# Patient Record
Sex: MALE | Race: WHITE | NOT HISPANIC OR LATINO | Employment: OTHER | URBAN - METROPOLITAN AREA
[De-identification: names, ages, dates, MRNs, and addresses within clinical notes are randomized per-mention and may not be internally consistent; named-entity substitution may affect disease eponyms.]

---

## 2023-03-30 DIAGNOSIS — Z13.220 LIPID SCREENING: ICD-10-CM

## 2023-03-30 DIAGNOSIS — Z12.5 SCREENING FOR PROSTATE CANCER: ICD-10-CM

## 2023-03-30 DIAGNOSIS — G25.0 ESSENTIAL TREMOR: ICD-10-CM

## 2023-03-30 DIAGNOSIS — N40.1 BENIGN PROSTATIC HYPERPLASIA WITH LOWER URINARY TRACT SYMPTOMS, SYMPTOM DETAILS UNSPECIFIED: ICD-10-CM

## 2023-03-30 DIAGNOSIS — E78.5 HYPERLIPIDEMIA, UNSPECIFIED HYPERLIPIDEMIA TYPE: ICD-10-CM

## 2023-03-30 DIAGNOSIS — I10 HYPERTENSION, UNSPECIFIED TYPE: ICD-10-CM

## 2023-03-30 NOTE — TELEPHONE ENCOUNTER
Pt said he was given rx for Primidonne 25mg for his tremors and was told to call back if that does not help. Pt says the med is not helping or making any difference with tremors. Pt would like med increased to 50mg. Local pharm: [facility] [place].

## 2023-03-31 RX ORDER — PRIMIDONE 50 MG/1
50 TABLET ORAL NIGHTLY
Qty: 90 TABLET | Refills: 1 | Status: SHIPPED | OUTPATIENT
Start: 2023-03-31 | End: 2023-08-25

## 2023-03-31 RX ORDER — PRIMIDONE 50 MG/1
50 TABLET ORAL NIGHTLY
Qty: 90 TABLET | Refills: 1 | Status: SHIPPED | OUTPATIENT
Start: 2023-03-31 | End: 2023-03-31 | Stop reason: SDUPTHER

## 2023-03-31 RX ORDER — PRIMIDONE 50 MG/1
50 TABLET ORAL NIGHTLY
COMMUNITY
Start: 2023-02-03 | End: 2023-03-31 | Stop reason: SDUPTHER

## 2023-04-27 ENCOUNTER — LAB (OUTPATIENT)
Dept: LAB | Facility: LAB | Age: 83
End: 2023-04-27
Payer: MEDICARE

## 2023-04-27 DIAGNOSIS — Z12.5 SCREENING FOR PROSTATE CANCER: ICD-10-CM

## 2023-04-27 DIAGNOSIS — I10 HYPERTENSION, UNSPECIFIED TYPE: ICD-10-CM

## 2023-04-27 DIAGNOSIS — E78.5 HYPERLIPIDEMIA, UNSPECIFIED HYPERLIPIDEMIA TYPE: ICD-10-CM

## 2023-04-27 LAB
ALANINE AMINOTRANSFERASE (SGPT) (U/L) IN SER/PLAS: 14 U/L (ref 10–52)
ALBUMIN (G/DL) IN SER/PLAS: 3.9 G/DL (ref 3.4–5)
ALKALINE PHOSPHATASE (U/L) IN SER/PLAS: 42 U/L (ref 33–136)
ANION GAP IN SER/PLAS: 11 MMOL/L (ref 10–20)
ASPARTATE AMINOTRANSFERASE (SGOT) (U/L) IN SER/PLAS: 18 U/L (ref 9–39)
BILIRUBIN TOTAL (MG/DL) IN SER/PLAS: 1 MG/DL (ref 0–1.2)
CALCIUM (MG/DL) IN SER/PLAS: 9.1 MG/DL (ref 8.6–10.3)
CARBON DIOXIDE, TOTAL (MMOL/L) IN SER/PLAS: 31 MMOL/L (ref 21–32)
CHLORIDE (MMOL/L) IN SER/PLAS: 103 MMOL/L (ref 98–107)
CHOLESTEROL (MG/DL) IN SER/PLAS: 117 MG/DL (ref 0–199)
CHOLESTEROL IN HDL (MG/DL) IN SER/PLAS: 54.8 MG/DL
CHOLESTEROL/HDL RATIO: 2.1
CREATININE (MG/DL) IN SER/PLAS: 1.04 MG/DL (ref 0.5–1.3)
ERYTHROCYTE DISTRIBUTION WIDTH (RATIO) BY AUTOMATED COUNT: 12.9 % (ref 11.5–14.5)
ERYTHROCYTE MEAN CORPUSCULAR HEMOGLOBIN CONCENTRATION (G/DL) BY AUTOMATED: 31.4 G/DL (ref 32–36)
ERYTHROCYTE MEAN CORPUSCULAR VOLUME (FL) BY AUTOMATED COUNT: 97 FL (ref 80–100)
ERYTHROCYTES (10*6/UL) IN BLOOD BY AUTOMATED COUNT: 4.23 X10E12/L (ref 4.5–5.9)
GFR MALE: 72 ML/MIN/1.73M2
GLUCOSE (MG/DL) IN SER/PLAS: 96 MG/DL (ref 74–99)
HEMATOCRIT (%) IN BLOOD BY AUTOMATED COUNT: 41.1 % (ref 41–52)
HEMOGLOBIN (G/DL) IN BLOOD: 12.9 G/DL (ref 13.5–17.5)
LDL: 54 MG/DL (ref 0–99)
LEUKOCYTES (10*3/UL) IN BLOOD BY AUTOMATED COUNT: 5.5 X10E9/L (ref 4.4–11.3)
PLATELETS (10*3/UL) IN BLOOD AUTOMATED COUNT: 140 X10E9/L (ref 150–450)
POTASSIUM (MMOL/L) IN SER/PLAS: 4.4 MMOL/L (ref 3.5–5.3)
PROSTATE SPECIFIC ANTIGEN,SCREEN: 0.29 NG/ML (ref 0–4)
PROTEIN TOTAL: 6.3 G/DL (ref 6.4–8.2)
SODIUM (MMOL/L) IN SER/PLAS: 141 MMOL/L (ref 136–145)
TRIGLYCERIDE (MG/DL) IN SER/PLAS: 43 MG/DL (ref 0–149)
UREA NITROGEN (MG/DL) IN SER/PLAS: 17 MG/DL (ref 6–23)
VLDL: 9 MG/DL (ref 0–40)

## 2023-04-27 PROCEDURE — 85027 COMPLETE CBC AUTOMATED: CPT

## 2023-04-27 PROCEDURE — 80053 COMPREHEN METABOLIC PANEL: CPT

## 2023-04-27 PROCEDURE — 84153 ASSAY OF PSA TOTAL: CPT

## 2023-04-27 PROCEDURE — 80061 LIPID PANEL: CPT

## 2023-04-27 PROCEDURE — 36415 COLL VENOUS BLD VENIPUNCTURE: CPT

## 2023-04-28 ENCOUNTER — TELEPHONE (OUTPATIENT)
Dept: PRIMARY CARE | Facility: CLINIC | Age: 83
End: 2023-04-28
Payer: MEDICARE

## 2023-04-28 NOTE — TELEPHONE ENCOUNTER
----- Message from Mj Moreira MD sent at 4/28/2023  7:45 AM EDT -----  Inform patient of normal results of all recent labs.   Even though some of the lab values may fall outside of the normal range, I do not feel they are clinically concerning or relevant at this time.

## 2023-05-04 ENCOUNTER — OFFICE VISIT (OUTPATIENT)
Dept: PRIMARY CARE | Facility: CLINIC | Age: 83
End: 2023-05-04
Payer: MEDICARE

## 2023-05-04 VITALS
HEART RATE: 52 BPM | HEIGHT: 74 IN | BODY MASS INDEX: 33.11 KG/M2 | SYSTOLIC BLOOD PRESSURE: 130 MMHG | TEMPERATURE: 97.3 F | RESPIRATION RATE: 20 BRPM | WEIGHT: 258 LBS | DIASTOLIC BLOOD PRESSURE: 80 MMHG

## 2023-05-04 DIAGNOSIS — Z00.00 ROUTINE GENERAL MEDICAL EXAMINATION AT HEALTH CARE FACILITY: Primary | ICD-10-CM

## 2023-05-04 DIAGNOSIS — H91.91 HEARING LOSS OF RIGHT EAR, UNSPECIFIED HEARING LOSS TYPE: ICD-10-CM

## 2023-05-04 DIAGNOSIS — G25.2 INTENTION TREMOR: ICD-10-CM

## 2023-05-04 PROBLEM — K21.9 GASTROESOPHAGEAL REFLUX DISEASE WITHOUT ESOPHAGITIS: Status: ACTIVE | Noted: 2023-05-04

## 2023-05-04 PROBLEM — R09.81 NASAL CONGESTION: Status: RESOLVED | Noted: 2023-05-04 | Resolved: 2023-05-04

## 2023-05-04 PROBLEM — J44.9 CHRONIC OBSTRUCTIVE PULMONARY DISEASE (MULTI): Status: ACTIVE | Noted: 2023-05-04

## 2023-05-04 PROBLEM — H10.30 ACUTE CONJUNCTIVITIS: Status: RESOLVED | Noted: 2023-05-04 | Resolved: 2023-05-04

## 2023-05-04 PROBLEM — R06.09 EXERTIONAL DYSPNEA: Status: ACTIVE | Noted: 2023-05-04

## 2023-05-04 PROBLEM — R31.0 GROSS HEMATURIA: Status: ACTIVE | Noted: 2023-05-04

## 2023-05-04 PROBLEM — H02.889 MGD (MEIBOMIAN GLAND DYSFUNCTION): Status: ACTIVE | Noted: 2023-05-04

## 2023-05-04 PROBLEM — N64.4 MASTODYNIA: Status: ACTIVE | Noted: 2023-05-04

## 2023-05-04 PROBLEM — R35.0 FREQUENCY OF URINATION: Status: ACTIVE | Noted: 2023-05-04

## 2023-05-04 PROBLEM — N20.0 RECURRENT NEPHROLITHIASIS: Status: ACTIVE | Noted: 2023-05-04

## 2023-05-04 PROBLEM — R06.09 EXERTIONAL DYSPNEA: Status: RESOLVED | Noted: 2023-05-04 | Resolved: 2023-05-04

## 2023-05-04 PROBLEM — H25.13 NUCLEAR SCLEROSIS OF BOTH EYES: Status: ACTIVE | Noted: 2023-05-04

## 2023-05-04 PROBLEM — R93.1 ABNORMAL ECHOCARDIOGRAM: Status: ACTIVE | Noted: 2023-05-04

## 2023-05-04 PROBLEM — N40.0 BPH (BENIGN PROSTATIC HYPERPLASIA): Status: ACTIVE | Noted: 2023-05-04

## 2023-05-04 PROBLEM — N63.0 LUMP OR MASS IN BREAST: Status: ACTIVE | Noted: 2023-05-04

## 2023-05-04 PROBLEM — R05.3 CHRONIC COUGH: Status: RESOLVED | Noted: 2023-05-04 | Resolved: 2023-05-04

## 2023-05-04 PROBLEM — R31.9 HEMATURIA: Status: RESOLVED | Noted: 2023-05-04 | Resolved: 2023-05-04

## 2023-05-04 PROBLEM — J01.90 ACUTE SINUSITIS: Status: RESOLVED | Noted: 2023-05-04 | Resolved: 2023-05-04

## 2023-05-04 PROBLEM — I27.20 PULMONARY HYPERTENSION (MULTI): Status: ACTIVE | Noted: 2023-05-04

## 2023-05-04 PROBLEM — I25.2 HISTORY OF ST ELEVATION MYOCARDIAL INFARCTION (STEMI): Status: ACTIVE | Noted: 2023-05-04

## 2023-05-04 PROBLEM — E66.9 OBESITY: Status: ACTIVE | Noted: 2023-05-04

## 2023-05-04 PROBLEM — K91.86 RETAINED GALLSTONES FOLLOWING LAPAROSCOPIC CHOLECYSTECTOMY: Status: ACTIVE | Noted: 2023-05-04

## 2023-05-04 PROBLEM — R06.2 WHEEZE: Status: RESOLVED | Noted: 2023-05-04 | Resolved: 2023-05-04

## 2023-05-04 PROBLEM — N30.01 ACUTE CYSTITIS WITH HEMATURIA: Status: ACTIVE | Noted: 2023-05-04

## 2023-05-04 PROBLEM — F41.9 ANXIETY: Status: ACTIVE | Noted: 2023-05-04

## 2023-05-04 PROBLEM — R91.8 LUNG NODULES: Status: ACTIVE | Noted: 2023-05-04

## 2023-05-04 PROBLEM — G47.33 OBSTRUCTIVE SLEEP APNEA: Status: ACTIVE | Noted: 2023-05-04

## 2023-05-04 PROBLEM — I25.10 MILD CAD: Status: ACTIVE | Noted: 2023-05-04

## 2023-05-04 PROBLEM — H43.813 PVD (POSTERIOR VITREOUS DETACHMENT), BOTH EYES: Status: ACTIVE | Noted: 2023-05-04

## 2023-05-04 PROBLEM — R25.1 TREMOR: Status: ACTIVE | Noted: 2023-05-04

## 2023-05-04 PROBLEM — H66.001 ACUTE SUPPURATIVE OTITIS MEDIA OF RIGHT EAR WITHOUT SPONTANEOUS RUPTURE OF TYMPANIC MEMBRANE: Status: ACTIVE | Noted: 2023-05-04

## 2023-05-04 PROBLEM — K80.50 CHOLEDOCHOLITHIASIS: Status: ACTIVE | Noted: 2023-05-04

## 2023-05-04 PROBLEM — H02.053 TRICHIASIS OF RIGHT EYELID: Status: ACTIVE | Noted: 2023-05-04

## 2023-05-04 PROBLEM — R06.2 WHEEZE: Status: ACTIVE | Noted: 2023-05-04

## 2023-05-04 PROBLEM — N30.01 ACUTE CYSTITIS WITH HEMATURIA: Status: RESOLVED | Noted: 2023-05-04 | Resolved: 2023-05-04

## 2023-05-04 PROBLEM — I49.3 FREQUENT PVCS: Status: ACTIVE | Noted: 2023-05-04

## 2023-05-04 PROBLEM — R07.89 CHEST DISCOMFORT: Status: RESOLVED | Noted: 2023-05-04 | Resolved: 2023-05-04

## 2023-05-04 PROBLEM — I49.1 PAC (PREMATURE ATRIAL CONTRACTION): Status: ACTIVE | Noted: 2023-05-04

## 2023-05-04 PROBLEM — J01.90 ACUTE SINUSITIS: Status: ACTIVE | Noted: 2023-05-04

## 2023-05-04 PROBLEM — R35.0 FREQUENCY OF URINATION: Status: RESOLVED | Noted: 2023-05-04 | Resolved: 2023-05-04

## 2023-05-04 PROBLEM — E78.5 HYPERLIPIDEMIA: Status: ACTIVE | Noted: 2023-05-04

## 2023-05-04 PROBLEM — L82.0 SEBORRHEIC KERATOSIS, INFLAMED: Status: ACTIVE | Noted: 2023-05-04

## 2023-05-04 PROBLEM — J45.909 ASTHMA (HHS-HCC): Status: ACTIVE | Noted: 2023-05-04

## 2023-05-04 PROBLEM — R07.89 CHEST DISCOMFORT: Status: ACTIVE | Noted: 2023-05-04

## 2023-05-04 PROBLEM — R09.81 NASAL CONGESTION: Status: ACTIVE | Noted: 2023-05-04

## 2023-05-04 PROBLEM — I48.0 PAROXYSMAL ATRIAL FIBRILLATION (MULTI): Status: ACTIVE | Noted: 2023-05-04

## 2023-05-04 PROBLEM — J30.9 ALLERGIC RHINITIS: Status: ACTIVE | Noted: 2023-05-04

## 2023-05-04 PROBLEM — R31.0 GROSS HEMATURIA: Status: RESOLVED | Noted: 2023-05-04 | Resolved: 2023-05-04

## 2023-05-04 PROBLEM — I49.3 VENTRICULAR ECTOPY: Status: ACTIVE | Noted: 2023-05-04

## 2023-05-04 PROBLEM — R25.1 TREMOR: Status: RESOLVED | Noted: 2023-05-04 | Resolved: 2023-05-04

## 2023-05-04 PROBLEM — G25.0 ESSENTIAL TREMOR: Status: ACTIVE | Noted: 2023-05-04

## 2023-05-04 PROBLEM — C44.91 BASAL CELL CARCINOMA: Status: ACTIVE | Noted: 2023-05-04

## 2023-05-04 PROBLEM — I49.9 ARRHYTHMIA: Status: ACTIVE | Noted: 2023-05-04

## 2023-05-04 PROBLEM — H66.001 ACUTE SUPPURATIVE OTITIS MEDIA OF RIGHT EAR WITHOUT SPONTANEOUS RUPTURE OF TYMPANIC MEMBRANE: Status: RESOLVED | Noted: 2023-05-04 | Resolved: 2023-05-04

## 2023-05-04 PROBLEM — R05.3 CHRONIC COUGH: Status: ACTIVE | Noted: 2023-05-04

## 2023-05-04 PROBLEM — L57.0 ACTINIC KERATOSIS: Status: ACTIVE | Noted: 2023-05-04

## 2023-05-04 PROBLEM — R00.1 SINUS BRADYCARDIA: Status: ACTIVE | Noted: 2023-05-04

## 2023-05-04 PROBLEM — R23.8 VENOUS LAKE OF LIP: Status: ACTIVE | Noted: 2023-05-04

## 2023-05-04 PROBLEM — H10.30 ACUTE CONJUNCTIVITIS: Status: ACTIVE | Noted: 2023-05-04

## 2023-05-04 PROBLEM — N34.3 DYSURIA-FREQUENCY SYNDROME: Status: ACTIVE | Noted: 2023-05-04

## 2023-05-04 PROBLEM — I10 BENIGN ESSENTIAL HYPERTENSION: Status: ACTIVE | Noted: 2023-05-04

## 2023-05-04 PROBLEM — R31.9 HEMATURIA: Status: ACTIVE | Noted: 2023-05-04

## 2023-05-04 PROCEDURE — 1170F FXNL STATUS ASSESSED: CPT | Performed by: FAMILY MEDICINE

## 2023-05-04 PROCEDURE — 3075F SYST BP GE 130 - 139MM HG: CPT | Performed by: FAMILY MEDICINE

## 2023-05-04 PROCEDURE — 3079F DIAST BP 80-89 MM HG: CPT | Performed by: FAMILY MEDICINE

## 2023-05-04 PROCEDURE — G0439 PPPS, SUBSEQ VISIT: HCPCS | Performed by: FAMILY MEDICINE

## 2023-05-04 PROCEDURE — 99212 OFFICE O/P EST SF 10 MIN: CPT | Performed by: FAMILY MEDICINE

## 2023-05-04 PROCEDURE — 1036F TOBACCO NON-USER: CPT | Performed by: FAMILY MEDICINE

## 2023-05-04 PROCEDURE — 1160F RVW MEDS BY RX/DR IN RCRD: CPT | Performed by: FAMILY MEDICINE

## 2023-05-04 PROCEDURE — 1159F MED LIST DOCD IN RCRD: CPT | Performed by: FAMILY MEDICINE

## 2023-05-04 RX ORDER — ASPIRIN 81 MG/1
TABLET ORAL
COMMUNITY

## 2023-05-04 RX ORDER — VALSARTAN 160 MG/1
80 TABLET ORAL DAILY
COMMUNITY
End: 2023-05-08 | Stop reason: SDUPTHER

## 2023-05-04 RX ORDER — TERAZOSIN 5 MG/1
5 CAPSULE ORAL EVERY MORNING
COMMUNITY
End: 2023-05-08 | Stop reason: SDUPTHER

## 2023-05-04 RX ORDER — ZINC GLUCONATE 50 MG
TABLET ORAL
COMMUNITY

## 2023-05-04 RX ORDER — URSODIOL 300 MG/1
1 CAPSULE ORAL 2 TIMES DAILY
COMMUNITY
Start: 2015-04-06 | End: 2023-06-19

## 2023-05-04 RX ORDER — NAPROXEN SODIUM 220 MG/1
TABLET ORAL
COMMUNITY

## 2023-05-04 RX ORDER — GUAIFENESIN 1200 MG/1
1200 TABLET, EXTENDED RELEASE ORAL EVERY 12 HOURS PRN
COMMUNITY

## 2023-05-04 RX ORDER — SOTALOL HYDROCHLORIDE 80 MG/1
1 TABLET ORAL EVERY 12 HOURS
COMMUNITY
Start: 2019-08-17 | End: 2023-06-19

## 2023-05-04 RX ORDER — PANTOPRAZOLE SODIUM 40 MG/1
40 TABLET, DELAYED RELEASE ORAL
COMMUNITY
End: 2023-05-25

## 2023-05-04 RX ORDER — ATORVASTATIN CALCIUM 80 MG/1
80 TABLET, FILM COATED ORAL DAILY
COMMUNITY
End: 2023-05-08

## 2023-05-04 RX ORDER — CALCIUM CARBONATE 500(1250)
1 TABLET ORAL DAILY
COMMUNITY

## 2023-05-04 RX ORDER — DUTASTERIDE 0.5 MG/1
0.5 CAPSULE, LIQUID FILLED ORAL DAILY
COMMUNITY

## 2023-05-04 RX ORDER — ACETAMINOPHEN 500 MG
TABLET ORAL DAILY
COMMUNITY

## 2023-05-04 RX ORDER — LORATADINE 10 MG/1
10 TABLET ORAL DAILY
COMMUNITY

## 2023-05-04 RX ORDER — OMEPRAZOLE 40 MG/1
40 CAPSULE, DELAYED RELEASE ORAL DAILY
COMMUNITY
End: 2023-08-25

## 2023-05-04 RX ORDER — FLUTICASONE PROPIONATE 50 MCG
1 SPRAY, SUSPENSION (ML) NASAL AS NEEDED
COMMUNITY
End: 2023-12-07 | Stop reason: ALTCHOICE

## 2023-05-04 RX ORDER — PROPRANOLOL HYDROCHLORIDE 20 MG/1
1 TABLET ORAL 2 TIMES DAILY
COMMUNITY
Start: 2019-10-15 | End: 2023-05-08

## 2023-05-04 RX ORDER — TERAZOSIN 10 MG/1
10 CAPSULE ORAL NIGHTLY
COMMUNITY
Start: 2014-01-02 | End: 2023-05-08

## 2023-05-04 ASSESSMENT — ACTIVITIES OF DAILY LIVING (ADL)
GROCERY_SHOPPING: INDEPENDENT
DOING_HOUSEWORK: INDEPENDENT
DRESSING: INDEPENDENT
BATHING: INDEPENDENT
TAKING_MEDICATION: INDEPENDENT
MANAGING_FINANCES: INDEPENDENT

## 2023-05-04 ASSESSMENT — PATIENT HEALTH QUESTIONNAIRE - PHQ9
1. LITTLE INTEREST OR PLEASURE IN DOING THINGS: NOT AT ALL
SUM OF ALL RESPONSES TO PHQ9 QUESTIONS 1 AND 2: 0
2. FEELING DOWN, DEPRESSED OR HOPELESS: NOT AT ALL

## 2023-05-04 NOTE — PROGRESS NOTES
Juan Carlos Nguyen is a 83 y.o. male here today for MAW.  Patient is here for a periodic health exam. We reviewed previous labs and medical records and history. I reviewed preventative health testing and immunizations.    Colonoscopy 3/30/2023 [facility] - 3 year repeat.  He had recent labs and we reviewed them in detail.  They were all essentially normal and his PSA was very good.    HPI   Right ear hearing has worsened since OM 8 months ago.  He says that his hearing is muffled in his ear but he is not having any pain or pressure sensations.  He does take fluticasone nasal spray every day for seasonal allergies.    Essential tremor-tremor has improved significantly since adding Primidone at 50 mg at his last visit.  He is happy with the improvement and wants to continue the same.        Objective    Visit Vitals  /80   Pulse 52   Temp 36.3 °C (97.3 °F)   Resp 20     Body mass index is 33.13 kg/m².     Physical Exam  Vitals and nursing note reviewed.   Constitutional:       General: He is not in acute distress.     Appearance: Normal appearance.   HENT:      Head: Normocephalic and atraumatic.      Right Ear: Tympanic membrane, ear canal and external ear normal.      Left Ear: Tympanic membrane, ear canal and external ear normal.      Nose: Nose normal.      Mouth/Throat:      Mouth: Mucous membranes are moist.      Pharynx: Oropharynx is clear.   Eyes:      Extraocular Movements: Extraocular movements intact.      Conjunctiva/sclera: Conjunctivae normal.      Pupils: Pupils are equal, round, and reactive to light.   Cardiovascular:      Rate and Rhythm: Normal rate and regular rhythm.      Pulses: Normal pulses.      Heart sounds: Normal heart sounds. No murmur heard.     No friction rub. No gallop.   Pulmonary:      Effort: Pulmonary effort is normal. No respiratory distress.      Breath sounds: Normal breath sounds.   Abdominal:      General: Abdomen is flat. Bowel sounds are normal. There is no distension.       Palpations: Abdomen is soft.      Tenderness: There is no abdominal tenderness.   Musculoskeletal:         General: Normal range of motion.      Cervical back: Normal range of motion and neck supple.   Lymphadenopathy:      Cervical: No cervical adenopathy.   Skin:     General: Skin is warm and dry.      Findings: No lesion or rash.   Neurological:      General: No focal deficit present.      Mental Status: He is alert. Mental status is at baseline.   Psychiatric:         Mood and Affect: Mood normal.         Behavior: Behavior normal.         Thought Content: Thought content normal.         Judgment: Judgment normal.        Right ear-tympanic membrane is somewhat dull with evidence of fluid behind it but no significant erythema.  Canal is normal.    Assessment    1. Routine general medical examination at health care facility  1 Year Follow Up In [facility] - PCP - Wellness Exam   I recommend regular exercise, balanced diet, regular dental exams, and healthy habits.  Patient denies depression sxs.  Patient is able to perform all ADL's without assistance.  I reminded patient to get yearly eye exams with glaucoma screening.  He had a colonoscopy earlier this year as above.  His recent PSA was normal.  His immunizations are up-to-date.  His risk assessment was negative.  I recommend to eat plenty of plant foods (such as whole-grain products, fruits, and vegetables) and a moderate amount of lean and low-fat, animal-based food (meat and dairy products).  When shopping, choose lean meats, fish, and poultry. I recommend to increase aerobic exercise.  I recommend a yearly flu shot in the fall and I recommend a yearly wellness exam.     2. Hearing loss of right ear, unspecified hearing loss type  Referral to ENT   Patient has decreased hearing in his right ear since otitis media about 8 months ago.  He is already using Flonase so I am going to refer him to Dr. Chan for evaluation and treatment.      3. Intention  tremor     This has improved a lot since we added primidone at his last visit.  No change in current treatment regimen.

## 2023-05-06 DIAGNOSIS — I27.20 PULMONARY HYPERTENSION (MULTI): ICD-10-CM

## 2023-05-06 DIAGNOSIS — E78.5 HYPERLIPIDEMIA, UNSPECIFIED HYPERLIPIDEMIA TYPE: ICD-10-CM

## 2023-05-06 DIAGNOSIS — I49.9 CARDIAC ARRHYTHMIA, UNSPECIFIED CARDIAC ARRHYTHMIA TYPE: ICD-10-CM

## 2023-05-08 RX ORDER — ATORVASTATIN CALCIUM 80 MG/1
TABLET, FILM COATED ORAL
Qty: 90 TABLET | Refills: 1 | Status: SHIPPED | OUTPATIENT
Start: 2023-05-08 | End: 2023-11-03

## 2023-05-08 RX ORDER — TERAZOSIN 10 MG/1
CAPSULE ORAL
Qty: 90 CAPSULE | Refills: 1 | Status: SHIPPED | OUTPATIENT
Start: 2023-05-08 | End: 2023-06-02 | Stop reason: SDUPTHER

## 2023-05-08 RX ORDER — PROPRANOLOL HYDROCHLORIDE 20 MG/1
TABLET ORAL
Qty: 180 TABLET | Refills: 1 | Status: SHIPPED | OUTPATIENT
Start: 2023-05-08 | End: 2023-08-25 | Stop reason: SDUPTHER

## 2023-05-08 RX ORDER — VALSARTAN 80 MG/1
TABLET ORAL
Qty: 90 TABLET | Refills: 1 | Status: SHIPPED | OUTPATIENT
Start: 2023-05-08 | End: 2023-08-25 | Stop reason: SDUPTHER

## 2023-05-25 DIAGNOSIS — K21.9 GASTROESOPHAGEAL REFLUX DISEASE WITHOUT ESOPHAGITIS: ICD-10-CM

## 2023-05-25 DIAGNOSIS — I49.9 CARDIAC ARRHYTHMIA, UNSPECIFIED CARDIAC ARRHYTHMIA TYPE: ICD-10-CM

## 2023-05-25 RX ORDER — PANTOPRAZOLE SODIUM 40 MG/1
TABLET, DELAYED RELEASE ORAL
Qty: 90 TABLET | Refills: 0 | Status: SHIPPED | OUTPATIENT
Start: 2023-05-25 | End: 2023-08-25 | Stop reason: SDUPTHER

## 2023-05-25 RX ORDER — APIXABAN 5 MG/1
TABLET, FILM COATED ORAL
Qty: 180 TABLET | Refills: 0 | Status: SHIPPED | OUTPATIENT
Start: 2023-05-25 | End: 2023-09-08

## 2023-06-02 DIAGNOSIS — I27.20 PULMONARY HYPERTENSION (MULTI): ICD-10-CM

## 2023-06-02 RX ORDER — TERAZOSIN 5 MG/1
5 CAPSULE ORAL NIGHTLY
Qty: 90 CAPSULE | Refills: 0 | Status: SHIPPED | OUTPATIENT
Start: 2023-06-02 | End: 2023-08-25 | Stop reason: SDUPTHER

## 2023-06-02 NOTE — TELEPHONE ENCOUNTER
Pt calling for refill on   Terazosin 5mg daily #90    [facility] into [facility] in [place] on [address]           Patient states he's on 10mg and 5mg daily, so he just needs the 5mg ones called in.

## 2023-06-17 DIAGNOSIS — I48.0 PAROXYSMAL ATRIAL FIBRILLATION (MULTI): ICD-10-CM

## 2023-06-17 DIAGNOSIS — N34.3 DYSURIA-FREQUENCY SYNDROME: ICD-10-CM

## 2023-06-19 RX ORDER — URSODIOL 300 MG/1
CAPSULE ORAL
Qty: 180 CAPSULE | Refills: 1 | Status: SHIPPED | OUTPATIENT
Start: 2023-06-19 | End: 2023-08-25 | Stop reason: SDUPTHER

## 2023-06-19 RX ORDER — SOTALOL HYDROCHLORIDE 80 MG/1
TABLET ORAL
Qty: 180 TABLET | Refills: 1 | Status: SHIPPED | OUTPATIENT
Start: 2023-06-19 | End: 2023-08-25 | Stop reason: SDUPTHER

## 2023-08-16 ENCOUNTER — PATIENT OUTREACH (OUTPATIENT)
Dept: CARE COORDINATION | Facility: CLINIC | Age: 83
End: 2023-08-16
Payer: MEDICARE

## 2023-08-16 RX ORDER — ASPIRIN 81 MG/1
81 TABLET ORAL DAILY
COMMUNITY
End: 2023-08-25

## 2023-08-16 NOTE — PROGRESS NOTES
Discharge Facility: [facility]  Discharge Diagnosis: Stroke like symptoms  Admission Date: 8/14/2023  Discharge Date: 8/15/2023    PCP Appointment Date:  -8/25/2023 1140    Specialist Appointment Date:   -Dr. Antwan Sorto neurology; pt to call to schedule    Hospital Encounter and Summary: Linked     See discharge assessment below for further details    Engagement  Call Start Time: 1037 (8/16/2023 10:38 AM)    Medications  Medications reviewed with patient/caregiver?: Yes (new prescription reviewed; aspirin) (8/16/2023 10:38 AM)  Is the patient having any side effects they believe may be caused by any medication additions or changes?: No (8/16/2023 10:38 AM)  Does the patient have all medications ordered at discharge?: Yes (8/16/2023 10:38 AM)  Care Management Interventions: No intervention needed (8/16/2023 10:38 AM)  Is the patient taking all medications as directed (includes completed medication regime)?: Yes (8/16/2023 10:38 AM)  Medication Comments: Pt states he was told to stop taking primidone and continue aspirin and eliquis (8/16/2023 10:38 AM)    Appointments  Does the patient have a primary care provider?: Yes (8/16/2023 10:38 AM)  Care Management Interventions: Verified appointment date/time/provider (8/16/2023 10:38 AM)  Has the patient kept scheduled appointments due by today?: Yes (8/16/2023 10:38 AM)  Care Management Interventions: Advised to schedule with specialist (8/16/2023 10:38 AM)    Self Management  Has home health visited the patient within 72 hours of discharge?: Not applicable (8/16/2023 10:38 AM)    Patient Teaching  Does the patient have access to their discharge instructions?: Yes (8/16/2023 10:38 AM)  Care Management Interventions: Reviewed instructions with patient (8/16/2023 10:38 AM)  What is the patient's perception of their health status since discharge?: Improving (8/16/2023 10:38 AM)  Is the patient/caregiver able to teach back the hierarchy of who to call/visit for  symptoms/problems? PCP, Specialist, Home Health nurse, Urgent Care, ED, 911: Yes (8/16/2023 10:38 AM)    Wrap Up  Wrap Up Additional Comments: Pt admitted to [facility] 8/14-8/15 for left sided facial numbness and left arm numbness/weakness. Pt reports improvement in symptoms. Pt started on aspirin in addition to eliquis and reports he was instructed to discontinue primidone. Pt scheduled for PCP follow up 8/25 and states he will call to schedule neurology follow up. Pt denies any questions, needs, or concerns at this time. Pt encouraged to call if questions arise. (8/16/2023 10:38 AM)  Call End Time: 1047 (8/16/2023 10:38 AM)

## 2023-08-23 DIAGNOSIS — J45.909 ASTHMA, UNSPECIFIED ASTHMA SEVERITY, UNSPECIFIED WHETHER COMPLICATED, UNSPECIFIED WHETHER PERSISTENT (HHS-HCC): Primary | ICD-10-CM

## 2023-08-23 DIAGNOSIS — J44.9 CHRONIC OBSTRUCTIVE PULMONARY DISEASE, UNSPECIFIED COPD TYPE (MULTI): ICD-10-CM

## 2023-08-25 ENCOUNTER — OFFICE VISIT (OUTPATIENT)
Dept: PRIMARY CARE | Facility: CLINIC | Age: 83
End: 2023-08-25
Payer: MEDICARE

## 2023-08-25 VITALS
HEART RATE: 54 BPM | WEIGHT: 250 LBS | TEMPERATURE: 97.3 F | BODY MASS INDEX: 32.08 KG/M2 | DIASTOLIC BLOOD PRESSURE: 60 MMHG | HEIGHT: 74 IN | RESPIRATION RATE: 16 BRPM | SYSTOLIC BLOOD PRESSURE: 122 MMHG

## 2023-08-25 DIAGNOSIS — I49.9 CARDIAC ARRHYTHMIA, UNSPECIFIED CARDIAC ARRHYTHMIA TYPE: ICD-10-CM

## 2023-08-25 DIAGNOSIS — K21.9 GASTROESOPHAGEAL REFLUX DISEASE WITHOUT ESOPHAGITIS: ICD-10-CM

## 2023-08-25 DIAGNOSIS — I48.0 PAROXYSMAL ATRIAL FIBRILLATION (MULTI): ICD-10-CM

## 2023-08-25 DIAGNOSIS — N34.3 DYSURIA-FREQUENCY SYNDROME: ICD-10-CM

## 2023-08-25 DIAGNOSIS — I63.9 CEREBROVASCULAR ACCIDENT (CVA), UNSPECIFIED MECHANISM (MULTI): Primary | ICD-10-CM

## 2023-08-25 DIAGNOSIS — I27.20 PULMONARY HYPERTENSION (MULTI): ICD-10-CM

## 2023-08-25 PROBLEM — J44.9 COPD (CHRONIC OBSTRUCTIVE PULMONARY DISEASE) (MULTI): Status: ACTIVE | Noted: 2023-08-25

## 2023-08-25 PROBLEM — R10.9 ABDOMINAL PAIN: Status: ACTIVE | Noted: 2023-08-25

## 2023-08-25 PROBLEM — H90.3 BILATERAL SENSORINEURAL HEARING LOSS: Status: ACTIVE | Noted: 2023-08-25

## 2023-08-25 PROBLEM — J33.9 NASAL POLYPS: Status: ACTIVE | Noted: 2023-08-25

## 2023-08-25 PROBLEM — H52.4 BILATERAL PRESBYOPIA: Status: ACTIVE | Noted: 2023-08-25

## 2023-08-25 PROBLEM — R29.90 STROKE-LIKE SYMPTOMS: Status: ACTIVE | Noted: 2023-08-25

## 2023-08-25 PROCEDURE — 1160F RVW MEDS BY RX/DR IN RCRD: CPT | Performed by: FAMILY MEDICINE

## 2023-08-25 PROCEDURE — 1125F AMNT PAIN NOTED PAIN PRSNT: CPT | Performed by: FAMILY MEDICINE

## 2023-08-25 PROCEDURE — 3078F DIAST BP <80 MM HG: CPT | Performed by: FAMILY MEDICINE

## 2023-08-25 PROCEDURE — 1036F TOBACCO NON-USER: CPT | Performed by: FAMILY MEDICINE

## 2023-08-25 PROCEDURE — 99495 TRANSJ CARE MGMT MOD F2F 14D: CPT | Performed by: FAMILY MEDICINE

## 2023-08-25 PROCEDURE — 1159F MED LIST DOCD IN RCRD: CPT | Performed by: FAMILY MEDICINE

## 2023-08-25 PROCEDURE — 3074F SYST BP LT 130 MM HG: CPT | Performed by: FAMILY MEDICINE

## 2023-08-25 RX ORDER — PANTOPRAZOLE SODIUM 40 MG/1
TABLET, DELAYED RELEASE ORAL
Qty: 90 TABLET | Refills: 0 | OUTPATIENT
Start: 2023-08-25

## 2023-08-25 RX ORDER — SOTALOL HYDROCHLORIDE 80 MG/1
80 TABLET ORAL EVERY 12 HOURS
Qty: 180 TABLET | Refills: 1 | Status: SHIPPED | OUTPATIENT
Start: 2023-08-25 | End: 2024-02-19

## 2023-08-25 RX ORDER — PROPRANOLOL HYDROCHLORIDE 20 MG/1
20 TABLET ORAL 2 TIMES DAILY
Qty: 180 TABLET | Refills: 1 | Status: SHIPPED | OUTPATIENT
Start: 2023-08-25 | End: 2024-05-03

## 2023-08-25 RX ORDER — TERAZOSIN 10 MG/1
10 CAPSULE ORAL NIGHTLY
COMMUNITY
End: 2023-08-25 | Stop reason: SDUPTHER

## 2023-08-25 RX ORDER — PANTOPRAZOLE SODIUM 40 MG/1
40 TABLET, DELAYED RELEASE ORAL DAILY
Qty: 90 TABLET | Refills: 1 | Status: SHIPPED | OUTPATIENT
Start: 2023-08-25 | End: 2024-02-19

## 2023-08-25 RX ORDER — TERAZOSIN 5 MG/1
5 CAPSULE ORAL NIGHTLY
Qty: 90 CAPSULE | Refills: 1 | Status: SHIPPED | OUTPATIENT
Start: 2023-08-25 | End: 2024-03-08 | Stop reason: SDUPTHER

## 2023-08-25 RX ORDER — URSODIOL 300 MG/1
300 CAPSULE ORAL 2 TIMES DAILY
Qty: 180 CAPSULE | Refills: 1 | Status: SHIPPED | OUTPATIENT
Start: 2023-08-25 | End: 2024-02-19

## 2023-08-25 RX ORDER — TERAZOSIN 10 MG/1
10 CAPSULE ORAL NIGHTLY
Qty: 90 CAPSULE | Refills: 1 | Status: SHIPPED | OUTPATIENT
Start: 2023-08-25 | End: 2024-03-08

## 2023-08-25 RX ORDER — VALSARTAN 80 MG/1
80 TABLET ORAL DAILY
Qty: 90 TABLET | Refills: 1 | Status: SHIPPED | OUTPATIENT
Start: 2023-08-25 | End: 2024-05-03

## 2023-08-25 NOTE — PROGRESS NOTES
Juan Carlos Nguyen is a 83 y.o. male here today for   Chief Complaint   Patient presents with    Hospital Follow-up     Stroke         HPI     Recent right sided small stroke in [facility] from 8/14 to 8/15/23.  No change in meds because on DOAC already.  Was already on high dose atorvastatin.  On Eliquis.  On Baby ASA and Plavix.  They recommend to stop the Primidone b/c of conflict with Eliquis.  Has to make appt with Noreen in neuro for fu.  He has appt with EP in 2 weeks to recheck and discuss plan.  Now left arm and facial sxs completely resolve.  Face only lasted a few seconds.  Arm lasted a few hours.  He says he has called the neurologist office but they told him they cannot see him until January.  He completely denies any new neurological symptoms.  There is no numbness or tingling or weakness in his body.  I reviewed the results from the hospital stay including his echo and MRI.    Tremor is a little worse since he stopped the Primidone but not severe.        Current Outpatient Medications:     aspirin 81 mg EC tablet, , Disp: , Rfl:     atorvastatin (Lipitor) 80 mg tablet, TAKE ONE TABLET BY MOUTH EVERY DAY, Disp: 90 tablet, Rfl: 1    calcium 500 mg calcium (1,250 mg) tablet, Take 1 tablet (1,250 mg) by mouth once daily., Disp: , Rfl:     cholecalciferol (Vitamin D3) 5,000 Units tablet, Take by mouth once daily., Disp: , Rfl:     dutasteride (Avodart) 0.5 mg capsule, Take 1 capsule (0.5 mg) by mouth once daily., Disp: , Rfl:     Eliquis 5 mg tablet, TAKE ONE TABLET BY MOUTH TWO TIMES A DAY, Disp: 180 tablet, Rfl: 0    fluticasone (Flonase) 50 mcg/actuation nasal spray, Administer 1 spray into each nostril if needed for rhinitis. Shake gently. Before first use, prime pump. After use, clean tip and replace cap., Disp: , Rfl:     guaiFENesin (Mucinex) 1,200 mg tablet extended release 12hr, Take 1 tablet (1,200 mg) by mouth every 12 hours if needed., Disp: , Rfl:     loratadine (Claritin) 10 mg tablet, Take 1 tablet  (10 mg) by mouth once daily., Disp: , Rfl:     omega 3-dha-epa-fish oil (Fish OiL) 1,200 (144-216) mg capsule, Take by mouth., Disp: , Rfl:     zinc gluconate 50 mg tablet, Take by mouth., Disp: , Rfl:     pantoprazole (ProtoNix) 40 mg EC tablet, Take 1 tablet (40 mg) by mouth once daily. Do not crush, chew, or split., Disp: 90 tablet, Rfl: 1    propranolol (Inderal) 20 mg tablet, Take 1 tablet (20 mg) by mouth 2 times a day., Disp: 180 tablet, Rfl: 1    sotalol (Betapace) 80 mg tablet, Take 1 tablet (80 mg) by mouth every 12 hours., Disp: 180 tablet, Rfl: 1    terazosin (Hytrin) 10 mg capsule, Take 1 capsule (10 mg) by mouth once daily at bedtime., Disp: 90 capsule, Rfl: 1    terazosin (Hytrin) 5 mg capsule, Take 1 capsule (5 mg) by mouth once daily at bedtime., Disp: 90 capsule, Rfl: 1    ursodiol (Actigall) 300 mg capsule, Take 1 capsule (300 mg) by mouth 2 times a day., Disp: 180 capsule, Rfl: 1    valsartan (Diovan) 80 mg tablet, Take 1 tablet (80 mg) by mouth once daily. for blood pressure, Disp: 90 tablet, Rfl: 1    Patient Active Problem List   Diagnosis    Abnormal echocardiogram    Actinic keratosis    Allergic rhinitis    Anxiety    Arrhythmia    Ventricular ectopy    Asthma    Basal cell carcinoma    Benign essential hypertension    BPH (benign prostatic hyperplasia)    Choledocholithiasis    Chronic obstructive pulmonary disease (CMS/HCC)    Dysuria-frequency syndrome    Essential tremor    Mastodynia    Lung nodules    Lump or mass in breast    Hyperlipidemia    History of ST elevation myocardial infarction (STEMI)    Gastroesophageal reflux disease without esophagitis    Frequent PVCs    MGD (meibomian gland dysfunction)    Mild CAD    Nuclear sclerosis of both eyes    Obesity    Obstructive sleep apnea    Osteoarthritis of knee    PAC (premature atrial contraction)    Paroxysmal atrial fibrillation (CMS/HCC)    Pulmonary hypertension (CMS/HCC)    PVD (posterior vitreous detachment), both eyes     Recurrent nephrolithiasis    Retained gallstones following laparoscopic cholecystectomy    Seborrheic keratosis, inflamed    Sinus bradycardia    Intention tremor    Trichiasis of right eyelid    Venous lake of lip    Abdominal pain    COPD (chronic obstructive pulmonary disease) (CMS/HCC)    Acute ischemic stroke (CMS/HCC)    Bilateral presbyopia    Bilateral sensorineural hearing loss    Nasal polyps    Stroke-like symptoms         Recent Results (from the past 672 hour(s))   Electrocardiogram 12 Lead    Collection Time: 08/06/23  8:59 AM   Result Value Ref Range    Ventricular Rate 57 BPM    Atrial Rate 57 BPM    FL Interval 190 ms    QRS Duration 102 ms    QT Interval 436 ms    QTC Calculation(Bazett) 424 ms    P Axis 72 degrees    R Axis 44 degrees    T Axis 72 degrees    QRS Count 9 beats    Q Onset 221 ms    P Onset 126 ms    P Offset 180 ms    T Offset 439 ms    QTC Fredericia 428 ms   Electrocardiogram 12 Lead    Collection Time: 08/06/23  8:59 AM   Result Value Ref Range    Ventricular Rate 57 BPM    Atrial Rate 57 BPM    FL Interval 190 ms    QRS Duration 102 ms    QT Interval 436 ms    QTC Calculation(Bazett) 424 ms    P Axis 72 degrees    R Axis 44 degrees    T Axis 72 degrees    QRS Count 9 beats    Q Onset 221 ms    P Onset 126 ms    P Offset 180 ms    T Offset 439 ms    QTC Fredericia 428 ms   Comprehensive Metabolic Panel    Collection Time: 08/06/23  9:41 AM   Result Value Ref Range    Glucose 133 (H) 74 - 99 mg/dL    Sodium 136 136 - 145 mmol/L    Potassium 3.9 3.5 - 5.3 mmol/L    Chloride 105 98 - 107 mmol/L    Bicarbonate 23 21 - 32 mmol/L    Anion Gap 12 10 - 20 mmol/L    Urea Nitrogen 19 6 - 23 mg/dL    Creatinine 0.98 0.50 - 1.30 mg/dL    GFR MALE 77 >90 mL/min/1.73m2    Calcium 8.4 (L) 8.6 - 10.3 mg/dL    Albumin 3.5 3.4 - 5.0 g/dL    Alkaline Phosphatase 45 33 - 136 U/L    Total Protein 5.2 (L) 6.4 - 8.2 g/dL    AST 15 9 - 39 U/L    Total Bilirubin 1.3 (H) 0.0 - 1.2 mg/dL    ALT (SGPT) 12  10 - 52 U/L   Troponin I, High Sensitivity    Collection Time: 08/06/23  9:41 AM   Result Value Ref Range    Troponin I 13 0 - 20 ng/L   CBC and Auto Differential    Collection Time: 08/06/23  9:41 AM   Result Value Ref Range    WBC 12.3 (H) 4.4 - 11.3 x10E9/L    nRBC 0.0 0.0 - 0.0 /100 WBC    RBC 4.08 (L) 4.50 - 5.90 x10E12/L    Hemoglobin 12.3 (L) 13.5 - 17.5 g/dL    Hematocrit 39.8 (L) 41.0 - 52.0 %    MCV 98 80 - 100 fL    MCHC 30.9 (L) 32.0 - 36.0 g/dL    Platelets 117 (L) 150 - 450 x10E9/L    RDW 12.9 11.5 - 14.5 %    Neutrophils % 82.0 40.0 - 80.0 %    Immature Granulocytes %, Automated 0.7 0.0 - 0.9 %    Lymphocytes % 5.6 13.0 - 44.0 %    Monocytes % 11.4 2.0 - 10.0 %    Eosinophils % 0.1 0.0 - 6.0 %    Basophils % 0.2 0.0 - 2.0 %    Neutrophils Absolute 10.09 (H) 1.60 - 5.50 x10E9/L    Lymphocytes Absolute 0.69 (L) 0.80 - 3.00 x10E9/L    Monocytes Absolute 1.40 (H) 0.05 - 0.80 x10E9/L    Eosinophils Absolute 0.01 0.00 - 0.40 x10E9/L    Basophils Absolute 0.02 0.00 - 0.10 x10E9/L   Lipase    Collection Time: 08/06/23  9:41 AM   Result Value Ref Range    Lipase 16 9 - 82 U/L   Urinalysis with Reflex Microscopic and Culture    Collection Time: 08/06/23 10:17 AM   Result Value Ref Range    Color, Urine YELLOW STRAW,YELLOW    Appearance, Urine CLEAR CLEAR    Specific Gravity, Urine 1.021 1.005 - 1.035    pH, Urine 5.0 5.0 - 8.0    Protein, Urine NEGATIVE NEGATIVE mg/dL    Glucose, Urine NEGATIVE NEGATIVE mg/dL    Blood, Urine MODERATE(2+) (A) NEGATIVE    Ketones, Urine NEGATIVE NEGATIVE mg/dL    Bilirubin, Urine NEGATIVE NEGATIVE    Urobilinogen, Urine <2.0 0.0 - 1.9 mg/dL    Nitrite, Urine NEGATIVE NEGATIVE    Leukocyte Esterase, Urine NEGATIVE NEGATIVE   Urinalysis Microscopic Only    Collection Time: 08/06/23 10:17 AM   Result Value Ref Range    WBC, Urine 5-20 (A) 0 - 5 /HPF    RBC, Urine  (A) 0 - 5 /HPF   Urine Culture    Collection Time: 08/06/23 10:17 AM    Specimen: Urine   Result Value Ref Range     Urine Culture NO SIGNIFICANT GROWTH.    Comprehensive Metabolic Panel    Collection Time: 08/14/23 12:52 PM   Result Value Ref Range    Glucose 96 74 - 99 mg/dL    Sodium 140 136 - 145 mmol/L    Potassium 3.9 3.5 - 5.3 mmol/L    Chloride 105 98 - 107 mmol/L    Bicarbonate 27 21 - 32 mmol/L    Anion Gap 12 10 - 20 mmol/L    Urea Nitrogen 19 6 - 23 mg/dL    Creatinine 1.15 0.50 - 1.30 mg/dL    GFR MALE 64 >90 mL/min/1.73m2    Calcium 9.3 8.6 - 10.3 mg/dL    Albumin 3.9 3.4 - 5.0 g/dL    Alkaline Phosphatase 51 33 - 136 U/L    Total Protein 6.5 6.4 - 8.2 g/dL    AST 15 9 - 39 U/L    Total Bilirubin 0.6 0.0 - 1.2 mg/dL    ALT (SGPT) 14 10 - 52 U/L   CBC and Auto Differential    Collection Time: 08/14/23 12:52 PM   Result Value Ref Range    WBC 7.6 4.4 - 11.3 x10E9/L    nRBC 0.0 0.0 - 0.0 /100 WBC    RBC 4.04 (L) 4.50 - 5.90 x10E12/L    Hemoglobin 12.2 (L) 13.5 - 17.5 g/dL    Hematocrit 37.7 (L) 41.0 - 52.0 %    MCV 93 80 - 100 fL    MCHC 32.4 32.0 - 36.0 g/dL    Platelets 187 150 - 450 x10E9/L    RDW 12.7 11.5 - 14.5 %    Neutrophils % 75.4 40.0 - 80.0 %    Immature Granulocytes %, Automated 0.7 0.0 - 0.9 %    Lymphocytes % 14.7 13.0 - 44.0 %    Monocytes % 6.3 2.0 - 10.0 %    Eosinophils % 2.5 0.0 - 6.0 %    Basophils % 0.4 0.0 - 2.0 %    Neutrophils Absolute 5.75 (H) 1.60 - 5.50 x10E9/L    Lymphocytes Absolute 1.12 0.80 - 3.00 x10E9/L    Monocytes Absolute 0.48 0.05 - 0.80 x10E9/L    Eosinophils Absolute 0.19 0.00 - 0.40 x10E9/L    Basophils Absolute 0.03 0.00 - 0.10 x10E9/L   Troponin I, High Sensitivity    Collection Time: 08/14/23 12:52 PM   Result Value Ref Range    Troponin I 9 0 - 20 ng/L   Protime-INR    Collection Time: 08/14/23 12:52 PM   Result Value Ref Range    Protime 15.2 (H) 9.8 - 12.8 sec    INR 1.3 (H) 0.9 - 1.1   Magnesium    Collection Time: 08/14/23 12:52 PM   Result Value Ref Range    Magnesium 1.82 1.60 - 2.40 mg/dL   Lipid Panel    Collection Time: 08/14/23 12:52 PM   Result Value Ref Range     Cholesterol 101 0 - 199 mg/dL    HDL 37.5 (A) mg/dL    Cholesterol/HDL Ratio 2.7     LDL 53 0 - 99 mg/dL    VLDL 11 0 - 40 mg/dL    Triglycerides 55 0 - 149 mg/dL   Hemoglobin A1C    Collection Time: 08/14/23 12:52 PM   Result Value Ref Range    Hemoglobin A1C 5.8 (A) %    Estimated Average Glucose 120 MG/DL   Electrocardiogram 12 Lead    Collection Time: 08/14/23  1:08 PM   Result Value Ref Range    Ventricular Rate 56 BPM    Atrial Rate 56 BPM    IN Interval 202 ms    QRS Duration 108 ms    QT Interval 482 ms    QTC Calculation(Bazett) 465 ms    P Axis 87 degrees    R Axis 74 degrees    T Axis 80 degrees    QRS Count 10 beats    Q Onset 201 ms    P Onset 100 ms    P Offset 161 ms    T Offset 442 ms    QTC Fredericia 471 ms   Magnesium    Collection Time: 08/15/23  7:36 AM   Result Value Ref Range    Magnesium 1.87 1.60 - 2.40 mg/dL   Renal Function Panel    Collection Time: 08/15/23  7:36 AM   Result Value Ref Range    Glucose 88 74 - 99 mg/dL    Sodium 140 136 - 145 mmol/L    Potassium 4.2 3.5 - 5.3 mmol/L    Chloride 106 98 - 107 mmol/L    Bicarbonate 27 21 - 32 mmol/L    Anion Gap 11 10 - 20 mmol/L    Urea Nitrogen 17 6 - 23 mg/dL    Creatinine 0.89 0.50 - 1.30 mg/dL    GFR MALE 86 >90 mL/min/1.73m2    Calcium 8.8 8.6 - 10.3 mg/dL    Phosphorus 3.2 2.5 - 4.9 mg/dL    Albumin 3.6 3.4 - 5.0 g/dL   CBC and Auto Differential    Collection Time: 08/15/23  7:36 AM   Result Value Ref Range    WBC 5.5 4.4 - 11.3 x10E9/L    nRBC 0.0 0.0 - 0.0 /100 WBC    RBC 4.12 (L) 4.50 - 5.90 x10E12/L    Hemoglobin 12.4 (L) 13.5 - 17.5 g/dL    Hematocrit 39.0 (L) 41.0 - 52.0 %    MCV 95 80 - 100 fL    MCHC 31.8 (L) 32.0 - 36.0 g/dL    Platelets 164 150 - 450 x10E9/L    RDW 12.6 11.5 - 14.5 %    Neutrophils % 63.9 40.0 - 80.0 %    Immature Granulocytes %, Automated 0.7 0.0 - 0.9 %    Lymphocytes % 20.8 13.0 - 44.0 %    Monocytes % 9.9 2.0 - 10.0 %    Eosinophils % 4.2 0.0 - 6.0 %    Basophils % 0.5 0.0 - 2.0 %    Neutrophils  "Absolute 3.50 1.60 - 5.50 x10E9/L    Lymphocytes Absolute 1.14 0.80 - 3.00 x10E9/L    Monocytes Absolute 0.54 0.05 - 0.80 x10E9/L    Eosinophils Absolute 0.23 0.00 - 0.40 x10E9/L    Basophils Absolute 0.03 0.00 - 0.10 x10E9/L   Urinalysis with Reflex Microscopic    Collection Time: 08/16/23  4:14 AM   Result Value Ref Range    Color, Urine CANCELED     Appearance, Urine CANCELED     Specific Gravity, Urine CANCELED     pH, Urine CANCELED     Protein, Urine CANCELED     Glucose, Urine CANCELED     Blood, Urine CANCELED     Ketones, Urine CANCELED     Bilirubin, Urine CANCELED     Urobilinogen, Urine CANCELED     Nitrite, Urine CANCELED     Leukocyte Esterase, Urine CANCELED     Ascorbic Acid CANCELED mg/dL        Objective    Visit Vitals  /60   Pulse 54   Temp 36.3 °C (97.3 °F)   Resp 16   Ht 1.88 m (6' 2\")   Wt 113 kg (250 lb)   BMI 32.10 kg/m²     Body mass index is 32.1 kg/m².     Physical Exam   General - Not in acute distress and cooperative.  Build & Nutrition - Well developed  Posture - Normal  Gait - Normal  Mental Status - alert and oriented x 3    Head - Normocephalic    Neck - Thyroid normal size    Eyes - Bilateral - Sclera clear and lids pink without edema or mass.      Skin - Warm and dry with no rashes on visible skin    Lungs - Clear to auscultation and normal breathing effort    Cardiovascular - RRR and no murmurs, rubs or thrill.    Peripheral Vascular - Bilateral - no edema present    Neuropsychiatric - normal mood and affect        Assessment    1. Cerebrovascular accident (CVA), unspecified mechanism (CMS/HCC)     Patient has fully recovered from recent mild right-sided stroke.  He had very transient left face and left arm weakness but these resolved quickly as above.  He has no residual symptoms.  He is already on Plavix and baby aspirin and Eliquis.  He is on maximum dose statin.  We will call neurology to try to get him an appointment more quickly.  I recommend that he follow-up " with his electrophysiology team to discuss whether it was a cardiac origin for this recent stroke and if he needs a procedure on his POORNIMA.     2. Pulmonary hypertension (CMS/HCC)  valsartan (Diovan) 80 mg tablet, terazosin (Hytrin) 10 mg capsule, terazosin (Hytrin) 5 mg capsule   This has been stable.  No change in current treatment regimen.  Refill given of current medication.   3. Dysuria-frequency syndrome  ursodiol (Actigall) 300 mg capsule   No change in current treatment regimen.   4. Paroxysmal atrial fibrillation (CMS/HCC)  sotalol (Betapace) 80 mg tablet   Refill given of current medication.  This has been stable and he will follow-up with EP in the near future.   5. Gastroesophageal reflux disease without esophagitis  pantoprazole (ProtoNix) 40 mg EC tablet   No change in current treatment regimen.  Refill given of current medication.   6. Cardiac arrhythmia, unspecified cardiac arrhythmia type  propranolol (Inderal) 20 mg tablet   No change in current treatment regimen.  Refill given of current medication.

## 2023-08-28 ENCOUNTER — PATIENT OUTREACH (OUTPATIENT)
Dept: CARE COORDINATION | Facility: CLINIC | Age: 83
End: 2023-08-28
Payer: MEDICARE

## 2023-08-28 NOTE — PROGRESS NOTES
Call regarding appt. with PCP on 8/25/2023 after hospitalization.  At time of outreach call the patient feels as if their condition has improved since last visit.  Reviewed the PCP appointment with the pt and addressed any questions or concerns.  Pt states he has cardiologist follow up appt today and neurology follow up 9/7.  Pt denies any questions, needs, or concerns at this time. Pt encouraged to call if questions arise.

## 2023-09-05 LAB — URINE CULTURE: NORMAL

## 2023-09-08 DIAGNOSIS — I49.9 CARDIAC ARRHYTHMIA, UNSPECIFIED CARDIAC ARRHYTHMIA TYPE: ICD-10-CM

## 2023-09-08 LAB
ANION GAP IN SER/PLAS: 12 MMOL/L (ref 10–20)
CALCIUM (MG/DL) IN SER/PLAS: 9 MG/DL (ref 8.6–10.3)
CARBON DIOXIDE, TOTAL (MMOL/L) IN SER/PLAS: 30 MMOL/L (ref 21–32)
CHLORIDE (MMOL/L) IN SER/PLAS: 105 MMOL/L (ref 98–107)
CREATININE (MG/DL) IN SER/PLAS: 1.02 MG/DL (ref 0.5–1.3)
ERYTHROCYTE DISTRIBUTION WIDTH (RATIO) BY AUTOMATED COUNT: 13.2 % (ref 11.5–14.5)
ERYTHROCYTE MEAN CORPUSCULAR HEMOGLOBIN CONCENTRATION (G/DL) BY AUTOMATED: 32 G/DL (ref 32–36)
ERYTHROCYTE MEAN CORPUSCULAR VOLUME (FL) BY AUTOMATED COUNT: 95 FL (ref 80–100)
ERYTHROCYTES (10*6/UL) IN BLOOD BY AUTOMATED COUNT: 4.12 X10E12/L (ref 4.5–5.9)
GFR MALE: 74 ML/MIN/1.73M2
GLUCOSE (MG/DL) IN SER/PLAS: 106 MG/DL (ref 74–99)
HEMATOCRIT (%) IN BLOOD BY AUTOMATED COUNT: 39.1 % (ref 41–52)
HEMOGLOBIN (G/DL) IN BLOOD: 12.5 G/DL (ref 13.5–17.5)
INR IN PPP BY COAGULATION ASSAY: 1.3 (ref 0.9–1.1)
LEUKOCYTES (10*3/UL) IN BLOOD BY AUTOMATED COUNT: 5.3 X10E9/L (ref 4.4–11.3)
PLATELETS (10*3/UL) IN BLOOD AUTOMATED COUNT: 117 X10E9/L (ref 150–450)
POTASSIUM (MMOL/L) IN SER/PLAS: 4.2 MMOL/L (ref 3.5–5.3)
PROTHROMBIN TIME (PT) IN PPP BY COAGULATION ASSAY: 14.7 SEC (ref 9.8–12.8)
SODIUM (MMOL/L) IN SER/PLAS: 143 MMOL/L (ref 136–145)
UREA NITROGEN (MG/DL) IN SER/PLAS: 17 MG/DL (ref 6–23)

## 2023-09-08 RX ORDER — APIXABAN 5 MG/1
TABLET, FILM COATED ORAL
Qty: 180 TABLET | Refills: 3 | Status: SHIPPED | OUTPATIENT
Start: 2023-09-08

## 2023-09-13 ENCOUNTER — HOSPITAL ENCOUNTER (OUTPATIENT)
Dept: DATA CONVERSION | Facility: HOSPITAL | Age: 83
End: 2023-09-13
Attending: INTERNAL MEDICINE | Admitting: INTERNAL MEDICINE
Payer: MEDICARE

## 2023-09-13 DIAGNOSIS — Z95.818 PRESENCE OF OTHER CARDIAC IMPLANTS AND GRAFTS: ICD-10-CM

## 2023-09-13 DIAGNOSIS — I48.91 UNSPECIFIED ATRIAL FIBRILLATION (MULTI): ICD-10-CM

## 2023-09-13 PROBLEM — M19.91 LOCALIZED, PRIMARY OSTEOARTHRITIS: Status: ACTIVE | Noted: 2023-08-10

## 2023-09-13 PROBLEM — G45.9 TIA (TRANSIENT ISCHEMIC ATTACK): Status: ACTIVE | Noted: 2023-09-13

## 2023-09-13 PROBLEM — Z79.01 ANTICOAGULANT LONG-TERM USE: Status: ACTIVE | Noted: 2023-09-13

## 2023-09-13 PROBLEM — I63.9 ACUTE ISCHEMIC STROKE (MULTI): Status: RESOLVED | Noted: 2023-08-25 | Resolved: 2023-09-13

## 2023-09-13 PROBLEM — E66.811 CLASS 1 OBESITY: Status: ACTIVE | Noted: 2023-05-04

## 2023-09-13 PROBLEM — R10.9 ABDOMINAL PAIN: Status: RESOLVED | Noted: 2023-08-25 | Resolved: 2023-09-13

## 2023-09-13 RX ORDER — CEPHALEXIN 500 MG/1
CAPSULE ORAL
COMMUNITY
Start: 2023-09-03 | End: 2023-10-25 | Stop reason: ALTCHOICE

## 2023-09-13 RX ORDER — ENOXAPARIN SODIUM 100 MG/ML
INJECTION SUBCUTANEOUS
COMMUNITY
Start: 2023-08-28 | End: 2023-10-25 | Stop reason: ALTCHOICE

## 2023-09-13 RX ORDER — ALBUTEROL SULFATE 90 UG/1
1 AEROSOL, METERED RESPIRATORY (INHALATION) EVERY 4 HOURS PRN
COMMUNITY

## 2023-09-13 RX ORDER — ALBUTEROL SULFATE 0.83 MG/ML
2.5 SOLUTION RESPIRATORY (INHALATION) EVERY 6 HOURS PRN
COMMUNITY

## 2023-09-15 LAB
ATRIAL RATE: 49 BPM
P AXIS: 73 DEGREES
P OFFSET: 180 MS
P ONSET: 116 MS
PR INTERVAL: 208 MS
Q ONSET: 220 MS
QRS COUNT: 8 BEATS
QRS DURATION: 108 MS
QT INTERVAL: 480 MS
QTC CALCULATION(BAZETT): 433 MS
QTC FREDERICIA: 448 MS
R AXIS: 16 DEGREES
T AXIS: 17 DEGREES
T OFFSET: 460 MS
VENTRICULAR RATE: 49 BPM

## 2023-09-28 ENCOUNTER — PATIENT OUTREACH (OUTPATIENT)
Dept: CARE COORDINATION | Facility: CLINIC | Age: 83
End: 2023-09-28
Payer: MEDICARE

## 2023-09-28 NOTE — PROGRESS NOTES
Call placed regarding one month post discharge follow up call.  At time of outreach call the patient feels as if their condition has improved since initial visit with PCP or specialist.  Pt reports he is waiting to have a planned surgery for nasal polyps that are bothering him. Pt denies any other issues at this time.  Questions or concerns regarding recovery period addressed at this time.  Reviewed any PCP or specialists progress notes/labs/radiology reports if applicable and addressed any questions or concerns.  Verified next PCP appt 11/7/2023 1820.    Pt denies any questions, needs, or concerns at this time. Pt encouraged to call if questions arise.

## 2023-09-29 VITALS — BODY MASS INDEX: 32 KG/M2 | WEIGHT: 249.34 LBS | HEIGHT: 74 IN

## 2023-09-30 NOTE — H&P
History & Physical Reviewed:   I have reviewed the History and Physical dated:  28-Aug-2023   History and Physical reviewed and relevant findings noted. Patient examined to review pertinent physical  findings.: Significant findings noted below   Findings: episode of fast heart beat a couple weeks  ago, lasting days   Home Medications Reviewed: changes noted  see MAR   Allergies Reviewed: no changes noted       Airway/Sedation Assessment:  ·  Emotional Status calm   ·  Neurologic alert & oriented x 3   ·  Respiratory clear to auscultation   ·  Cardiovascular rhythm & rate regular   ·  GI/ soft, nontender     · Pulses present: Pedal Left, Pedal Right, Radial Left, Radial Right     ·  Mouth Opening OK yes   ·  Neck Flexibility OK yes   ·  Loose Teeth no   ·  Oropharyngeal Classification Class II   ·  ASA PS Classification ASA III   ·  Sedation Plan light sedation     Consent:   COVID-19 Consent:  ·  COVID-19 Risk Consent Surgeon has reviewed key risks related to the risk of carlos COVID-19 and if they contract COVID-19 what the risks are.       Electronic Signatures:  Laura Hopkins)  (Signed 13-Sep-2023 20:27)   Authored: History & Physical Reviewed, Airway/Sedation,  Consent, Note Completion      Last Updated: 13-Sep-2023 20:27 by Laura Hopkins)

## 2023-10-09 ENCOUNTER — TELEPHONE (OUTPATIENT)
Dept: OTOLARYNGOLOGY | Facility: CLINIC | Age: 83
End: 2023-10-09
Payer: MEDICARE

## 2023-10-09 DIAGNOSIS — J32.9 CHRONIC SINUSITIS, UNSPECIFIED LOCATION: Primary | ICD-10-CM

## 2023-10-09 RX ORDER — PREDNISONE 20 MG/1
TABLET ORAL
Qty: 9 TABLET | Refills: 0 | Status: SHIPPED | OUTPATIENT
Start: 2023-10-09 | End: 2023-11-07 | Stop reason: ALTCHOICE

## 2023-10-09 NOTE — TELEPHONE ENCOUNTER
Patient is being scheduled for FESS surgery at [facility] on 10/31/23.   Can you please send his 7 day taper of prednisone to [facility] in [place].

## 2023-10-18 ENCOUNTER — OFFICE VISIT (OUTPATIENT)
Dept: PULMONOLOGY | Facility: CLINIC | Age: 83
End: 2023-10-18
Payer: MEDICARE

## 2023-10-18 VITALS
HEIGHT: 74 IN | HEART RATE: 58 BPM | TEMPERATURE: 97.6 F | OXYGEN SATURATION: 96 % | DIASTOLIC BLOOD PRESSURE: 66 MMHG | RESPIRATION RATE: 16 BRPM | BODY MASS INDEX: 32.29 KG/M2 | WEIGHT: 251.6 LBS | SYSTOLIC BLOOD PRESSURE: 103 MMHG

## 2023-10-18 DIAGNOSIS — J45.20 MILD INTERMITTENT ASTHMA WITHOUT COMPLICATION (HHS-HCC): ICD-10-CM

## 2023-10-18 DIAGNOSIS — J44.9 CHRONIC OBSTRUCTIVE PULMONARY DISEASE, UNSPECIFIED COPD TYPE (MULTI): Primary | ICD-10-CM

## 2023-10-18 PROCEDURE — 1160F RVW MEDS BY RX/DR IN RCRD: CPT

## 2023-10-18 PROCEDURE — 3074F SYST BP LT 130 MM HG: CPT

## 2023-10-18 PROCEDURE — 99213 OFFICE O/P EST LOW 20 MIN: CPT

## 2023-10-18 PROCEDURE — 1125F AMNT PAIN NOTED PAIN PRSNT: CPT

## 2023-10-18 PROCEDURE — 1036F TOBACCO NON-USER: CPT

## 2023-10-18 PROCEDURE — 3078F DIAST BP <80 MM HG: CPT

## 2023-10-18 PROCEDURE — 1159F MED LIST DOCD IN RCRD: CPT

## 2023-10-18 RX ORDER — BUDESONIDE AND FORMOTEROL FUMARATE DIHYDRATE 80; 4.5 UG/1; UG/1
2 AEROSOL RESPIRATORY (INHALATION)
COMMUNITY
End: 2023-11-01

## 2023-10-18 NOTE — PROGRESS NOTES
Patient: Juan Carlos Nguyen    [id number]  : 1940 -- AGE 83 y.o.    Provider: DANIELE [facility]     Location [facility]   Service Date: 10/18/2023              [facility]  Follow Up Visit Note      HISTORY OF PRESENT ILLNESS       HISTORY OF PRESENT ILLNESS   Mr. Nguyen is a 83 y.o male, former cigar smoker, being evaluated for follow up visit for Asthma/COPD overlap.  .     I have independently interviewed and examined the patient in the office and reviewed available records.    Current History    On today's visit, the patient reports he uses symbicort daily, rarely uses his albuterol. He reports he swims 3 days a week, rides his bike with no issues nut notices when he walks for a distance he feels SOB. He has not tried his albuterol inhaler to see if he improves. He wears his CPAP nightly. He states he has developed nasal polyps that have gotten worse, he has to breath out of his mouth, he is having surgery to remove the polpys 10/31/23. He is up to date with vaccines.     Previous pulmonary history: COPD/ Asthma    Inhalers/nebulized medications: symbicort, albuterol    Hospitalization History: He has not been hospitalized over the last year for breathing related problem.    Sleep history: CPAP compliant      ALLERGIES AND MEDICATIONS     ALLERGIES  Allergies   Allergen Reactions    Montelukast Other    Finasteride Itching    Iodinated Contrast Media Other    Sulfamethoxazole-Trimethoprim Rash and Other     FACIAL REDNESS AND SWEATING       MEDICATIONS  Current Outpatient Medications   Medication Sig Dispense Refill    albuterol 2.5 mg /3 mL (0.083 %) nebulizer solution Take 3 mL (2.5 mg) by nebulization every 6 hours if needed for wheezing.      albuterol 90 mcg/actuation inhaler Inhale 1 puff every 4 hours if needed for wheezing.      apixaban (Eliquis) 5 mg tablet TAKE ONE TABLET BY MOUTH TWO TIMES A  tablet 3    aspirin 81 mg EC tablet        atorvastatin (Lipitor) 80 mg tablet TAKE ONE TABLET BY MOUTH EVERY DAY 90 tablet 1    calcium 500 mg calcium (1,250 mg) tablet Take 1 tablet (1,250 mg) by mouth once daily.      cephalexin (Keflex) 500 mg capsule TAKE ONE CAPSULE BY MOUTH TWO TIMES A DAY FOR 7 DAYS      cholecalciferol (Vitamin D3) 5,000 Units tablet Take by mouth once daily.      dutasteride (Avodart) 0.5 mg capsule Take 1 capsule (0.5 mg) by mouth once daily.      enoxaparin (Lovenox) 100 mg/mL syringe hold Eliquis 3 days before procedure. give injection twice daily two days before procedure. One in am day before procedure , none day of procedure. twice daily injection day after procedure. Resume Eliquis.      fluticasone (Flonase) 50 mcg/actuation nasal spray Administer 1 spray into each nostril if needed for rhinitis. Shake gently. Before first use, prime pump. After use, clean tip and replace cap.      guaiFENesin (Mucinex) 1,200 mg tablet extended release 12hr Take 1 tablet (1,200 mg) by mouth every 12 hours if needed.      loratadine (Claritin) 10 mg tablet Take 1 tablet (10 mg) by mouth once daily.      omega 3-dha-epa-fish oil (Fish OiL) 1,200 (144-216) mg capsule Take by mouth.      pantoprazole (ProtoNix) 40 mg EC tablet Take 1 tablet (40 mg) by mouth once daily. Do not crush, chew, or split. 90 tablet 1    predniSONE (Deltasone) 20 mg tablet 2 tabs daily x3 days then 1 tablet daily for 2 days then 1/2 tablet for 2 days 9 tablet 0    propranolol (Inderal) 20 mg tablet Take 1 tablet (20 mg) by mouth 2 times a day. 180 tablet 1    sotalol (Betapace) 80 mg tablet Take 1 tablet (80 mg) by mouth every 12 hours. 180 tablet 1    terazosin (Hytrin) 10 mg capsule Take 1 capsule (10 mg) by mouth once daily at bedtime. 90 capsule 1    terazosin (Hytrin) 5 mg capsule Take 1 capsule (5 mg) by mouth once daily at bedtime. 90 capsule 1    ursodiol (Actigall) 300 mg capsule Take 1 capsule (300 mg) by mouth 2 times a day. 180 capsule 1    valsartan  (Diovan) 80 mg tablet Take 1 tablet (80 mg) by mouth once daily. for blood pressure 90 tablet 1    zinc gluconate 50 mg tablet Take by mouth.       No current facility-administered medications for this visit.         PAST HISTORY     PAST MEDICAL HISTORY  Asthma  COPD  GERD  CAD  DLD  Asthma  Allergic rhinits  Nasal polyps    PAST SURGICAL HISTORY  Past Surgical History:   Procedure Laterality Date    CHOLECYSTECTOMY  10/14/2014    Cholecystectomy    MR HEAD ANGIO WO IV CONTRAST  8/14/2023    MR HEAD ANGIO WO IV CONTRAST 8/14/2023 [facility]    MR NECK ANGIO WO IV CONTRAST  8/14/2023    MR NECK ANGIO WO IV CONTRAST 8/14/2023 [facility]    OTHER SURGICAL HISTORY  04/30/2014    Left Breast Biopsy During Breast Surgery    OTHER SURGICAL HISTORY  04/11/2022    Loop recorder insertion    OTHER SURGICAL HISTORY  10/11/2021    Loop recorder insertion    OTHER SURGICAL HISTORY  10/11/2021    Tonsillectomy    OTHER SURGICAL HISTORY  10/11/2021    Cardioversion    OTHER SURGICAL HISTORY  06/07/2017    Anesthesia For Cystoscopy    OTHER SURGICAL HISTORY  04/11/2022    Colonoscopy    OTHER SURGICAL HISTORY  04/11/2022    Endoscopy    TONSILLECTOMY  06/07/2017    Tonsillectomy       IMMUNIZATION HISTORY  Immunization History   Administered Date(s) Administered    Flu vaccine (IIV4), preservative free *Check age/dose* 12/10/2013    Influenza, Unspecified 12/01/2011, 11/16/2015, 02/20/2016, 12/20/2016, 10/12/2017, 10/12/2018, 11/11/2019, 10/08/2020, 10/23/2020, 09/27/2021, 09/28/2022    Influenza, seasonal, injectable 11/01/2022    Influenza, seasonal, injectable, preservative free 11/17/2012, 10/18/2014    Pfizer Gray Cap SARS-CoV-2 05/13/2022    Pfizer Purple Cap SARS-CoV-2 01/26/2021, 02/16/2021, 09/27/2021, 05/01/2022    Pneumococcal conjugate vaccine, 13-valent (PREVNAR 13) 01/15/2016    Pneumococcal polysaccharide vaccine, 23-valent, age 2 years and older (PNEUMOVAX 23) 04/12/2021, 10/01/2022    Td vaccine, age 7 years and older  (TENIVAC) 06/15/2010    Tdap vaccine, age 7 year and older (BOOSTRIX) 04/21/2022    Zoster vaccine, recombinant, adult (SHINGRIX) 07/09/2022, 09/28/2022    Zoster, live 02/21/2013       SOCIAL HISTORY  smoking: former cigar smoker, quit years ago   illicit drug use: none   drinking: socially  Pets: no    OCCUPATIONAL/ENVIRONMENTAL HISTORY  Retired. 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 Exposed to asbestos one day working there.   No known exposure to asbestos, silica, beryllium or inhaled metals.  No exposure to birds or exotic animals.    FAMILY HISTORY  Family History   Problem Relation Name Age of Onset    Diabetes Mother      Cancer Mother      Breast cancer Mother      Diabetes Father      Cancer Father      Prostate cancer Father      Cancer Sister      Breast cancer Sister      Other (oral cancer) Brother      Cancer Daughter      Breast cancer Daughter      Other (breast cancer in female) Other       No family history of pulmonary disease.  No family history of cancer.  No family history of autoimmune disorders.    REVIEW OF SYSTEMS     REVIEW OF SYSTEMS  Review of Systems    Constitutional: No fever, no chills, no night sweats.    Eyes: No double vision, no floaters, no dry eyes.   ENT: See HPI.   Neck: No neck stiffness.  Cardiovascular: No sharp chest pain, no heart racing, no leg swelling.  Respiratory: as noted in HPI.   Gastrointestinal: No nausea, no vomiting, no diarrhea.   Musculoskeletal: No joint pain, no back pain.   Integumentary: No rashes or sores.  Neurological: No dizziness, no headaches. Sleeping well.  Psychiatric: No mood changes.   Endocrine: No hot flashes, no cold intolerance, weight is stable.  Hematologic: No easy bruising or bleeding.    PHYSICAL EXAM     VITAL SIGNS:   Vitals:    10/18/23 1015   BP: 103/66   Pulse: 58   Resp: 16   Temp: 36.4 °C (97.6 °F)   SpO2: 96%         CURRENT WEIGHT:Body mass index is 32.3 kg/m².    PREVIOUS WEIGHTS:  Wt Readings from  Last 3 Encounters:   08/25/23 113 kg (250 lb)   05/04/23 117 kg (258 lb)   04/18/23 115 kg (254 lb)       Physical Exam    Constitutional: General appearance: Alert and oriented.  No acute distress. Well developed, well nourished.  Head and face: Symmetric  ENT: external inspection of ear and nose normal. No intranasal polyps. No oropharyngeal exudates.    Oropharynx: normal   Neck: supple, no lymphadenopathy  Pulmonary: Chest is normal. No increased work of breathing or signs of respiratory distress. Clear to auscultation bilaterally - no crackles, wheezing, or rhonchi.   Cardiovascular: Heart rate and rhythm normal. Normal S1, S2 - no murmurs, gallops, or pericardial rub.   Abdomen: Soft, non tender, +BS  Extremities: No edema. No clubbing or cyanosis of the fingernails.    Neurologic: Moves all four extremities   MSK: Normal movements of extremities. Gait normal   Psychiatric: Intact judgement and insight.    RESULTS/DATA     Pulmonary Function Test Results     Pulmonary Functions Testing Results:  -1/2022: FEV1/FVC 0.55/ FEV1 1.51 (46% +BD response )/ FVC 2.76 (62%)/ TLC 5.58 (72%) %/ DLCO 80% -mod obstruction, significant BD response. Mild restrictive disease.      6MWT 3/2021: 299m () 95%-->93% RA ANASTASIIA 5    FENO  6/2022: 63  6/2021: 52  3/2021: 71        Chest Radiograph     XR chest 2 view 09/13/2023    Narrative  Interpreted By:  PRASHANTH GORDON MD  STUDY:  Chest Radiographs;  9/13/2023, 1:29PM.    INDICATION:  Pre op.    COMPARISON:  CXR 4/2/2021.    ACCESSION NUMBER(S):  [id number]    ORDERING CLINICIAN:  CYNTHIA VÁZQUEZ MD    TECHNIQUE:  Frontal and lateral chest.    FINDINGS:    CARDIOMEDIASTINAL SILHOUETTE:  Cardiomediastinal silhouette is normal in size and configuration.    LUNGS:  Calcified granuloma right lung apex. No focal consolidation. No  pleural effusion. No pneumothorax.    ABDOMEN:  No remarkable upper abdominal findings.    BONES:  No acute osseous changes.    Impression  No  acute cardiopulmonary abnormality.      Signed by Juan Durbin MD      Chest CT Scan     CT Chest 1/2021: Calcified nodule seen suggestive of old healed granulomatous disease. There is unchanged upper lobe fibrosis and architectural distortion as well as features suggesting chronic bronchitis or smoking related airway disease. No overt bullous disease detected. Enlarged main pulmonary artery suggestive of pulmonary arterial hypertension      Echocardiogram     Echocardiogram     [place]ative  [facility] [place]  [address], [place] [address]  Tel [phone] Fax [phone]    TRANSTHORACIC ECHOCARDIOGRAM REPORT      Patient Name:     DENISE JOHAN Sr Physician:  [id number] Sheela Varela MD  Study Date:       8/15/2023          Referring           SAKSHI PECK  Physician:  MRN/PID:          [id number]           PCP:  Accession/Order#: [id number]          Department          [facility] Echo Lab  Location:  YOB: 1940          Fellow:  Gender:           M                  Nurse:              Pedro Eubanks RN  Admit Date:       8/14/2023          Sonographer:        Ana Lutz [facility]  Admission Status: Inpatient -        Additional Staff:  Priority discharge  Height:           187.00 cm          CC Report to:  Weight:           116.00 kg          Study Type:         Echocardiogram  BSA:              2.40 m2  Blood Pressure: 122 /66 mmHg    Diagnosis/ICD: I63.9-Cerebral Infarction, unspecified  Indication:    Stroke  Procedure/CPT: Echo Complete w Full [id number]    Patient History:  Pertinent History: HTN, COPD, A-Fib and Palpitations. Previous echocardiogram  03-.    Study Detail: The following Echo studies were performed: 2D, M-Mode, Doppler and  color flow. Agitated saline used as a contrast agent for  intraseptal flow evaluation.      PHYSICIAN INTERPRETATION:  Left Ventricle: Left ventricular systolic function is normal, with an estimated ejection fraction of 60-65%. There  are no regional wall motion abnormalities. The left ventricular cavity size is normal. There is mild concentric left ventricular hypertrophy. Spectral Doppler shows a pseudonormal pattern of left ventricular diastolic filling. There is no definite left ventricular thrombus visualized. The intraventricular septum appears intact without evidence of shunting or a ventricular septal defect.  Left Atrium: The left atrium is moderately dilated. There is no atrial septal defect present.  Right Ventricle: The right ventricle is upper limits of normal in size. There is low normal right ventricular systolic function.  Right Atrium: The right atrium is mildly dilated.  Aortic Valve: The aortic valve is trileaflet. There is mild aortic valve thickening. There is no evidence of aortic valve stenosis.  There is trivial aortic valve regurgitation. The peak instantaneous gradient of the aortic valve is 9.1 mmHg.  Mitral Valve: The mitral valve is mild to moderately thickened. There is no evidence of mitral valve prolapse. There is no evidence of mitral valve stenosis. The doppler estimated mean and peak diastolic pressure gradients are 1.0 mmHg and 5.9 mmHg respectively. There is mild mitral annular calcification. There is mild to moderate mitral valve regurgitation which is centrally directed.  Tricuspid Valve: The tricuspid valve is structurally normal. There is no evidence of tricuspid valve stenosis. There is trace to mild tricuspid regurgitation. The Doppler estimated RVSP is mildly elevated at 40.2 mmHg.  Pulmonic Valve: The pulmonic valve was not assessed. There is trace pulmonic valve regurgitation.  Pericardium: There is no pericardial effusion noted.  Aorta: The aortic root is abnormal. There is mild dilatation of the ascending aorta. The aortic root is at the upper limits of normal size.  In comparison to the previous echocardiogram(s): Prior examinations are available and were reviewed for comparison purposes. Compared  with echo 3/2021 LVEF is unchanged. Mitral and tricuspid valve regurgitation have worsened. There is now bi-atrial enlargement.      CONCLUSIONS:  1. Left ventricular systolic function is normal with a 60-65% estimated ejection fraction.  2. Intact intraventricular septum without shunting or a ventricular septal defect.  3. No left ventricular thrombus visualized.  4. Spectral Doppler shows a pseudonormal pattern of left ventricular diastolic filling.  5. There is low normal right ventricular systolic function.  6. The left atrium is moderately dilated.  7. Mild to moderate mitral valve regurgitation.  8. No evidence of mitral valve prolapse.  9. There is No tricuspid stenosis.  10. Mildly elevated RVSP.  11. Aortic valve stenosis is not present.  12. Compared with echo 3/2021 LVEF is unchanged. Mitral and tricuspid valve regurgitation have worsened. There is now bi-atrial enlargement.    QUANTITATIVE DATA SUMMARY:  2D MEASUREMENTS:  Normal Ranges:  LAs:           5.50 cm    (2.7-4.0cm)  IVSd:          1.30 cm    (0.6-1.1cm)  LVPWd:         1.28 cm    (0.6-1.1cm)  LVIDd:         5.29 cm    (3.9-5.9cm)  LVIDs:         3.40 cm  LV Mass Index: 117.7 g/m2  LV % FS        35.7 %    LA VOLUME:  Normal Ranges:  LA Vol A4C:        73.6 ml    (22+/-6mL/m2)  LA Vol A2C:        103.8 ml  LA Vol BP:         88.0 ml  LA Vol Index A4C:  30.6ml/m2  LA Vol Index A2C:  43.2 ml/m2  LA Vol Index BP:   36.6 ml/m2  LA Area A4C:       23.9 cm2  LA Area A2C:       28.6 cm2  LA Major Axis A4C: 6.6 cm  LA Major Axis A2C: 6.7 cm  LA Volume Index:   42.1 ml/m2  LA Vol A4C:        71.0 ml  LA Vol A2C:        101.0 ml    M-MODE MEASUREMENTS:  Normal Ranges:  Ao Root: 3.50 cm (2.0-3.7cm)    AORTA MEASUREMENTS:  Normal Ranges:  Ao Sinus, d: 3.40 cm (2.1-3.5cm)  Ao STJ, d:   3.70 cm (1.7-3.4cm)  Asc Ao, d:   4.20 cm (2.1-3.4cm)    LV SYSTOLIC FUNCTION BY 2D PLANIMETRY (MOD):  Normal Ranges:  EF-A4C View: 63.3 % (>=55%)  EF-A2C View: 61.7  %  EF-Biplane:  63.4 %    LV DIASTOLIC FUNCTION:  Normal Ranges:  MV Peak E:        1.04 m/s    (0.7-1.2 m/s)  MV Peak A:        0.21 m/s    (0.42-0.7 m/s)  E/A Ratio:        5.02        (1.0-2.2)  MV lateral e'     0.06 m/s  MV medial e'      0.04 m/s  E/e' Ratio:       17.50       (<8.0)  PulmV Sys Toro:    23.40 cm/s  PulmV Delvalle Toro:   59.80 cm/s  PulmV A Revs Toro: 22.10 cm/s  PulmV A Revs Dur: 111.00 msec    MITRAL VALVE:  Normal Ranges:  MV Vmax:    1.21 m/s (<=1.3m/s)  MV peak P.9 mmHg (<5mmHg)  MV mean P.0 mmHg (<48mmHg)  MV DT:      172 msec (150-240msec)    AORTIC VALVE:  Normal Ranges:  AoV Vmax:      1.51 m/s (<=1.7m/s)  AoV Peak P.1 mmHg (<20mmHg)  LVOT Max Toro:  0.96 m/s (<=1.1m/s)  LVOT VTI:      20.50 cm  LVOT Diameter: 2.00 cm  (1.8-2.4cm)  AoV Area,Vmax: 1.99 cm2 (2.5-4.5cm2)      RIGHT VENTRICLE:  RV Basal 4.80 cm  RV Mid   3.80 cm  RV Major 7.9 cm  TAPSE:   23.0 mm  RV s'    0.15 m/s    TRICUSPID VALVE/RVSP:  Normal Ranges:  Peak TR Velocity: 3.05 m/s  RV Syst Pressure: 40.2 mmHg (< 30mmHg)    PULMONIC VALVE:  Normal Ranges:  PV Accel Time: 108 msec (>120ms)  PV Max Toro:    1.2 m/s  (0.6-0.9m/s)  PV Max P.2 mmHg    Pulmonary Veins:  PulmV A Revs Dur: 111.00 msec  PulmV A Revs Toro: 22.10 cm/s  PulmV Delvalle Toro:   59.80 cm/s  PulmV Sys Toro:    23.40 cm/s      [id number] Sheela Varela MD  Electronically signed on 8/15/2023 at 5:30:38 PM             ASSESSMENT/PLAN     Mr. Nguyen is a 83 y.o male, former cigar smoker, being evaluated for follow up visit for Asthma/COPD overlap.      Problem List and Orders  Problem List Items Addressed This Visit    None      Assessment and Plan / Recommendations:    Asthma/COPD overlap  - Continue Symbicort 2 puffs twice daily  - continue albuterol HFA or albuterol nebulizers every 4-6 hours as needed for shortness of breath    Follow up in 12 months

## 2023-10-19 ENCOUNTER — HOSPITAL ENCOUNTER (OUTPATIENT)
Dept: CARDIOLOGY | Facility: HOSPITAL | Age: 83
Discharge: HOME | End: 2023-10-19
Payer: MEDICARE

## 2023-10-19 DIAGNOSIS — Z95.818 PRESENCE OF OTHER CARDIAC IMPLANTS AND GRAFTS: Primary | ICD-10-CM

## 2023-10-19 DIAGNOSIS — I48.0 PAROXYSMAL ATRIAL FIBRILLATION (MULTI): ICD-10-CM

## 2023-10-19 DIAGNOSIS — Z95.818 PRESENCE OF OTHER CARDIAC IMPLANTS AND GRAFTS: ICD-10-CM

## 2023-10-19 PROCEDURE — 93298 REM INTERROG DEV EVAL SCRMS: CPT | Performed by: INTERNAL MEDICINE

## 2023-10-24 ENCOUNTER — HOSPITAL ENCOUNTER (OUTPATIENT)
Dept: PREADMISSION TESTING | Age: 83
Discharge: HOME | End: 2023-10-28
Payer: MEDICARE

## 2023-10-24 VITALS
HEIGHT: 72 IN | WEIGHT: 253.4 LBS | TEMPERATURE: 98 F | DIASTOLIC BLOOD PRESSURE: 72 MMHG | HEART RATE: 49 BPM | SYSTOLIC BLOOD PRESSURE: 129 MMHG | BODY MASS INDEX: 34.32 KG/M2 | OXYGEN SATURATION: 98 % | RESPIRATION RATE: 14 BRPM

## 2023-10-24 DIAGNOSIS — J33.9 NASAL POLYPS: ICD-10-CM

## 2023-10-24 PROBLEM — I27.20 PULMONARY HYPERTENSION (HCC): Status: ACTIVE | Noted: 2023-05-04

## 2023-10-24 PROBLEM — Z95.818 IMPLANTABLE LOOP RECORDER PRESENT: Status: ACTIVE | Noted: 2023-09-13

## 2023-10-24 PROBLEM — N40.0 BPH (BENIGN PROSTATIC HYPERPLASIA): Status: ACTIVE | Noted: 2023-05-04

## 2023-10-24 PROBLEM — J44.9 CHRONIC OBSTRUCTIVE PULMONARY DISEASE (HCC): Status: ACTIVE | Noted: 2023-05-04

## 2023-10-24 PROBLEM — I27.20 PULMONARY HYPERTENSION (HCC): Status: RESOLVED | Noted: 2023-05-04 | Resolved: 2023-10-24

## 2023-10-24 PROBLEM — I21.9 HEART ATTACK (HCC): Status: ACTIVE | Noted: 2023-10-24

## 2023-10-24 PROBLEM — G45.9 TIA (TRANSIENT ISCHEMIC ATTACK): Status: ACTIVE | Noted: 2023-09-13

## 2023-10-24 PROBLEM — I21.9 HEART ATTACK (MULTI): Status: ACTIVE | Noted: 2023-10-24

## 2023-10-24 PROBLEM — I48.0 PAROXYSMAL ATRIAL FIBRILLATION (HCC): Status: ACTIVE | Noted: 2023-05-04

## 2023-10-24 PROBLEM — I25.10 MILD CAD: Status: ACTIVE | Noted: 2023-05-04

## 2023-10-24 PROBLEM — G47.30 SLEEP APNEA: Status: ACTIVE | Noted: 2023-10-24

## 2023-10-24 PROBLEM — J32.9 CHRONIC SINUSITIS: Status: ACTIVE | Noted: 2023-10-24

## 2023-10-24 PROBLEM — I10 BENIGN ESSENTIAL HYPERTENSION: Status: ACTIVE | Noted: 2023-05-04

## 2023-10-24 LAB
ANION GAP SERPL CALCULATED.3IONS-SCNC: 7 MEQ/L (ref 9–15)
APTT PPP: 28.4 SEC (ref 24.4–36.8)
BUN SERPL-MCNC: 17 MG/DL (ref 8–23)
CALCIUM SERPL-MCNC: 9.1 MG/DL (ref 8.5–9.9)
CHLORIDE SERPL-SCNC: 107 MEQ/L (ref 95–107)
CO2 SERPL-SCNC: 28 MEQ/L (ref 20–31)
CREAT SERPL-MCNC: 1.02 MG/DL (ref 0.7–1.2)
ERYTHROCYTE [DISTWIDTH] IN BLOOD BY AUTOMATED COUNT: 12.9 % (ref 11.5–14.5)
GLUCOSE SERPL-MCNC: 103 MG/DL (ref 70–99)
HCT VFR BLD AUTO: 40.9 % (ref 42–52)
HGB BLD-MCNC: 12.9 G/DL (ref 14–18)
INR PPP: 1.2
MCH RBC QN AUTO: 30.4 PG (ref 27–31.3)
MCHC RBC AUTO-ENTMCNC: 31.5 % (ref 33–37)
MCV RBC AUTO: 96.5 FL (ref 79–92.2)
PLATELET # BLD AUTO: 133 K/UL (ref 130–400)
POTASSIUM SERPL-SCNC: 4.4 MEQ/L (ref 3.4–4.9)
PROTHROMBIN TIME: 14.8 SEC (ref 12.3–14.9)
RBC # BLD AUTO: 4.24 M/UL (ref 4.7–6.1)
SODIUM SERPL-SCNC: 142 MEQ/L (ref 135–144)
WBC # BLD AUTO: 6.3 K/UL (ref 4.8–10.8)

## 2023-10-24 PROCEDURE — 80048 BASIC METABOLIC PNL TOTAL CA: CPT

## 2023-10-24 PROCEDURE — 85610 PROTHROMBIN TIME: CPT

## 2023-10-24 PROCEDURE — 93005 ELECTROCARDIOGRAM TRACING: CPT

## 2023-10-24 PROCEDURE — 85027 COMPLETE CBC AUTOMATED: CPT

## 2023-10-24 PROCEDURE — 85730 THROMBOPLASTIN TIME PARTIAL: CPT

## 2023-10-24 RX ORDER — GUAIFENESIN 1200 MG/1
1200 TABLET, EXTENDED RELEASE ORAL EVERY 12 HOURS PRN
COMMUNITY

## 2023-10-24 RX ORDER — CALCIUM CARBONATE 500(1250)
500 TABLET ORAL DAILY
COMMUNITY

## 2023-10-24 RX ORDER — ASPIRIN 81 MG/1
1 TABLET ORAL DAILY
COMMUNITY

## 2023-10-24 RX ORDER — PREDNISONE 20 MG/1
1 TABLET ORAL DAILY
COMMUNITY
Start: 2023-10-09

## 2023-10-24 RX ORDER — TERAZOSIN 10 MG/1
10 CAPSULE ORAL NIGHTLY
COMMUNITY
Start: 2023-08-25

## 2023-10-24 RX ORDER — ENOXAPARIN SODIUM 300 MG/3ML
INJECTION INTRAVENOUS; SUBCUTANEOUS 2 TIMES DAILY
COMMUNITY

## 2023-10-24 RX ORDER — PANTOPRAZOLE SODIUM 40 MG/1
1 TABLET, DELAYED RELEASE ORAL DAILY
COMMUNITY
Start: 2023-08-25

## 2023-10-24 RX ORDER — BUDESONIDE AND FORMOTEROL FUMARATE DIHYDRATE 80; 4.5 UG/1; UG/1
2 AEROSOL RESPIRATORY (INHALATION) 2 TIMES DAILY
COMMUNITY

## 2023-10-24 RX ORDER — LORATADINE 10 MG/1
10 TABLET ORAL DAILY
COMMUNITY

## 2023-10-24 RX ORDER — FLUTICASONE PROPIONATE 50 MCG
1 SPRAY, SUSPENSION (ML) NASAL PRN
COMMUNITY

## 2023-10-24 RX ORDER — PROPRANOLOL HYDROCHLORIDE 20 MG/1
20 TABLET ORAL 2 TIMES DAILY
COMMUNITY
Start: 2023-08-25

## 2023-10-24 RX ORDER — LIDOCAINE HYDROCHLORIDE 10 MG/ML
1 INJECTION, SOLUTION EPIDURAL; INFILTRATION; INTRACAUDAL; PERINEURAL
OUTPATIENT
Start: 2023-10-24 | End: 2023-10-25

## 2023-10-24 RX ORDER — URSODIOL 300 MG/1
300 CAPSULE ORAL 2 TIMES DAILY
COMMUNITY
Start: 2023-08-25

## 2023-10-24 RX ORDER — ELECTROLYTES/DEXTROSE
1 SOLUTION, ORAL ORAL DAILY
COMMUNITY

## 2023-10-24 RX ORDER — CALCIUM CARBONATE 500(1250)
1250 TABLET ORAL DAILY
COMMUNITY
End: 2023-10-24

## 2023-10-24 RX ORDER — SODIUM CHLORIDE 0.9 % (FLUSH) 0.9 %
5-40 SYRINGE (ML) INJECTION PRN
OUTPATIENT
Start: 2023-10-24

## 2023-10-24 RX ORDER — ALBUTEROL SULFATE 90 UG/1
1 AEROSOL, METERED RESPIRATORY (INHALATION) EVERY 4 HOURS PRN
COMMUNITY

## 2023-10-24 RX ORDER — ALBUTEROL SULFATE 2.5 MG/3ML
2.5 SOLUTION RESPIRATORY (INHALATION) EVERY 6 HOURS PRN
COMMUNITY

## 2023-10-24 RX ORDER — DUTASTERIDE 0.5 MG/1
1 CAPSULE, LIQUID FILLED ORAL DAILY
COMMUNITY

## 2023-10-24 RX ORDER — VALSARTAN 80 MG/1
80 TABLET ORAL DAILY
COMMUNITY
Start: 2023-08-25

## 2023-10-24 RX ORDER — ATORVASTATIN CALCIUM 80 MG/1
1 TABLET, FILM COATED ORAL DAILY
COMMUNITY
Start: 2023-05-08

## 2023-10-24 RX ORDER — SODIUM CHLORIDE 9 MG/ML
INJECTION, SOLUTION INTRAVENOUS PRN
OUTPATIENT
Start: 2023-10-24

## 2023-10-24 RX ORDER — SOTALOL HYDROCHLORIDE 80 MG/1
80 TABLET ORAL 2 TIMES DAILY
COMMUNITY
Start: 2023-08-25

## 2023-10-24 RX ORDER — SODIUM CHLORIDE 0.9 % (FLUSH) 0.9 %
5-40 SYRINGE (ML) INJECTION EVERY 12 HOURS SCHEDULED
OUTPATIENT
Start: 2023-10-24

## 2023-10-24 ASSESSMENT — ENCOUNTER SYMPTOMS
CHEST TIGHTNESS: 0
DIARRHEA: 0
EYE DISCHARGE: 0
ABDOMINAL PAIN: 0
BACK PAIN: 1
RHINORRHEA: 1
SHORTNESS OF BREATH: 0
EYE PAIN: 0
NAUSEA: 0
TROUBLE SWALLOWING: 0
CONSTIPATION: 0
COUGH: 0
PHOTOPHOBIA: 0
FACIAL SWELLING: 0
ABDOMINAL DISTENTION: 0
VOMITING: 0
SORE THROAT: 0
EYE ITCHING: 0
WHEEZING: 0
BLOOD IN STOOL: 0
SINUS PAIN: 0
SINUS PRESSURE: 0
ALLERGIC/IMMUNOLOGIC NEGATIVE: 1
EYE REDNESS: 0

## 2023-10-25 ENCOUNTER — OFFICE VISIT (OUTPATIENT)
Dept: CARDIOLOGY | Facility: CLINIC | Age: 83
End: 2023-10-25
Payer: MEDICARE

## 2023-10-25 VITALS
HEIGHT: 72 IN | RESPIRATION RATE: 18 BRPM | SYSTOLIC BLOOD PRESSURE: 116 MMHG | HEART RATE: 56 BPM | BODY MASS INDEX: 34.27 KG/M2 | WEIGHT: 253 LBS | DIASTOLIC BLOOD PRESSURE: 68 MMHG | OXYGEN SATURATION: 95 %

## 2023-10-25 DIAGNOSIS — I10 BENIGN ESSENTIAL HYPERTENSION: ICD-10-CM

## 2023-10-25 DIAGNOSIS — I25.10 CORONARY ARTERY DISEASE INVOLVING NATIVE CORONARY ARTERY OF NATIVE HEART WITHOUT ANGINA PECTORIS: ICD-10-CM

## 2023-10-25 DIAGNOSIS — E78.5 HYPERLIPIDEMIA, UNSPECIFIED HYPERLIPIDEMIA TYPE: ICD-10-CM

## 2023-10-25 DIAGNOSIS — Z01.818 PRE-OPERATIVE CLEARANCE: Primary | ICD-10-CM

## 2023-10-25 PROBLEM — J32.9 CHRONIC SINUSITIS: Status: ACTIVE | Noted: 2023-10-24

## 2023-10-25 PROCEDURE — 1125F AMNT PAIN NOTED PAIN PRSNT: CPT | Performed by: NURSE PRACTITIONER

## 2023-10-25 PROCEDURE — 3078F DIAST BP <80 MM HG: CPT | Performed by: NURSE PRACTITIONER

## 2023-10-25 PROCEDURE — 3074F SYST BP LT 130 MM HG: CPT | Performed by: NURSE PRACTITIONER

## 2023-10-25 PROCEDURE — 1036F TOBACCO NON-USER: CPT | Performed by: NURSE PRACTITIONER

## 2023-10-25 PROCEDURE — 1160F RVW MEDS BY RX/DR IN RCRD: CPT | Performed by: NURSE PRACTITIONER

## 2023-10-25 PROCEDURE — 1159F MED LIST DOCD IN RCRD: CPT | Performed by: NURSE PRACTITIONER

## 2023-10-25 PROCEDURE — 99214 OFFICE O/P EST MOD 30 MIN: CPT | Performed by: NURSE PRACTITIONER

## 2023-10-25 NOTE — PROGRESS NOTES
Name : Juan Carlos Nguyen   : 1940   MRN : [id number]   ENC Date : 10/25/2023    HPI:  Mr. Nguyen is an 84 y/o male with PMHx sig for paroxysmal A. fib, managed with sotalol and anticoagulation. He has history of symptomatic bradycardia with associated dizziness, in , improved after stopping his propranolol.  In 2020, he was hospitalized with chest pain and was ruled in for acute non-STEMI with a troponin of 0.21. Coronary angiography revealed moderate nonobstructive CAD. LV function was reported to be normal. He is also on treatment for HTN & HLD. In August of this year  he presented to [facility] ED with weakness & was diagnosed with stroke. He has since been continued on Eliquis & started on ASA 81mg every day. He follows with Dr. Hopkins & recently had his loop recorder replaced. He present today for cardiac clearance for nasal polyp removal.    Since his stroke in August he has been well. Denies any chest pain, pressure, SOB/CRAVEN, PND, orthopnea, LE edema, palpitations, lightheadedness, dizziness, or syncope. Plans to undergo nasal polyp removal as it is causing him difficulty smelling.    Problem list overview:   Patient Active Problem List   Diagnosis    Abnormal echocardiogram    Actinic keratosis    Allergic rhinitis    Anxiety    Arrhythmia    Ventricular ectopy    Asthma    Basal cell carcinoma    Benign essential hypertension    BPH (benign prostatic hyperplasia)    Choledocholithiasis    Chronic obstructive pulmonary disease (CMS/HCC)    Dysuria-frequency syndrome    Essential tremor    Mastodynia    Lung nodules    Lump or mass in breast    Hyperlipidemia    History of ST elevation myocardial infarction (STEMI)    Gastroesophageal reflux disease without esophagitis    Frequent PVCs    MGD (meibomian gland dysfunction)    Mild CAD    Nuclear sclerosis of both eyes    Class 1 obesity    Obstructive sleep apnea    Osteoarthritis of knee    PAC (premature atrial contraction)    Paroxysmal atrial  fibrillation (CMS/HCC)    Pulmonary hypertension (CMS/HCC)    PVD (posterior vitreous detachment), both eyes    Recurrent nephrolithiasis    Retained gallstones following laparoscopic cholecystectomy    Seborrheic keratosis, inflamed    Sinus bradycardia    Intention tremor    Trichiasis of right eyelid    Venous lake of lip    COPD (chronic obstructive pulmonary disease) (CMS/HCC)    Bilateral presbyopia    Bilateral sensorineural hearing loss    Nasal polyps    Stroke-like symptoms    Implantable loop recorder present    Localized, primary osteoarthritis    TIA (transient ischemic attack)    Anticoagulant long-term use    Heart attack (CMS/HCC)    Chronic sinusitis       Meds:   Current Outpatient Medications on File Prior to Visit   Medication Sig Dispense Refill    albuterol 2.5 mg /3 mL (0.083 %) nebulizer solution Take 3 mL (2.5 mg) by nebulization every 6 hours if needed for wheezing.      albuterol 90 mcg/actuation inhaler Inhale 1 puff every 4 hours if needed for wheezing.      apixaban (Eliquis) 5 mg tablet TAKE ONE TABLET BY MOUTH TWO TIMES A  tablet 3    aspirin 81 mg EC tablet       atorvastatin (Lipitor) 80 mg tablet TAKE ONE TABLET BY MOUTH EVERY DAY 90 tablet 1    budesonide-formoteroL (Symbicort) 80-4.5 mcg/actuation inhaler Inhale 2 puffs 2 times a day. Rinse mouth with water after use to reduce aftertaste and incidence of candidiasis. Do not swallow.      calcium 500 mg calcium (1,250 mg) tablet Take 1 tablet (1,250 mg) by mouth once daily.      cholecalciferol (Vitamin D3) 5,000 Units tablet Take by mouth once daily.      dutasteride (Avodart) 0.5 mg capsule Take 1 capsule (0.5 mg) by mouth once daily.      fluticasone (Flonase) 50 mcg/actuation nasal spray Administer 1 spray into each nostril if needed for rhinitis. Shake gently. Before first use, prime pump. After use, clean tip and replace cap.      guaiFENesin (Mucinex) 1,200 mg tablet extended release 12hr Take 1 tablet (1,200 mg) by  mouth every 12 hours if needed.      loratadine (Claritin) 10 mg tablet Take 1 tablet (10 mg) by mouth once daily.      omega 3-dha-epa-fish oil (Fish OiL) 1,200 (144-216) mg capsule Take by mouth.      pantoprazole (ProtoNix) 40 mg EC tablet Take 1 tablet (40 mg) by mouth once daily. Do not crush, chew, or split. 90 tablet 1    predniSONE (Deltasone) 20 mg tablet 2 tabs daily x3 days then 1 tablet daily for 2 days then 1/2 tablet for 2 days 9 tablet 0    propranolol (Inderal) 20 mg tablet Take 1 tablet (20 mg) by mouth 2 times a day. 180 tablet 1    sotalol (Betapace) 80 mg tablet Take 1 tablet (80 mg) by mouth every 12 hours. 180 tablet 1    terazosin (Hytrin) 10 mg capsule Take 1 capsule (10 mg) by mouth once daily at bedtime. 90 capsule 1    terazosin (Hytrin) 5 mg capsule Take 1 capsule (5 mg) by mouth once daily at bedtime. 90 capsule 1    ursodiol (Actigall) 300 mg capsule Take 1 capsule (300 mg) by mouth 2 times a day. 180 capsule 1    valsartan (Diovan) 80 mg tablet Take 1 tablet (80 mg) by mouth once daily. for blood pressure 90 tablet 1    zinc gluconate 50 mg tablet Take by mouth.      [DISCONTINUED] cephalexin (Keflex) 500 mg capsule TAKE ONE CAPSULE BY MOUTH TWO TIMES A DAY FOR 7 DAYS      [DISCONTINUED] enoxaparin (Lovenox) 100 mg/mL syringe hold Eliquis 3 days before procedure. give injection twice daily two days before procedure. One in am day before procedure , none day of procedure. twice daily injection day after procedure. Resume Eliquis.       No current facility-administered medications on file prior to visit.        ROS:  unless otherwise noted in the history of present illness, all other systems were reviewed and they are negative for complaints      VS:  /68 (BP Location: Left arm, Patient Position: Sitting)   Pulse 56   Resp 18   Ht 1.829 m (6')   Wt 115 kg (253 lb)   SpO2 95%   BMI 34.31 kg/m²     Physical Exam:  On exam Mr. Nguyen appears his stated age, is alert and oriented  x3, and in no acute distress. His sclera are anicteric and his oropharynx has moist mucous membranes. His neck is supple and without thyromegaly. The JVP is ~5 cm of water above the right atrium. His cardiac exam has regular rhythm, normal S1, S2. No S3/4. There are no murmurs. His lungs are clear to auscultation bilaterally and there is no dullness to percussion. His abdomen is soft, nontender with normoactive bowel sounds. There is no HJR. The extremities are warm and without edema. The skin is dry. There is no rash present. The distal pulses are 2-3+ in all four extremities. His mood and affect are appropriate for todays encounter.      ECG: Shows Sinus Bradycardia with HR 52 bpm, no ischemic changes    Assessment and Plan:  Preoperative Clearance for nasal polyp removal. Based on the Revised Cardiac Risk Index Mr Nguyen is a Class III risk with 10.1% 30 day risk of death, MI, or cardiac arrest. Mr Nguyen is a high but acceptable risk. Needs close perioperative assessment with low threshold for additional cardiac diagnostics as determined at the time. Given his recent stroke he will need bridged with Lovenox in order to hold his DOAC.  Nonobstructive CAD. [facility] 2020 in setting of NSTEMI. Continues on high dose statin. No angina. EKG is baseline.  pAfib. Follows with Dr. Hopkins. Recently had loop recorder replaced  HTN. Well controlled on current regimen.  HLD. Tolerating statin well.    Keep follow up with Dr. Barraza in December    Tracy M Schwab, APRN-CNP

## 2023-10-26 LAB
EKG ATRIAL RATE: 49 BPM
EKG P AXIS: 65 DEGREES
EKG P-R INTERVAL: 206 MS
EKG Q-T INTERVAL: 482 MS
EKG QRS DURATION: 94 MS
EKG QTC CALCULATION (BAZETT): 435 MS
EKG R AXIS: 10 DEGREES
EKG T AXIS: 29 DEGREES
EKG VENTRICULAR RATE: 49 BPM

## 2023-10-30 ENCOUNTER — ANESTHESIA EVENT (OUTPATIENT)
Dept: OPERATING ROOM | Age: 83
End: 2023-10-30
Payer: MEDICARE

## 2023-10-31 ENCOUNTER — HOSPITAL ENCOUNTER (OUTPATIENT)
Age: 83
Setting detail: OUTPATIENT SURGERY
Discharge: HOME | End: 2023-10-31
Attending: OTOLARYNGOLOGY | Admitting: OTOLARYNGOLOGY
Payer: MEDICARE

## 2023-10-31 ENCOUNTER — ANESTHESIA (OUTPATIENT)
Dept: OPERATING ROOM | Age: 83
End: 2023-10-31
Payer: MEDICARE

## 2023-10-31 ENCOUNTER — TELEPHONE (OUTPATIENT)
Dept: CARDIOLOGY | Facility: CLINIC | Age: 83
End: 2023-10-31
Payer: MEDICARE

## 2023-10-31 VITALS
SYSTOLIC BLOOD PRESSURE: 108 MMHG | DIASTOLIC BLOOD PRESSURE: 62 MMHG | RESPIRATION RATE: 16 BRPM | TEMPERATURE: 97 F | BODY MASS INDEX: 34.27 KG/M2 | HEART RATE: 51 BPM | WEIGHT: 253 LBS | HEIGHT: 72 IN | OXYGEN SATURATION: 95 %

## 2023-10-31 DIAGNOSIS — J33.9 NASAL POLYP: ICD-10-CM

## 2023-10-31 DIAGNOSIS — J32.9 CHRONIC SINUSITIS, UNSPECIFIED LOCATION: ICD-10-CM

## 2023-10-31 PROCEDURE — 2709999900 HC NON-CHARGEABLE SUPPLY: Performed by: OTOLARYNGOLOGY

## 2023-10-31 PROCEDURE — 6360000002 HC RX W HCPCS: Performed by: OTOLARYNGOLOGY

## 2023-10-31 PROCEDURE — 6370000000 HC RX 637 (ALT 250 FOR IP)

## 2023-10-31 PROCEDURE — 7100000001 HC PACU RECOVERY - ADDTL 15 MIN: Performed by: OTOLARYNGOLOGY

## 2023-10-31 PROCEDURE — 6370000000 HC RX 637 (ALT 250 FOR IP): Performed by: OTOLARYNGOLOGY

## 2023-10-31 PROCEDURE — 2580000003 HC RX 258: Performed by: OTOLARYNGOLOGY

## 2023-10-31 PROCEDURE — 7100000010 HC PHASE II RECOVERY - FIRST 15 MIN: Performed by: OTOLARYNGOLOGY

## 2023-10-31 PROCEDURE — 2580000003 HC RX 258

## 2023-10-31 PROCEDURE — 3700000001 HC ADD 15 MINUTES (ANESTHESIA): Performed by: OTOLARYNGOLOGY

## 2023-10-31 PROCEDURE — 6360000002 HC RX W HCPCS: Performed by: STUDENT IN AN ORGANIZED HEALTH CARE EDUCATION/TRAINING PROGRAM

## 2023-10-31 PROCEDURE — 88305 TISSUE EXAM BY PATHOLOGIST: CPT

## 2023-10-31 PROCEDURE — 3700000000 HC ANESTHESIA ATTENDED CARE: Performed by: OTOLARYNGOLOGY

## 2023-10-31 PROCEDURE — 2720000010 HC SURG SUPPLY STERILE: Performed by: OTOLARYNGOLOGY

## 2023-10-31 PROCEDURE — 7100000011 HC PHASE II RECOVERY - ADDTL 15 MIN: Performed by: OTOLARYNGOLOGY

## 2023-10-31 PROCEDURE — 6360000002 HC RX W HCPCS: Performed by: NURSE ANESTHETIST, CERTIFIED REGISTERED

## 2023-10-31 PROCEDURE — 3600000014 HC SURGERY LEVEL 4 ADDTL 15MIN: Performed by: OTOLARYNGOLOGY

## 2023-10-31 PROCEDURE — 7100000000 HC PACU RECOVERY - FIRST 15 MIN: Performed by: OTOLARYNGOLOGY

## 2023-10-31 PROCEDURE — 2500000003 HC RX 250 WO HCPCS: Performed by: NURSE ANESTHETIST, CERTIFIED REGISTERED

## 2023-10-31 PROCEDURE — 3600000004 HC SURGERY LEVEL 4 BASE: Performed by: OTOLARYNGOLOGY

## 2023-10-31 PROCEDURE — A4217 STERILE WATER/SALINE, 500 ML: HCPCS | Performed by: OTOLARYNGOLOGY

## 2023-10-31 DEVICE — PROPEL MINI SINUS IMPLANT WITH STRAIGHT DELIVERY SYSTEM (SDS)
Type: IMPLANTABLE DEVICE | Site: NOSE | Status: FUNCTIONAL
Brand: PROPEL MINI SINUS IMPLANT WITH STRAIGHT DELIVERY SYSTEM

## 2023-10-31 RX ORDER — ACETAMINOPHEN 325 MG/1
650 TABLET ORAL ONCE
Status: DISCONTINUED | OUTPATIENT
Start: 2023-10-31 | End: 2023-10-31 | Stop reason: HOSPADM

## 2023-10-31 RX ORDER — HYDRALAZINE HYDROCHLORIDE 20 MG/ML
INJECTION INTRAMUSCULAR; INTRAVENOUS PRN
Status: DISCONTINUED | OUTPATIENT
Start: 2023-10-31 | End: 2023-10-31 | Stop reason: SDUPTHER

## 2023-10-31 RX ORDER — FENTANYL CITRATE 0.05 MG/ML
25 INJECTION, SOLUTION INTRAMUSCULAR; INTRAVENOUS EVERY 5 MIN PRN
Status: COMPLETED | OUTPATIENT
Start: 2023-10-31 | End: 2023-10-31

## 2023-10-31 RX ORDER — SODIUM CHLORIDE 0.9 % (FLUSH) 0.9 %
5-40 SYRINGE (ML) INJECTION EVERY 12 HOURS SCHEDULED
Status: DISCONTINUED | OUTPATIENT
Start: 2023-10-31 | End: 2023-10-31 | Stop reason: HOSPADM

## 2023-10-31 RX ORDER — PROPOFOL 10 MG/ML
INJECTION, EMULSION INTRAVENOUS PRN
Status: DISCONTINUED | OUTPATIENT
Start: 2023-10-31 | End: 2023-10-31 | Stop reason: SDUPTHER

## 2023-10-31 RX ORDER — ONDANSETRON 2 MG/ML
4 INJECTION INTRAMUSCULAR; INTRAVENOUS
Status: DISCONTINUED | OUTPATIENT
Start: 2023-10-31 | End: 2023-10-31 | Stop reason: HOSPADM

## 2023-10-31 RX ORDER — MAGNESIUM HYDROXIDE 1200 MG/15ML
LIQUID ORAL CONTINUOUS PRN
Status: DISCONTINUED | OUTPATIENT
Start: 2023-10-31 | End: 2023-10-31 | Stop reason: HOSPADM

## 2023-10-31 RX ORDER — ONDANSETRON 2 MG/ML
INJECTION INTRAMUSCULAR; INTRAVENOUS PRN
Status: DISCONTINUED | OUTPATIENT
Start: 2023-10-31 | End: 2023-10-31 | Stop reason: SDUPTHER

## 2023-10-31 RX ORDER — HYDROCODONE BITARTRATE AND ACETAMINOPHEN 5; 325 MG/1; MG/1
1 TABLET ORAL EVERY 4 HOURS PRN
Qty: 20 TABLET | Refills: 0 | Status: SHIPPED | OUTPATIENT
Start: 2023-10-31 | End: 2023-11-07

## 2023-10-31 RX ORDER — SODIUM CHLORIDE 9 MG/ML
INJECTION, SOLUTION INTRAVENOUS PRN
Status: DISCONTINUED | OUTPATIENT
Start: 2023-10-31 | End: 2023-10-31 | Stop reason: HOSPADM

## 2023-10-31 RX ORDER — SODIUM CHLORIDE 0.9 % (FLUSH) 0.9 %
5-40 SYRINGE (ML) INJECTION PRN
Status: DISCONTINUED | OUTPATIENT
Start: 2023-10-31 | End: 2023-10-31 | Stop reason: HOSPADM

## 2023-10-31 RX ORDER — LIDOCAINE HYDROCHLORIDE 10 MG/ML
1 INJECTION, SOLUTION EPIDURAL; INFILTRATION; INTRACAUDAL; PERINEURAL
Status: DISCONTINUED | OUTPATIENT
Start: 2023-10-31 | End: 2023-10-31 | Stop reason: HOSPADM

## 2023-10-31 RX ORDER — CEFDINIR 300 MG/1
300 CAPSULE ORAL 2 TIMES DAILY
Qty: 28 CAPSULE | Refills: 0 | Status: SHIPPED | OUTPATIENT
Start: 2023-10-31 | End: 2023-11-14

## 2023-10-31 RX ORDER — METOCLOPRAMIDE HYDROCHLORIDE 5 MG/ML
10 INJECTION INTRAMUSCULAR; INTRAVENOUS
Status: DISCONTINUED | OUTPATIENT
Start: 2023-10-31 | End: 2023-10-31 | Stop reason: HOSPADM

## 2023-10-31 RX ORDER — ROCURONIUM BROMIDE 10 MG/ML
INJECTION, SOLUTION INTRAVENOUS PRN
Status: DISCONTINUED | OUTPATIENT
Start: 2023-10-31 | End: 2023-10-31 | Stop reason: SDUPTHER

## 2023-10-31 RX ORDER — FENTANYL CITRATE 50 UG/ML
INJECTION, SOLUTION INTRAMUSCULAR; INTRAVENOUS PRN
Status: DISCONTINUED | OUTPATIENT
Start: 2023-10-31 | End: 2023-10-31 | Stop reason: SDUPTHER

## 2023-10-31 RX ORDER — OXYCODONE HYDROCHLORIDE 5 MG/1
5 TABLET ORAL
Status: DISCONTINUED | OUTPATIENT
Start: 2023-10-31 | End: 2023-10-31 | Stop reason: HOSPADM

## 2023-10-31 RX ORDER — LIDOCAINE HYDROCHLORIDE 10 MG/ML
INJECTION, SOLUTION EPIDURAL; INFILTRATION; INTRACAUDAL; PERINEURAL PRN
Status: DISCONTINUED | OUTPATIENT
Start: 2023-10-31 | End: 2023-10-31 | Stop reason: SDUPTHER

## 2023-10-31 RX ORDER — MIDAZOLAM HYDROCHLORIDE 1 MG/ML
INJECTION INTRAMUSCULAR; INTRAVENOUS PRN
Status: DISCONTINUED | OUTPATIENT
Start: 2023-10-31 | End: 2023-10-31 | Stop reason: SDUPTHER

## 2023-10-31 RX ORDER — OXYMETAZOLINE HYDROCHLORIDE 0.05 G/100ML
SPRAY NASAL PRN
Status: DISCONTINUED | OUTPATIENT
Start: 2023-10-31 | End: 2023-10-31 | Stop reason: HOSPADM

## 2023-10-31 RX ORDER — DEXAMETHASONE SODIUM PHOSPHATE 10 MG/ML
INJECTION INTRAMUSCULAR; INTRAVENOUS PRN
Status: DISCONTINUED | OUTPATIENT
Start: 2023-10-31 | End: 2023-10-31 | Stop reason: SDUPTHER

## 2023-10-31 RX ORDER — DIPHENHYDRAMINE HYDROCHLORIDE 50 MG/ML
12.5 INJECTION INTRAMUSCULAR; INTRAVENOUS
Status: DISCONTINUED | OUTPATIENT
Start: 2023-10-31 | End: 2023-10-31 | Stop reason: HOSPADM

## 2023-10-31 RX ADMIN — PROPOFOL 150 MG: 10 INJECTION, EMULSION INTRAVENOUS at 08:56

## 2023-10-31 RX ADMIN — FENTANYL CITRATE 100 MCG: 50 INJECTION, SOLUTION INTRAMUSCULAR; INTRAVENOUS at 08:56

## 2023-10-31 RX ADMIN — ONDANSETRON 4 MG: 2 INJECTION INTRAMUSCULAR; INTRAVENOUS at 09:02

## 2023-10-31 RX ADMIN — HYDRALAZINE HYDROCHLORIDE 6 MG: 20 INJECTION INTRAMUSCULAR; INTRAVENOUS at 09:35

## 2023-10-31 RX ADMIN — SODIUM CHLORIDE: 9 INJECTION, SOLUTION INTRAVENOUS at 08:11

## 2023-10-31 RX ADMIN — HYDRALAZINE HYDROCHLORIDE 6 MG: 20 INJECTION INTRAMUSCULAR; INTRAVENOUS at 10:20

## 2023-10-31 RX ADMIN — LIDOCAINE HYDROCHLORIDE 25 MG: 10 INJECTION, SOLUTION EPIDURAL; INFILTRATION; INTRACAUDAL; PERINEURAL at 08:56

## 2023-10-31 RX ADMIN — MIDAZOLAM HYDROCHLORIDE 2 MG: 1 INJECTION, SOLUTION INTRAMUSCULAR; INTRAVENOUS at 09:02

## 2023-10-31 RX ADMIN — SUGAMMADEX 200 MG: 100 INJECTION, SOLUTION INTRAVENOUS at 10:20

## 2023-10-31 RX ADMIN — HYDRALAZINE HYDROCHLORIDE 4 MG: 20 INJECTION INTRAMUSCULAR; INTRAVENOUS at 10:35

## 2023-10-31 RX ADMIN — FENTANYL CITRATE 25 MCG: 50 INJECTION INTRAMUSCULAR; INTRAVENOUS at 10:52

## 2023-10-31 RX ADMIN — CEFAZOLIN 2000 MG: 2 INJECTION, POWDER, FOR SOLUTION INTRAMUSCULAR; INTRAVENOUS at 09:02

## 2023-10-31 RX ADMIN — ROCURONIUM BROMIDE 50 MG: 10 INJECTION, SOLUTION INTRAVENOUS at 08:56

## 2023-10-31 RX ADMIN — DEXAMETHASONE SODIUM PHOSPHATE 10 MG: 10 INJECTION INTRAMUSCULAR; INTRAVENOUS at 09:02

## 2023-10-31 RX ADMIN — SODIUM CHLORIDE: 9 INJECTION, SOLUTION INTRAVENOUS at 10:20

## 2023-10-31 RX ADMIN — FENTANYL CITRATE 25 MCG: 50 INJECTION INTRAMUSCULAR; INTRAVENOUS at 11:06

## 2023-10-31 RX ADMIN — SODIUM CHLORIDE: 9 INJECTION, SOLUTION INTRAVENOUS at 08:54

## 2023-10-31 ASSESSMENT — PAIN DESCRIPTION - DESCRIPTORS
DESCRIPTORS: SORE

## 2023-10-31 ASSESSMENT — PAIN DESCRIPTION - PAIN TYPE: TYPE: SURGICAL PAIN

## 2023-10-31 ASSESSMENT — PAIN SCALES - GENERAL
PAINLEVEL_OUTOF10: 3
PAINLEVEL_OUTOF10: 2
PAINLEVEL_OUTOF10: 0
PAINLEVEL_OUTOF10: 5
PAINLEVEL_OUTOF10: 2
PAINLEVEL_OUTOF10: 2
PAINLEVEL_OUTOF10: 3
PAINLEVEL_OUTOF10: 2
PAINLEVEL_OUTOF10: 4
PAINLEVEL_OUTOF10: 4

## 2023-10-31 ASSESSMENT — PAIN DESCRIPTION - LOCATION
LOCATION: NOSE
LOCATION: OTHER (COMMENT)
LOCATION: OTHER (COMMENT)

## 2023-10-31 ASSESSMENT — PAIN DESCRIPTION - ORIENTATION: ORIENTATION: MID

## 2023-10-31 NOTE — OP NOTE
[facility] [place], [address] [facility]                                OPERATIVE REPORT    PATIENT NAME: Cristal Gomez                    :        1940  MED REC NO:   [id number]                            ROOM:  ACCOUNT NO:   [de-identified]                           ADMIT DATE: 10/31/2023  PROVIDER:     Nasreen Chinchilla MD    DATE OF PROCEDURE:  10/31/2023    DIAGNOSIS:  Chronic pansinusitis with sinonasal polyposis. OPERATIONS PERFORMED:  1.  Bilateral transnasal endoscopic anterior and posterior ethmoidectomy  with sphenoidotomy and removal of tissue. 2.  Bilateral transnasal endoscopic frontal sinusotomy with removal of  tissue. 3.  Bilateral transnasal endoscopic maxillary antrostomy with removal of  tissue. SURGEON:  Nasreen Chinchilla MD    ANESTHESIA:  General.    INDICATIONS:  An 82-year-old male with significant history of severe  chronic sinonasal polyposis with recommendation for definitive  endoscopic sinus surgery to optimize his airway and hopefully facilitate  long-term control of same. OPERATIVE PROCEDURE:  The patient was taken to the operating room and  administered general anesthesia. Utilizing the RippleFunction navigation  system, all appropriate fiduciaries were demarcated with intraoperative  variance of less than 1.5 mm. Once that was accomplished, the patient  had appropriate prep, drape and time-out performed. He had previously  been sprayed with Afrin spray and under direct endoscopic visualization  restarted on the right-hand side. All grossly visualized polypoid  tissue was taken down to microdebrider working from an anterior to  posterior and from an inferior to superior direction. The middle  turbinate was gently medialized.   I opened up the frontal recess  inferiorly and took down polypoid tissue from same opening recess  appropriately to facilitate access to same with clearance of any  polypoid tissue not region

## 2023-10-31 NOTE — PROGRESS NOTES
Received from or into pacu on a cart awake and responsive, see assessment. Accompanied in by Sonny Gomez crna.

## 2023-10-31 NOTE — PROGRESS NOTES
Dr Ree Castle notfifed of patient's complaint of \"clenching discomfort,\" no new orders at this time.

## 2023-10-31 NOTE — BRIEF OP NOTE
Brief Postoperative Note      Patient: Franck Garcia  YOB: 1940  MRN: [id number]    Date of Procedure: 10/31/2023    Pre-Op Diagnosis Codes:     * Chronic sinusitis, unspecified location [J32.9]     * Nasal polyp [J33.9]    Post-Op Diagnosis: Same       Procedure(s):  ENDOSCOPIC ETHMOIDECTOMIES MAXILLARY ANSTROSTOMIES NASAL POLYPECTOMY FRONTAL SINUSOTOMY    Surgeon(s):  Michelle Batres MD    Assistant:  First Assistant: Cherylynn Nageotte    Anesthesia: General    Estimated Blood Loss (mL): Minimal    Complications: None    Specimens:   ID Type Source Tests Collected by Time Destination   A : sinonasal contents RIGHT Tissue Nose SURGICAL PATHOLOGY Michelle Batres MD 10/31/2023 0919    B : sinonasal contents LEFT Tissue Nose SURGICAL PATHOLOGY Michelle Batres MD 10/31/2023 7794        Implants:  Implant Name Type Inv.  Item Serial No.  Lot No. LRB No. Used Action   IMPLANT NSL MINI PROPEL - [id number]  IMPLANT NSL MINI PROPEL  [employer]- [id number] N/A 2 Implanted         Drains: * No LDAs found *    Findings: bilateral ext polyps      Electronically signed by Michelle Batres MD on 10/31/2023 at 10:25 AM

## 2023-10-31 NOTE — TELEPHONE ENCOUNTER
Patients would like to know if he should he start the Lovenox again tomorrow. Had the surgery this morning. Call back number [phone]

## 2023-10-31 NOTE — PROGRESS NOTES
Fentanyl repeated for pain , see mar. Nasal packing removed, two from left nare and one from right, small to moderate amounts dark red drainage noted on dressings, Pt states, 'that feels better. \"

## 2023-10-31 NOTE — ANESTHESIA PRE PROCEDURE
Department of Anesthesiology  Preprocedure Note       Name:  Julien Horta   Age:  82 y.o.  :  1940                                          MRN:  [id number]         Date:  10/31/2023      Surgeon: Orville Garcia):  Porsha Mcfarlane MD    Procedure: Procedure(s):  ENDOSCOPIC ETHMOIDECTOMIES MAXILLARY ANSTROSTOMIES NASAL POLYPECTOMY FRONTAL SINUSOTOMY, 1.5 HRS, REP DR. KORI Herr WILL HAVE DISC/ [facility] NOTIFIED    Medications prior to admission:   Prior to Admission medications    Medication Sig Start Date End Date Taking?  Authorizing Provider   Multiple Vitamin (MULTIVITAMIN ADULT) TABS Take 1 tablet by mouth daily    Negrita Buitrago MD   albuterol (PROVENTIL) (2.5 MG/3ML) 0.083% nebulizer solution Inhale 3 mLs into the lungs every 6 hours as needed    Negrita Buitrago MD   albuterol sulfate HFA (PROVENTIL;VENTOLIN;PROAIR) 108 (90 Base) MCG/ACT inhaler Inhale 1 puff into the lungs every 4 hours as needed    Negrita Buitrago MD   apixaban (ELIQUIS) 5 TABS tablet     Negrita Buitrago MD   aspirin 81 MG EC tablet Take 1 tablet by mouth daily    Negrita Buitrago MD   atorvastatin (LIPITOR) 80 MG tablet Take 1 tablet by mouth daily 23   Negrita Buitrago MD   vitamin D3 (CHOLECALCIFEROL) 125 MCG (5000 UT) TABS tablet Take 1 tablet by mouth daily    Negrita Buitrago MD   dutasteride (AVODART) 0.5 MG capsule Take 1 capsule by mouth daily    Negrita Buitrago MD   fluticasone (FLONASE) 50 MCG/ACT nasal spray 1 spray by Nasal route as needed    Negrita Buitrago MD   guaiFENesin (MUCINEX MAXIMUM STRENGTH) 1200 MG TB12 Take 1,200 mg by mouth every 12 hours as needed    Negrita Buitrago MD   loratadine (CLARITIN) 10 MG tablet Take 1 tablet by mouth daily    Negrita Buitrago MD   Omega-3 Fatty Acids (OMEGA-3 PO) Take 1 capsule by mouth daily    Negrita Buitrago MD   pantoprazole (PROTONIX) 40 MG tablet Take 1 tablet by mouth daily 23   Iftikhar

## 2023-10-31 NOTE — ANESTHESIA POSTPROCEDURE EVALUATION
Department of Anesthesiology  Postprocedure Note    Patient: Esther Alexis  MRN: [id number]  YOB: 1940  Date of evaluation: 10/31/2023      Procedure Summary     Date: 10/31/23 Room / Location: [address] [place] [address]    Anesthesia Start: 9596 Anesthesia Stop: 7066    Procedure: ENDOSCOPIC ETHMOIDECTOMIES MAXILLARY ANSTROSTOMIES NASAL POLYPECTOMY FRONTAL SINUSOTOMY (Nose) Diagnosis:       Chronic sinusitis, unspecified location      Nasal polyp      (Chronic sinusitis, unspecified location [J32.9])      (Nasal polyp [J33.9])    Surgeons: Lito Luther MD Responsible Provider: Hoa Sebastian MD    Anesthesia Type: general ASA Status: 3          Anesthesia Type: No value filed.     Alek Phase I:      Alek Phase II:        Anesthesia Post Evaluation    Patient location during evaluation: bedside  Patient participation: complete - patient participated  Level of consciousness: awake  Airway patency: patent  Nausea & Vomiting: no nausea and no vomiting  Complications: no  Cardiovascular status: blood pressure returned to baseline  Respiratory status: acceptable  Hydration status: euvolemic  Pain management: adequate

## 2023-10-31 NOTE — PROGRESS NOTES
Patient stats pain \"is better\" at \"1-2\" on scale, pt states \"I clenched my teeth and used to wear a device for it so sometimes I feel discomfort from clenching. \" No bleeding or drainage noted from nose at this time.  Pt using urinal

## 2023-11-01 ENCOUNTER — TELEPHONE (OUTPATIENT)
Dept: OTOLARYNGOLOGY | Facility: CLINIC | Age: 83
End: 2023-11-01

## 2023-11-01 DIAGNOSIS — J44.9 CHRONIC OBSTRUCTIVE PULMONARY DISEASE, UNSPECIFIED COPD TYPE (MULTI): ICD-10-CM

## 2023-11-01 RX ORDER — BUDESONIDE AND FORMOTEROL FUMARATE DIHYDRATE 80; 4.5 UG/1; UG/1
2 AEROSOL RESPIRATORY (INHALATION) 2 TIMES DAILY
Qty: 10.2 G | Refills: 0 | Status: SHIPPED | OUTPATIENT
Start: 2023-11-01 | End: 2023-12-12 | Stop reason: SDUPTHER

## 2023-11-03 DIAGNOSIS — E78.5 HYPERLIPIDEMIA, UNSPECIFIED HYPERLIPIDEMIA TYPE: ICD-10-CM

## 2023-11-03 RX ORDER — ATORVASTATIN CALCIUM 80 MG/1
80 TABLET, FILM COATED ORAL DAILY
Qty: 30 TABLET | Refills: 0 | Status: SHIPPED | OUTPATIENT
Start: 2023-11-03 | End: 2023-12-06

## 2023-11-06 ENCOUNTER — OFFICE VISIT (OUTPATIENT)
Dept: OTOLARYNGOLOGY | Facility: CLINIC | Age: 83
End: 2023-11-06
Payer: MEDICARE

## 2023-11-06 DIAGNOSIS — J33.9 NASAL POLYPS: Primary | ICD-10-CM

## 2023-11-06 DIAGNOSIS — J32.4 CHRONIC PANSINUSITIS: ICD-10-CM

## 2023-11-06 PROCEDURE — 1159F MED LIST DOCD IN RCRD: CPT | Performed by: OTOLARYNGOLOGY

## 2023-11-06 PROCEDURE — 1036F TOBACCO NON-USER: CPT | Performed by: OTOLARYNGOLOGY

## 2023-11-06 PROCEDURE — 3074F SYST BP LT 130 MM HG: CPT | Performed by: OTOLARYNGOLOGY

## 2023-11-06 PROCEDURE — 3078F DIAST BP <80 MM HG: CPT | Performed by: OTOLARYNGOLOGY

## 2023-11-06 PROCEDURE — 31237 NSL/SINS NDSC SURG BX POLYPC: CPT | Performed by: OTOLARYNGOLOGY

## 2023-11-06 PROCEDURE — 1125F AMNT PAIN NOTED PAIN PRSNT: CPT | Performed by: OTOLARYNGOLOGY

## 2023-11-06 PROCEDURE — 1160F RVW MEDS BY RX/DR IN RCRD: CPT | Performed by: OTOLARYNGOLOGY

## 2023-11-06 RX ORDER — HYDROCODONE BITARTRATE AND ACETAMINOPHEN 5; 325 MG/1; MG/1
1 TABLET ORAL
COMMUNITY
Start: 2023-10-31 | End: 2023-11-07

## 2023-11-06 NOTE — PROGRESS NOTES
The patient returns.  We are seeing him back today status post uneventful bilateral endoscopic sinus surgery for massive polyps etc.  He is doing very well.  We kept him off the CPAP mask and he is having a lot of difficulty with sleeping because of that.  He denies any other worrisome ENT issues.  Remaining inquiry being clear.    Procedure:  After topical anesthesia, patient with bilateral endoscopic debridement with use of the 0 degree 3 mm telescope.  We removed moderate debris from the middle meatus and do note he has bilateral intact propel stents.  These were left alone.  Patient tolerated procedure well.    Assessment and plan:  Doing very well following uneventful endoscopic sinus surgery.  Continue with current irrigation and see him back in 2 weeks.  Contact me sooner with issue.  May resume use of CPAP therapy.  All questions were answered in this regard accordingly.

## 2023-11-07 ENCOUNTER — OFFICE VISIT (OUTPATIENT)
Dept: PRIMARY CARE | Facility: CLINIC | Age: 83
End: 2023-11-07
Payer: MEDICARE

## 2023-11-07 VITALS
TEMPERATURE: 97.3 F | HEIGHT: 72 IN | WEIGHT: 250 LBS | SYSTOLIC BLOOD PRESSURE: 116 MMHG | DIASTOLIC BLOOD PRESSURE: 70 MMHG | HEART RATE: 56 BPM | BODY MASS INDEX: 33.86 KG/M2 | RESPIRATION RATE: 16 BRPM

## 2023-11-07 DIAGNOSIS — I48.0 PAROXYSMAL ATRIAL FIBRILLATION (MULTI): ICD-10-CM

## 2023-11-07 DIAGNOSIS — N40.0 BENIGN PROSTATIC HYPERPLASIA WITHOUT LOWER URINARY TRACT SYMPTOMS: ICD-10-CM

## 2023-11-07 DIAGNOSIS — K21.9 GASTROESOPHAGEAL REFLUX DISEASE WITHOUT ESOPHAGITIS: ICD-10-CM

## 2023-11-07 DIAGNOSIS — E78.2 MIXED HYPERLIPIDEMIA: ICD-10-CM

## 2023-11-07 DIAGNOSIS — G25.0 ESSENTIAL TREMOR: ICD-10-CM

## 2023-11-07 DIAGNOSIS — G25.2 INTENTION TREMOR: ICD-10-CM

## 2023-11-07 DIAGNOSIS — Z00.00 ROUTINE GENERAL MEDICAL EXAMINATION AT HEALTH CARE FACILITY: ICD-10-CM

## 2023-11-07 DIAGNOSIS — I10 PRIMARY HYPERTENSION: Primary | ICD-10-CM

## 2023-11-07 PROCEDURE — 1036F TOBACCO NON-USER: CPT | Performed by: FAMILY MEDICINE

## 2023-11-07 PROCEDURE — 1159F MED LIST DOCD IN RCRD: CPT | Performed by: FAMILY MEDICINE

## 2023-11-07 PROCEDURE — 1160F RVW MEDS BY RX/DR IN RCRD: CPT | Performed by: FAMILY MEDICINE

## 2023-11-07 PROCEDURE — 3078F DIAST BP <80 MM HG: CPT | Performed by: FAMILY MEDICINE

## 2023-11-07 PROCEDURE — 99214 OFFICE O/P EST MOD 30 MIN: CPT | Performed by: FAMILY MEDICINE

## 2023-11-07 PROCEDURE — 1125F AMNT PAIN NOTED PAIN PRSNT: CPT | Performed by: FAMILY MEDICINE

## 2023-11-07 PROCEDURE — 3074F SYST BP LT 130 MM HG: CPT | Performed by: FAMILY MEDICINE

## 2023-11-07 NOTE — PROGRESS NOTES
Juan Carlos Nguyen is a 83 y.o. male here today for 6 month follow up. Pt will notify when refills are needed.   Chief Complaint   Patient presents with    Hyperlipidemia    Hypertension    Difficulty Urinating    Atrial Fibrillation    GERD    Irregular Heart Beat      Patient reports no refills are needed today.     I reviewed his recent labs from August 14.    HPI   HTN recheck -- Patient denies chest pain, SOB, edema, palpitations on review.  Taking medication correctly and denies any side effects.      HLD recheck -- Patient taking medications correctly.  No SE's of muscle pain or joint pain.  No CP, edema, myalgias.    BPH Recheck -- Patient reports BPH symptoms are well controlled with current medications.  No change/worsening in frequency, nocturia.  Urine stream is good with little or no hesitancy.  No SE's from medications.    GERD recheck -- Patient reports GI symptoms are well controlled with current treatment.  No heartburn, chest pain, vomiting, diarrhea.  No SE's from current treatment.  Patient wishes to continue the same treatment.     Recheck tremor -- Still present and moderately well controlled.      Nasal polyp surgery last week and it went well with good recovery.      A-fib - Dr. Meade manages and Dr. Barraza.        Current Outpatient Medications:     albuterol 2.5 mg /3 mL (0.083 %) nebulizer solution, Take 3 mL (2.5 mg) by nebulization every 6 hours if needed for wheezing., Disp: , Rfl:     albuterol 90 mcg/actuation inhaler, Inhale 1 puff every 4 hours if needed for wheezing., Disp: , Rfl:     apixaban (Eliquis) 5 mg tablet, TAKE ONE TABLET BY MOUTH TWO TIMES A DAY, Disp: 180 tablet, Rfl: 3    aspirin 81 mg EC tablet, , Disp: , Rfl:     atorvastatin (Lipitor) 80 mg tablet, Take 1 tablet (80 mg) by mouth once daily., Disp: 30 tablet, Rfl: 0    calcium 500 mg calcium (1,250 mg) tablet, Take 1 tablet (1,250 mg) by mouth once daily., Disp: , Rfl:     cholecalciferol (Vitamin D3) 5,000 Units  tablet, Take by mouth once daily., Disp: , Rfl:     dutasteride (Avodart) 0.5 mg capsule, Take 1 capsule (0.5 mg) by mouth once daily., Disp: , Rfl:     fluticasone (Flonase) 50 mcg/actuation nasal spray, Administer 1 spray into each nostril if needed for rhinitis. Shake gently. Before first use, prime pump. After use, clean tip and replace cap., Disp: , Rfl:     guaiFENesin (Mucinex) 1,200 mg tablet extended release 12hr, Take 1 tablet (1,200 mg) by mouth every 12 hours if needed., Disp: , Rfl:     loratadine (Claritin) 10 mg tablet, Take 1 tablet (10 mg) by mouth once daily., Disp: , Rfl:     omega 3-dha-epa-fish oil (Fish OiL) 1,200 (144-216) mg capsule, Take by mouth., Disp: , Rfl:     pantoprazole (ProtoNix) 40 mg EC tablet, Take 1 tablet (40 mg) by mouth once daily. Do not crush, chew, or split., Disp: 90 tablet, Rfl: 1    propranolol (Inderal) 20 mg tablet, Take 1 tablet (20 mg) by mouth 2 times a day., Disp: 180 tablet, Rfl: 1    sotalol (Betapace) 80 mg tablet, Take 1 tablet (80 mg) by mouth every 12 hours., Disp: 180 tablet, Rfl: 1    Symbicort 80-4.5 mcg/actuation inhaler, INHALE TWO PUFFS BY MOUTH TWO TIMES A DAY. RINSE MOUTH AFTER USE, Disp: 10.2 g, Rfl: 0    terazosin (Hytrin) 10 mg capsule, Take 1 capsule (10 mg) by mouth once daily at bedtime., Disp: 90 capsule, Rfl: 1    terazosin (Hytrin) 5 mg capsule, Take 1 capsule (5 mg) by mouth once daily at bedtime., Disp: 90 capsule, Rfl: 1    ursodiol (Actigall) 300 mg capsule, Take 1 capsule (300 mg) by mouth 2 times a day., Disp: 180 capsule, Rfl: 1    valsartan (Diovan) 80 mg tablet, Take 1 tablet (80 mg) by mouth once daily. for blood pressure, Disp: 90 tablet, Rfl: 1    zinc gluconate 50 mg tablet, Take by mouth., Disp: , Rfl:     Patient Active Problem List   Diagnosis    Abnormal echocardiogram    Actinic keratosis    Allergic rhinitis    Anxiety    Arrhythmia    Ventricular ectopy    Asthma    Basal cell carcinoma    Primary hypertension    BPH  (benign prostatic hyperplasia)    Choledocholithiasis    Chronic obstructive pulmonary disease (CMS/HCC)    Dysuria-frequency syndrome    Essential tremor    Mastodynia    Lung nodules    Lump or mass in breast    Hyperlipidemia    History of ST elevation myocardial infarction (STEMI)    Gastroesophageal reflux disease without esophagitis    Frequent PVCs    MGD (meibomian gland dysfunction)    Mild CAD    Nuclear sclerosis of both eyes    Class 1 obesity    Obstructive sleep apnea    Osteoarthritis of knee    PAC (premature atrial contraction)    Paroxysmal atrial fibrillation (CMS/HCC)    Pulmonary hypertension (CMS/HCC)    PVD (posterior vitreous detachment), both eyes    Recurrent nephrolithiasis    Retained gallstones following laparoscopic cholecystectomy    Seborrheic keratosis, inflamed    Sinus bradycardia    Intention tremor    Trichiasis of right eyelid    Venous lake of lip    COPD (chronic obstructive pulmonary disease) (CMS/HCC)    Bilateral presbyopia    Bilateral sensorineural hearing loss    Nasal polyps    Stroke-like symptoms    Implantable loop recorder present    Localized, primary osteoarthritis    TIA (transient ischemic attack)    Anticoagulant long-term use    Heart attack (CMS/HCC)    Chronic sinusitis         No results found for this or any previous visit (from the past 672 hour(s)).     Objective    Visit Vitals  /70   Pulse 56   Temp 36.3 °C (97.3 °F)   Resp 16   Ht 1.829 m (6')   Wt 113 kg (250 lb)   BMI 33.91 kg/m²     Body mass index is 33.91 kg/m².     Physical Exam   General - Not in acute distress and cooperative.  Build & Nutrition - Well developed  Posture - Normal  Gait - Normal  Mental Status - alert and oriented x 3    Head - Normocephalic    Neck - Thyroid normal size    Eyes - Bilateral - Sclera clear and lids pink without edema or mass.      Skin - Warm and dry with no rashes on visible skin    Lungs - Clear to auscultation and normal breathing  effort    Cardiovascular - RRR and no murmurs, rubs or thrill.    Peripheral Vascular - Bilateral - no edema present    Neuropsychiatric - normal mood and affect        Assessment    1. Primary hypertension     Condition seems well controlled.  No change in current treatment.  Patient reports no refills are needed today.       2. Routine general medical examination at health care facility  1 Year Follow Up In [facility] - PCP - Wellness Exam      3. Benign prostatic hyperplasia without lower urinary tract symptoms     Condition seems well controlled.  No change in current treatment.       4. Essential tremor     Condition seems well controlled.  No change in current treatment.       5. Mixed hyperlipidemia     Condition seems well controlled.  No change in current treatment.       6. Gastroesophageal reflux disease without esophagitis     Condition seems well controlled.  No change in current treatment.       7. Paroxysmal atrial fibrillation (CMS/HCC)     This is stable and managed by his cardiologist and electrophysiologist who he sees at least yearly.     8. Intention tremor     Condition seems well controlled.  No change in current treatment.

## 2023-11-09 ENCOUNTER — PATIENT OUTREACH (OUTPATIENT)
Dept: CARE COORDINATION | Facility: CLINIC | Age: 83
End: 2023-11-09
Payer: MEDICARE

## 2023-11-09 NOTE — PROGRESS NOTES
Call placed regarding one 90 day post discharge follow up call.  At time of outreach call the patient feels as if their condition has improved since initial visit with PCP or specialist.  Pt reports he had nasal polyp surgery with Dr. Chan last week and is recovering well.  Questions or concerns regarding recovery period addressed at this time.   Pt had PCP appt 11/7 and denies any questions. Pt reports he has all of his medications.  Pt denies any questions, needs, or concerns at this time. Pt encouraged to call if questions or needs arise.

## 2023-11-10 NOTE — PROGRESS NOTES
Patient: Juan Carlos Nguyen    [id number]  : 1940 -- AGE 83 y.o.    Provider: SHAHZDA Arzola     Location [facility]   Service Date: 2023              [facility]  New Visit Note      HISTORY OF PRESENT ILLNESS     Juan Carlos Nguyen is a 83 y.o. male who presents to [facility] for a comprehensive sleep medicine evaluation with concerns of  ABE management    The patient has h/o ABE and atrial fibrillation on Eliquis, CAD, MI (no stents), implantable loop recorder, TIA, COPD, essential tremor, BPH, HTN, HLD, former smoking, history of daily ETOH use, chronic sinusitis, nasal polyps s/p recent sinus surgery     Patient reports he was diagnosed with ABE 20 years ago and has been compliant with CPAP since.   Recently underwent bilateral endoscopic sinus surgery with ENT, Dr. Chan for massive polyps and CPAP was held after the procedure causing much difficulty with sleep. Reports he called ENT and begged him to be able to resume PAP use after about 6 days because he was unable to sleep without it.     [place] [facility] [place] [facility]. Reports he thinks his current DME is [employer] but he is not 100% sure. Patient did not bring equipment with him to office visit today but is enrolled in [email or web address] on his phone, patient was able to share his data usage with me for review. Patient states he has been on CPAP 12CWP for many years, reports he is comfortable with current pressure but has had 40lb weight loss in the past 2 years. Using nasal pillow mask with chinstrap but still has air leak. Reports much more refreshing sleep in comparison to before starting APAP. Typical sleep schedule 11:00pm- 6:30am. Wakes refreshed. Swims 3x/week.  Typically naps a few times a week around noon for 60-90 min, naps are refreshing. Denies difficulty falling asleep, staying asleep. Patient denies symptoms of narcolepsy,  cataplexy, RLS, parasomnias, and shift-work disorder.     Went back into afib yesterday per is apple watch. States he is asymptomatic, patient was able to swim this morning.     PAP Adherence:  DURABLE MEDICAL EQUIPMENT COMPANY: [employer]??  Machine: THERAPY: RESMED AIRSENSE 10 PRESSURE SETTINGS: 12 cm H2O  Mask: MASK TYPE: DREAMWEAR SILICONE PILLOW  Issues with therapy: ISSUES WITH THERAPY: reports high leak in the morning  ;   Benefits with PAP: PERCEIVED BENEFITS OF PAP: refreshing sleep reduced daytime sleepiness decreased or no snoring decreased nocturnal awakenings better sleep quality    CPAP Adherence at 12 cm H2O: Between dates 8/15  and 11/12 . Patient usage:  84/90 of nights, for an average of 7hrs  and 8min  per night, with >= 4 hours usage on  93%  of nights. His residual estimated apnea/hypopnea index is 2.02 .  The average large leak is 48.5 L/min .     SLEEP STUDY HISTORY (personally reviewed raw data such as interpretation report, data sheet, hypnogram, and titration table if available and applicable)    Unable to locate past sleep study in [facility], or [facility].       SLEEP ENVIRONMENT  Sleep status: sleeps with bed partner  Preferred sleep position: side  Room is dark: Yes  Room is quiet: Yes  Room is cool: Yes  Bed comfort: good    SLEEP HABITS  Caffeine consumption: Yes, 1 cups/day  Alcohol consumption: Yes, 1 drinks/day  Smoking: No  Marijuana: No  Sleep aids: denies    SLEEP ROS  Sleep Initiation: no problems going to sleep    Sleep Maintenance: wakes up about 2 times per night and easily returns to sleep after awakening  Problems staying asleep associated with: Problems Staying Asleep: nocturia    Breathing during sleep: no symptoms of snoring or concerns of breathing at night with PAP use       Sleep-related behaviors: dreams upon falling asleep  hypnagogic/hypnopompic hallucinations  sleep paralysis  sleep attacks  symptoms of cataplexy  sleep walking  kicking, punching, or acting  out dreams  sleep eating  nightmares    Daytime Symptoms  On awakening patient reports: waking refreshed    Patient denies daytime symptoms including: Denies: excessive daytime sleepiness sleep inertia late to work/school due to sleepiness irritability during the day difficulty with memory or concentration during the day feeling sleepy when driving  Fatigue: denies feeling fatigue    RLS screen: Patient denies RLS symptoms.    WEIGHT: gained 40lbs in 2 year by diet and exercise      Claustrophobia: No     ESS: 8  JAHAIRA: 6  FOSQ: 39    REVIEW OF SYSTEMS     All other systems have been reviewed and are negative.    ALLERGIES     Allergies   Allergen Reactions    Montelukast Other    Finasteride Itching    Iodinated Contrast Media Other    Sulfamethizole Itching    Sulfamethoxazole-Trimethoprim Rash and Other     FACIAL REDNESS AND SWEATING       MEDICATIONS     Current Outpatient Medications   Medication Sig Dispense Refill    albuterol 2.5 mg /3 mL (0.083 %) nebulizer solution Take 3 mL (2.5 mg) by nebulization every 6 hours if needed for wheezing.      albuterol 90 mcg/actuation inhaler Inhale 1 puff every 4 hours if needed for wheezing.      apixaban (Eliquis) 5 mg tablet TAKE ONE TABLET BY MOUTH TWO TIMES A  tablet 3    aspirin 81 mg EC tablet       atorvastatin (Lipitor) 80 mg tablet Take 1 tablet (80 mg) by mouth once daily. 30 tablet 0    calcium 500 mg calcium (1,250 mg) tablet Take 1 tablet (1,250 mg) by mouth once daily.      cholecalciferol (Vitamin D3) 5,000 Units tablet Take by mouth once daily.      dutasteride (Avodart) 0.5 mg capsule Take 1 capsule (0.5 mg) by mouth once daily.      fluticasone (Flonase) 50 mcg/actuation nasal spray Administer 1 spray into each nostril if needed for rhinitis. Shake gently. Before first use, prime pump. After use, clean tip and replace cap.      guaiFENesin (Mucinex) 1,200 mg tablet extended release 12hr Take 1 tablet (1,200 mg) by mouth every 12 hours if needed.       loratadine (Claritin) 10 mg tablet Take 1 tablet (10 mg) by mouth once daily.      omega 3-dha-epa-fish oil (Fish OiL) 1,200 (144-216) mg capsule Take by mouth.      pantoprazole (ProtoNix) 40 mg EC tablet Take 1 tablet (40 mg) by mouth once daily. Do not crush, chew, or split. 90 tablet 1    propranolol (Inderal) 20 mg tablet Take 1 tablet (20 mg) by mouth 2 times a day. 180 tablet 1    sotalol (Betapace) 80 mg tablet Take 1 tablet (80 mg) by mouth every 12 hours. 180 tablet 1    Symbicort 80-4.5 mcg/actuation inhaler INHALE TWO PUFFS BY MOUTH TWO TIMES A DAY. RINSE MOUTH AFTER USE 10.2 g 0    terazosin (Hytrin) 10 mg capsule Take 1 capsule (10 mg) by mouth once daily at bedtime. 90 capsule 1    terazosin (Hytrin) 5 mg capsule Take 1 capsule (5 mg) by mouth once daily at bedtime. 90 capsule 1    ursodiol (Actigall) 300 mg capsule Take 1 capsule (300 mg) by mouth 2 times a day. 180 capsule 1    valsartan (Diovan) 80 mg tablet Take 1 tablet (80 mg) by mouth once daily. for blood pressure 90 tablet 1    zinc gluconate 50 mg tablet Take by mouth.       No current facility-administered medications for this visit.       PAST HISTORIES     PERTINENT PAST MEDICAL HISTORY: See HPI    PERTINENT PAST SURGICAL HISTORY for Sleep Medicine:  sinus surgery      PERTINENT FAMILY HISTORY for Sleep Medicine:  sleep apnea on PAP- brother and sister     PERTINENT SOCIAL HISTORY:  He  reports that he has never smoked. He has never used smokeless tobacco. He reports current alcohol use. He reports that he does not use drugs. He currently lives with a partner or spouse and retired from work. 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 No significant changes unless documented in the pertinent chart section. Updates made when necessary.     PHYSICAL EXAM       VITAL SIGNS: BP (!) 84/48 (BP  "Location: Left arm, Patient Position: Sitting, BP Cuff Size: Large adult)   Pulse 78   Temp 36.7 °C (98.1 °F) (Temporal)   Resp 18   Ht 1.829 m (6')   Wt 114 kg (250 lb 9.6 oz)   SpO2 97%   BMI 33.99 kg/m²      CURRENT WEIGHT:   Vitals:    11/13/23 1003   Weight: 114 kg (250 lb 9.6 oz)     Body mass index is 33.99 kg/m².  PREVIOUS WEIGHTS:  Wt Readings from Last 3 Encounters:   11/13/23 114 kg (250 lb 9.6 oz)   11/07/23 113 kg (250 lb)   10/25/23 115 kg (253 lb)         Physical Exam  Constitutional: Alert and oriented x 3, cooperated, no obvious distress.  HEENT: Not icteric or anemia, EOM WNL, bilaterally  Modified Mallampati class 2-3+  Airway comments: narrow lateral walls  Tongue scalloping: no  Retrognathia: no  Neck: Supple, no JVD, no goiter, no adenopathy, Large neck/Normal neck size  Chest: Clear to auscultation bilaterally, no wheezing, no crackles, no rubs  Cardiac: Irregular rhythm, rate controlled, no murmur, no thrill  Abdomen: Obese, Soft, nontender, no masses, no organomegaly  Extremities: No clubbing, 1+ nonpitting BLE edema  Neuromuscular: Grossly intact. No obvious focal deficit    RESULTS/DATA     No results found for: \"IRON\", \"TRANSFERRIN\", \"IRONSAT\", \"TIBC\", \"FERRITIN\"    Bicarbonate   Date Value Ref Range Status   09/08/2023 30 21 - 32 mmol/L Final       ASSESSMENT/PLAN     Mr. Nguyen is a 83 y.o. male and He was referred to the [facility] for evaluation of ABE    Problem List, Orders, Assessment, Recommendations:  Problem List Items Addressed This Visit             ICD-10-CM       Cardiac and Vasculature     # ATRIAL FIBRILLATION  - Reports recent episode of afib detected on apple watch yesterday, pt. In afib during office visit today, asymptomatic  -encouraged to Follow up with cardiology/EP Dr. Hopkins  - discuss relationship of ABE and Afib in regards to treatment, encouraged nightly use of CPAP as well as this is a long-term treatment, verbalized " understanding  - on meds per Cardio  - Defer management to Cardio          Paroxysmal atrial fibrillation (CMS/[facility]) I48.0       Endocrine/Metabolic    BMI 33.0-33.9,adult Z68.33       Sleep     #ABE  - Obtain sleep study records, last Sleep study completed at [facility]  - Retrieved and personally reviewed recent PAP adherence download data today. See HPI.  - excellent compliance to PAP therapy, residual AHI at goal, and good control of ABE symptoms  - will adjust current setting to 10CWP- patient to contact office with name of [employer] company, unable to make changes in [employer] in office today. Patient called in and said [employer]   - continue NP with chinstrap, may benefit from mask refitting if he becomes more bothered with airleak, but overall asymptotic and not bothered by it outside of getting a frown face on his machine   - renew PAP supply orders, order placed to [employer]  - retesting not clinically indicated at this time, will continue to assess   - diet, exercise, and weight loss were emphasized today in clinic, as were non-supine sleep, avoiding alcohol in the late evening, and driving or operating heavy machinery when sleepy. Patient verbalized understanding.          ABE (obstructive sleep apnea) - Primary G47.33       Tobacco    Former smoker Z87.891     Other Visit Diagnoses         Codes    Obstructive sleep apnea (adult) (pediatric)     G47.33    Relevant Orders    Positive Airway Pressure (PAP) Therapy              All of patient's questions were answered. He verbalizes understanding and agreement with my assessment and plan.    Disposition    Follow up in 6 months

## 2023-11-13 ENCOUNTER — OFFICE VISIT (OUTPATIENT)
Dept: SLEEP MEDICINE | Facility: CLINIC | Age: 83
End: 2023-11-13
Payer: MEDICARE

## 2023-11-13 ENCOUNTER — TELEPHONE (OUTPATIENT)
Dept: PULMONOLOGY | Facility: CLINIC | Age: 83
End: 2023-11-13

## 2023-11-13 VITALS
TEMPERATURE: 98.1 F | HEIGHT: 72 IN | RESPIRATION RATE: 18 BRPM | BODY MASS INDEX: 33.94 KG/M2 | HEART RATE: 78 BPM | SYSTOLIC BLOOD PRESSURE: 84 MMHG | WEIGHT: 250.6 LBS | DIASTOLIC BLOOD PRESSURE: 48 MMHG | OXYGEN SATURATION: 97 %

## 2023-11-13 DIAGNOSIS — G47.33 OBSTRUCTIVE SLEEP APNEA (ADULT) (PEDIATRIC): ICD-10-CM

## 2023-11-13 DIAGNOSIS — I48.0 PAROXYSMAL ATRIAL FIBRILLATION (MULTI): ICD-10-CM

## 2023-11-13 DIAGNOSIS — G47.33 OSA (OBSTRUCTIVE SLEEP APNEA): Primary | ICD-10-CM

## 2023-11-13 DIAGNOSIS — Z87.891 FORMER SMOKER: ICD-10-CM

## 2023-11-13 PROCEDURE — 3074F SYST BP LT 130 MM HG: CPT | Performed by: NURSE PRACTITIONER

## 2023-11-13 PROCEDURE — 1160F RVW MEDS BY RX/DR IN RCRD: CPT | Performed by: NURSE PRACTITIONER

## 2023-11-13 PROCEDURE — 1036F TOBACCO NON-USER: CPT | Performed by: NURSE PRACTITIONER

## 2023-11-13 PROCEDURE — 3078F DIAST BP <80 MM HG: CPT | Performed by: NURSE PRACTITIONER

## 2023-11-13 PROCEDURE — 99204 OFFICE O/P NEW MOD 45 MIN: CPT | Performed by: NURSE PRACTITIONER

## 2023-11-13 PROCEDURE — 1126F AMNT PAIN NOTED NONE PRSNT: CPT | Performed by: NURSE PRACTITIONER

## 2023-11-13 PROCEDURE — 1159F MED LIST DOCD IN RCRD: CPT | Performed by: NURSE PRACTITIONER

## 2023-11-13 ASSESSMENT — SLEEP AND FATIGUE QUESTIONNAIRES
HOW LIKELY ARE YOU TO NOD OFF OR FALL ASLEEP IN A CAR, WHILE STOPPED FOR A FEW MINUTES IN TRAFFIC: WOULD NEVER DOZE
HOW LIKELY ARE YOU TO NOD OFF OR FALL ASLEEP WHEN YOU ARE A PASSENGER IN A CAR FOR AN HOUR WITHOUT A BREAK: WOULD NEVER DOZE
HOW LIKELY ARE YOU TO NOD OFF OR FALL ASLEEP WHILE LYING DOWN TO REST IN THE AFTERNOON WHEN CIRCUMSTANCES PERMIT: HIGH CHANCE OF DOZING
SITING INACTIVE IN A PUBLIC PLACE LIKE A CLASS ROOM OR A MOVIE THEATER: SLIGHT CHANCE OF DOZING
SATISFACTION_WITH_CURRENT_SLEEP_PATTERN: SATISFIED
WORRIED_DISTRESSED_DUE_TO_SLEEP: SOMEWHAT
SLEEP_PROBLEM_NOTICEABLE_TO_OTHERS: SOMEWHAT
SLEEP_PROBLEM_INTERFERES_DAILY_ACTIVITIES: SOMEWHAT
ESS-CHAD TOTAL SCORE: 8
HOW LIKELY ARE YOU TO NOD OFF OR FALL ASLEEP WHILE SITTING AND READING: SLIGHT CHANCE OF DOZING
HOW LIKELY ARE YOU TO NOD OFF OR FALL ASLEEP WHILE SITTING INACTIVE IN A PUBLIC PLACE: WOULD NEVER DOZE
HOW LIKELY ARE YOU TO NOD OFF OR FALL ASLEEP WHILE SITTING QUIETLY AFTER LUNCH WITHOUT ALCOHOL: MODERATE CHANCE OF DOZING
ESS TOTAL SCORE: 7
HOW LIKELY ARE YOU TO NOD OFF OR FALL ASLEEP IN A CAR, WHILE STOPPED FOR A FEW MINUTES IN TRAFFIC: WOULD NEVER DOZE
HOW LIKELY ARE YOU TO NOD OFF OR FALL ASLEEP WHILE SITTING AND TALKING TO SOMEONE: WOULD NEVER DOZE
HOW LIKELY ARE YOU TO NOD OFF OR FALL ASLEEP WHILE WATCHING TV: SLIGHT CHANCE OF DOZING

## 2023-11-13 ASSESSMENT — ENCOUNTER SYMPTOMS
DEPRESSION: 0
OCCASIONAL FEELINGS OF UNSTEADINESS: 0
LOSS OF SENSATION IN FEET: 0

## 2023-11-13 ASSESSMENT — PATIENT HEALTH QUESTIONNAIRE - PHQ9
1. LITTLE INTEREST OR PLEASURE IN DOING THINGS: NOT AT ALL
2. FEELING DOWN, DEPRESSED OR HOPELESS: NOT AT ALL
SUM OF ALL RESPONSES TO PHQ9 QUESTIONS 1 AND 2: 0

## 2023-11-13 ASSESSMENT — COLUMBIA-SUICIDE SEVERITY RATING SCALE - C-SSRS
2. HAVE YOU ACTUALLY HAD ANY THOUGHTS OF KILLING YOURSELF?: NO
6. HAVE YOU EVER DONE ANYTHING, STARTED TO DO ANYTHING, OR PREPARED TO DO ANYTHING TO END YOUR LIFE?: NO
1. IN THE PAST MONTH, HAVE YOU WISHED YOU WERE DEAD OR WISHED YOU COULD GO TO SLEEP AND NOT WAKE UP?: NO

## 2023-11-13 ASSESSMENT — PAIN SCALES - GENERAL: PAINLEVEL: 0-NO PAIN

## 2023-11-13 NOTE — PATIENT INSTRUCTIONS
[facility] Sleep [facility]  [address] [place] [address]       NAME: Juan Carlos Nguyen   DATE: 11/13/23    Your Sleep Provider Today: SHAHZAD Arzola  Your Primary Care Physician: jM Moreira MD       DIAGNOSIS:   1. ABE (obstructive sleep apnea)            Thank you for coming to the Sleep Medicine Clinic today! Your sleep medicine provider today was: SHAHZAD Arzola Below is a summary of your treatment plan, other important information, and our contact numbers:      TREATMENT PLAN       -Please send me a message on [id number] or call my office with your [employer] company. I will then place an order to your company to change pressure to [id number]. Please let me know if you have any issues or discomfort with pressure changes prior to your follow up visit.     Obstructive sleep apnea (ABE): ABE is a sleep disorder where your upper airway muscles relax during sleep and the airway intermittently and repetitively narrows and collapses leading to blocked airway (apnea) which, in turn, can disrupt breathing in sleep, lower oxygen levels while you sleep and cause night time wakings. Because apnea may cause higher carbon dioxide or low oxygen levels, untreated ABE can lead to heart arrhythmia, elevation of blood pressure, and make it harder for the body to consolidate memory and metabolize (leading to higher blood sugars at night).   Frequent partial arousals occur during sleep resulting in sleep deprivation and daytime sleepiness. ABE is associated with an increased risk of cardiovascular disease, stroke, hypertension, and insulin resistance. Moreover, untreated ABE with excessive daytime sleepiness can increase the risk of motor vehicular accidents.    Some conservative strategies for ABE are:   Positional therapy - Avoid sleeping on your back.   Healthy diet, exercise, and optimizing weight encouraged.   Avoid alcohol late in the  evening as it can make sleep apnea worse.     Safety: Avoid driving and operating heavy equipment while sleepy. Drowsy driving may lead to life-threatening motor vehicle accidents.     Common treatment options for sleep apnea include weight management, positional therapy, Positive Airway Therapy (PAP) therapy, oral appliance therapy, hypoglossal nerve stimulation, and select airway surgeries.     Annual Reminders About Your Sleep Apnea    Your sleep apnea is well controlled based on reviewing your PAP Data Report. A supply renewal prescription has been sent to your DME company.     General Reminders:  Continue current machine settings. Continue using machine every night. Need at least 4 hours daily usage.   Remember to clean your mask, tubings, and water chamber regularly as instructed.  Know the replacement schedule of your supplies/ accessories and contact your DME (durable medical equipment) provider if you are due for them.  Avoid driving or operating heavy machinery when drowsy. A person driving while sleepy is 5 times more likely to have an accident. If you feel sleepy, pull over and take a short power nap (sleep for less than 30 minutes). Otherwise, ask somebody to drive you.    [facility] through [facility] or calling our office if you have any of the following symptoms:  Snoring or stopping breathing while using the machine  Recurrence of fatigue, sleepiness, insomnia, and other symptoms you had prior using machine  Persistent or worsening fatigue or sleepiness despite regular use of machine  Issues tolerating the machine like bloating sensation, air hunger, too much hot air, too much pressure, taking off mask without recall in the middle of sleep, etc.     Other conservative measures to improve sleep apnea:  Losing weight can lead to some improvement of ABE which means you will need lower pressures in machine to control your ABE. In some patients, they don't need to use the machine after bariatric  surgery. Hence, consider medical and/or surgical weight loss especially if your BMI is more than 35.  Avoiding alcohol, sedative-hypnotics, and sedating medications is imperative as these substances can worsen snoring and sleep apnea  If you have nasal congestion or seasonal allergies, improving your breathing through the nose is critical for treating sleep apnea, tolerating CPAP, and improving your sleep; hence, using intranasal steroid spray like Flonase might help. Make sure you know the proper way to use it.  Stay off your back when sleeping.    Common issues with PAP machine:  Mask gets dislodged when turning to the side: Consider getting a CPAP pillow or switching to a mask with hose on top.     Dry mouth:  Your machine has built-in humidifier that heats up the air to prevent dry mouth. It can be adjusted to your comfort. You can try that first and increase setting one level one night at a time to check which setting is comfortable and effective in lessening dry mouth. If dry mouth persists despite humidity setting adjustment, may apply OTC Biotene gel over the gums at bedtime.  If Biotene gel is not effective, consider trying XEROSTOM gel from [employer].  Also, eliminate or reduce dose of meds that can cause dry mouth if possible. Lastly, may try getting a separate room humidifier machine.    Airleaks: Please call DME as they may need to adjust your mask or refit you with a different kind of mask. In addition, you can ask DME for tips on getting a good mask seal and mask fit.     Difficulty tolerating the mask: Contact your DME to try a different kind of mask and/or call office to get a referral to Sleep Psychologist for CPAP desensitization. CPAP desensitization technique is a set of strategies that helps patient cope with claustrophobia and anxiety related to wearing mask. Alternatively, we can do a daytime mini-sleep study called PAP-nap trial wherein you will try on different kinds of mask and the sleep  "technician will try different pressure settings on CPAP and BPAP machines to see which specific pressure is tolerable and comfortable for you.     Water droplets or moisture within the hose and/or mask: This is called rain-out and it is caused by condensation of too much heated humidity on the cooler walls of the hose. If you have rain-out, turn down humidity settings or get a heated hose. If you already have a heated hose, turn up the \"tube temperature\" of the heated hose. Alternatively, if you don't want to get a heated hose or warmer air, may wrap the CPAP hose with stockings to keep it somewhat warm. Also, you need to place the machine on the floor and lower the hose so that water won't travel upward towards your mask.       Maintaining your CPAP/BPAP device:    The humidification chamber (aka water tank or water chamber) needs to be filled with distilled water to prevent buildup of white deposits in the future. If you cannot find distilled water, you can use tap water but expect to have white deposits buildup seen after prolonged use with tap water. If you start seeing white deposits on the water chamber, you can clean it by filling it with equal parts of distilled white vinegar and water. Let the vinegar-water mixture sit for 2 hours, and then rinse it with running tap water. Clean with soap and water then let it dry.     You should try to keep your machine clean in order to work well. Here are some tips to clean PAP supplies / accessories:    Clean the humidification chamber (aka water tank) as well as your mask and tubing at least once a week with soap and water.   Alternatively, you can fill a sink or basin with warm water and add a little mild detergent, like Ivory dish soap. Gently wipe your supplies with the soapy water to free all the oils and dirt that may have collected. Once that's done, rinse these items with clean water until the soap is gone and let them air dry. You can hang your tubing over the " curtain rachid in your bathroom so that it dries.  The mask insert (part of the mask that has contact with your skin) needs to be cleaned with soap and water daily. Another option is to wipe them down with CPAP wipes or baby wipes.    You should replace your mask and tubing frequently in order to prevent bacteria buildup, machine damage, and mask seal issues. The older the mask and hose, the high likelihood that there is bacteria buildup in it especially if they are not cleaned regularly. Dirty filters damage machines because build-up of dust and contaminants can cause machine to over-heat, and in time, damage the motor of machine. Cushions lose their seal over time as most masks are made of plastic and silicone while headgear is made of neoprene. These materials will break down with age and frequent use. Here is the recommended replacement schedule for PAP supplies / accessories:    Twice a month- disposable filters and cushions for nasal mask or nasal pillows.  Once a month- cushion for full face mask  Every 3 months- mask with headgear and PAP tubing (standard or heated hose)  Every 6 months- reusable filter, water chamber, and chin strap     Other useful information:    Some people do not put water in the tank while other people prefers to put water in the tank to prevent mouth dryness. Try to experiment to determine which is more comfortable for you.   In general, new machines have 2 years warranty on parts while health insurance allows you to have a new machine once every 5 years.    Follow up in 6 months    IMPORTANT INFORMATION     Call 911 for medical emergencies.  Our offices are generally open from Monday-Friday, 9 am - 5 pm.  If you need to get in touch with me, you may either call me/my team (number is below) or you can use [employer].  If a referral for a test, for CPAP, or for another specialist was made, and you have not heard about scheduling this within a week, please call scheduling at [phone]  [phone]).  If you are unable to make your appointment for clinic or an overnight study, kindly call the office at least 48 hours in advance to cancel and reschedule.  If you are on CPAP, please bring your device's card or the device to each clinic appointment.   There are no supporting services by either the sleep doctors or their staff on weekends and Holidays, or after 5 PM on weekdays.   If you have been asked to come to a sleep study, make sure you bring toiletries, a comfy pillow, and any nighttime medications that you may regularly take. Also be sure to eat dinner before you arrive. We generally do not provide meals.      PRESCRIPTIONS     We require 7 days advanced notice for prescription refills. If we do not receive the request in this time, we cannot guarantee that your medication will be refilled in time.      IMPORTANT PHONE NUMBERS       Appointments (for Adult Sleep Clinic): [phone] - option 2  Appointments (For Sleep Studies): [phone] - option 3  Behavioral Sleep Medicine: [phone]  Sleep Surgery: [phone]  ENT (Otolaryngology): [phone]  Headache Clinic (Neurology): [phone]  Neurology: [phone]  Psychiatry: [phone]  Pulmonary Function Testing (PFT) Center: [phone]  Pulmonary Medicine: [phone]  [employer] (DME): [phone]  [employer]): [phone]  [facility] (DME): [phone]        CONTACTING YOUR SLEEP MEDICINE PROVIDER AND SLEEP TEAM      For issues with your machine or mask interface, please call your DME provider first. DME stands for durable medical company. DME is the company who provides you the machine and/or PAP supplies / accessories.   To schedule, cancel, or reschedule SLEEP STUDY APPOINTMENTS, please call the Main Phone Line at [phone] - option 3.   To schedule, cancel, or reschedule CLINIC APPOINTMENTS, you can do it in \"MyChart\", call [phone] for [facility] office , [phone] [phone] for [address] office to speak with my on site staff, or call the Main Phone Line at [phone] - option 2  For CLINICAL QUESTIONS or MEDICATION REFILLS, please call direct line for Adult Sleep Nurses at [phone].   Lastly, you can also send a message directly to your provider through \"My Chart\", which is the email service through your  Records Account: [email or web address]     Adult Sleep Nurses (Sumi Looney, RN and Carol Camara RN):  For clinical questions and refilling prescriptions: [phone]  Email sleep diaries and other documents at: [email or web address]    Office locations for Xochitl Fontanez NP:    [address]   [address]  [place] [address] [phone] [address]  [address]  [place] [address] [phone]      OUR SLEEP TESTING LOCATIONS     Our team will contact you to schedule your sleep study, however, you can contact us as follow:  Main Phone Line (scheduling only): [phone]), option 3    Sleep Testing Locations:   [place] (18 years and older): [address] [place] [address], 2nd floor   [place] (18 years and older): [address] [place] [address]; 4th floor  After hours line: [phone]   [place] (18 years and older) at [place]: [address] [place] [address]  After hours line: [phone]   [place] [facility] [place] at [facility] (Main campus: All ages): [facility], 6th floor. After hours line: [phone]   [place] (5 years and older; younger considered on case-by-case basis): [address] [place] [address]; [employer] [address], [address]. Scheduling  After hours line: [phone]       Here at [facility], we wish you a restful sleep!    Your sleep medicine provider for this visit was: Xochitl Fontanez, APRN-CNP   "

## 2023-11-13 NOTE — TELEPHONE ENCOUNTER
Patient called in.    Wanted to let Xochitl Fontanez CNP, know that his DME is called:    [employer].    Phone number is  - [phone].

## 2023-11-13 NOTE — ASSESSMENT & PLAN NOTE
# ATRIAL FIBRILLATION  - Reports recent episode of afib detected on apple watch yesterday, pt. In afib during office visit today, asymptomatic  -encouraged to Follow up with cardiology/EP Dr. Hopkins  - discuss relationship of ABE and Afib in regards to treatment, encouraged nightly use of CPAP as well as this is a long-term treatment, verbalized understanding  - on meds per Cardio  - Defer management to Cardio

## 2023-11-13 NOTE — ASSESSMENT & PLAN NOTE
#ABE  - Obtain sleep study records, last Sleep study completed at [facility]  - Retrieved and personally reviewed recent PAP adherence download data today. See HPI.  - excellent compliance to PAP therapy, residual AHI at goal, and good control of ABE symptoms  - will adjust current setting to 10CWP- patient to contact office with name of DME company, unable to make changes in [place] in office today. Patient called in and said DME- [employer]   - continue NP with chinstrap, may benefit from mask refitting if he becomes more bothered with airleak, but overall asymptotic and not bothered by it outside of getting a frown face on his machine   - renew PAP supply orders, order placed to [employer]  - retesting not clinically indicated at this time, will continue to assess   - diet, exercise, and weight loss were emphasized today in clinic, as were non-supine sleep, avoiding alcohol in the late evening, and driving or operating heavy machinery when sleepy. Patient verbalized understanding.

## 2023-11-13 NOTE — TELEPHONE ENCOUNTER
Spoke with pt. Aware of msg below.          Xochitl Fontanez, APRN-CNP  You1 hour ago (2:37 PM)       Thank you. I placed orders to [employer] to change his pressure. Please inform patient.

## 2023-11-17 ENCOUNTER — HOSPITAL ENCOUNTER (OUTPATIENT)
Dept: CARDIOLOGY | Facility: HOSPITAL | Age: 83
Discharge: HOME | End: 2023-11-17
Payer: MEDICARE

## 2023-11-17 DIAGNOSIS — I48.0 PAROXYSMAL ATRIAL FIBRILLATION (MULTI): ICD-10-CM

## 2023-11-17 DIAGNOSIS — Z95.818 PRESENCE OF OTHER CARDIAC IMPLANTS AND GRAFTS: ICD-10-CM

## 2023-11-20 ENCOUNTER — OFFICE VISIT (OUTPATIENT)
Dept: OTOLARYNGOLOGY | Facility: CLINIC | Age: 83
End: 2023-11-20
Payer: MEDICARE

## 2023-11-20 DIAGNOSIS — J33.9 NASAL POLYPS: Primary | ICD-10-CM

## 2023-11-20 DIAGNOSIS — J32.4 CHRONIC PANSINUSITIS: ICD-10-CM

## 2023-11-20 PROCEDURE — 1159F MED LIST DOCD IN RCRD: CPT | Performed by: OTOLARYNGOLOGY

## 2023-11-20 PROCEDURE — 1126F AMNT PAIN NOTED NONE PRSNT: CPT | Performed by: OTOLARYNGOLOGY

## 2023-11-20 PROCEDURE — 1036F TOBACCO NON-USER: CPT | Performed by: OTOLARYNGOLOGY

## 2023-11-20 PROCEDURE — 1160F RVW MEDS BY RX/DR IN RCRD: CPT | Performed by: OTOLARYNGOLOGY

## 2023-11-20 PROCEDURE — 31237 NSL/SINS NDSC SURG BX POLYPC: CPT | Performed by: OTOLARYNGOLOGY

## 2023-11-20 NOTE — PROGRESS NOTES
The patient returns.  We are seeing him back today following uneventful bilateral endoscopic sinus surgery for chronic inflammation with polyps etc.  He is doing very well.  Here for postop debridement.  No postop issue.    There have been no significant changes in past medical or past surgical histories except as mentioned.    Procedure:  After topical anesthesia patient underwent bilateral endoscopic debridement utilizing the 3 mm 0 degree blade as well as suction and Blakesley like type instrumentation.  I did remove is completely and safely as possible all remaining fibrillar type material.  I also removed the portions of residual propel stenting material as well.  Patient tolerated this well bilaterally.    Impression and plan:  Status post bilateral endoscopic sinus surgery as described.  Continue with use of nasal steroid and saline sprays.  Recheck with me in 1 month, sooner with issue.  All questions were answered in this regard accordingly.

## 2023-11-22 ENCOUNTER — HOSPITAL ENCOUNTER (OUTPATIENT)
Dept: CARDIOLOGY | Facility: HOSPITAL | Age: 83
Discharge: HOME | End: 2023-11-22
Payer: MEDICARE

## 2023-11-22 DIAGNOSIS — Z95.818 PRESENCE OF OTHER CARDIAC IMPLANTS AND GRAFTS: ICD-10-CM

## 2023-11-22 DIAGNOSIS — I48.0 PAROXYSMAL ATRIAL FIBRILLATION (MULTI): ICD-10-CM

## 2023-11-22 PROCEDURE — 93298 REM INTERROG DEV EVAL SCRMS: CPT | Performed by: INTERNAL MEDICINE

## 2023-12-05 PROBLEM — R93.1 ABNORMAL ECHOCARDIOGRAM: Status: RESOLVED | Noted: 2023-05-04 | Resolved: 2023-12-05

## 2023-12-05 PROBLEM — I21.9 HEART ATTACK (MULTI): Status: RESOLVED | Noted: 2023-10-24 | Resolved: 2023-12-05

## 2023-12-05 PROBLEM — I49.9 ARRHYTHMIA: Status: RESOLVED | Noted: 2023-05-04 | Resolved: 2023-12-05

## 2023-12-05 NOTE — PROGRESS NOTES
Chief Complaint:   No chief complaint on file.     History Of Present Illness:    Juan Carlos Nguyen is a 83 y.o. male with a history of paroxysmal atrial fibrillation (s/p loop recorder), nonobstructive CAD, hypertension, dyslipidemia, and stroke here for establishment of cardiovascular care.    Has been doing well. Swims 2-3 miles at a time without shortness of breath.  Does notice that if he walks longer distances he does get short of breath however.  This has been going on for several years.  He denies any exertional chest pain.  Denies any palpitations.  If it was not for his Apple Watch he would not know whether he was in or out of atrial fibrillation.    He does have chronic leg swelling.  Has not worn compression socks in the past.    Echocardiogram 8/15/2023: EF 60 to 65%.  Moderate left atrial enlargement.  Mild to moderate MR.  Mild TR.  RVSP 40 mmHg.    Catheterization 7/25/2020: Mild disease of the LAD.  LCx codominant with mild disease.  Codominant RCA with mild disease.  EF 60%.  PA pressure 30 mmHg.  LVEDP 6 mmHg.  Wedge 13 mmHg.  Cardiac output and index 7.104.3     Past Medical History:  He has a past medical history of Acute conjunctivitis (05/04/2023), Acute cystitis with hematuria (05/04/2023), Acute sinusitis (05/04/2023), Acute suppurative otitis media of right ear without spontaneous rupture of tympanic membrane (05/04/2023), Age-related nuclear cataract, unspecified eye (12/13/2016), Benign prostatic hyperplasia without lower urinary tract symptoms (10/14/2014), Body mass index (BMI) 34.0-34.9, adult (12/15/2021), Body mass index (BMI) 35.0-35.9, adult (10/11/2021), Chest discomfort (05/04/2023), Chronic cough (05/04/2023), Encounter for screening for malignant neoplasm of prostate (04/12/2021), Exertional dyspnea (05/04/2023), Frequency of urination (05/04/2023), Gross hematuria (05/04/2023), Hematuria (05/04/2023), Long term (current) use of anticoagulants (09/12/2019), Nasal congestion  (05/04/2023), Nausea (07/21/2020), Personal history of colonic polyps, Personal history of other diseases of the musculoskeletal system and connective tissue, Personal history of other diseases of urinary system (07/21/2020), Persons encountering health services in other specified circumstances (10/11/2021), Postsurgical malabsorption, not elsewhere classified, Presbyopia (12/13/2016), Sprain of ankle (11/05/2013), Tibialis tendinitis (04/03/2014), Tremor (05/04/2023), Unspecified astigmatism, bilateral (12/14/2021), Unspecified astigmatism, bilateral (12/13/2016), and Wheeze (05/04/2023).    Past Surgical History:  He has a past surgical history that includes Other surgical history (04/30/2014); Other surgical history (04/11/2022); Other surgical history (10/11/2021); Other surgical history (10/11/2021); Other surgical history (10/11/2021); Tonsillectomy (06/07/2017); Other surgical history (06/07/2017); Other surgical history (04/11/2022); Other surgical history (04/11/2022); Cholecystectomy (10/14/2014); MR angio head wo IV contrast (8/14/2023); and MR angio neck wo IV contrast (8/14/2023).      Social History:  He reports that he has never smoked. He has never used smokeless tobacco. He reports current alcohol use. He reports that he does not use drugs.    Family History:  Family History   Problem Relation Name Age of Onset    Diabetes Mother      Cancer Mother      Breast cancer Mother      Diabetes Father      Cancer Father      Prostate cancer Father      Cancer Sister      Breast cancer Sister      Other (oral cancer) Brother      Cancer Daughter      Breast cancer Daughter      Other (breast cancer in female) Other          Allergies:  Montelukast, Finasteride, Iodinated contrast media, Sulfamethizole, and Sulfamethoxazole-trimethoprim    Outpatient Medications:  Current Outpatient Medications   Medication Instructions    albuterol 90 mcg/actuation inhaler 1 puff, inhalation, Every 4 hours PRN    albuterol  2.5 mg, nebulization, Every 6 hours PRN    apixaban (Eliquis) 5 mg tablet TAKE ONE TABLET BY MOUTH TWO TIMES A DAY    aspirin 81 mg EC tablet     atorvastatin (LIPITOR) 80 mg, oral, Daily    calcium 500 mg calcium (1,250 mg) tablet 1 tablet, oral, Daily    cholecalciferol (Vitamin D3) 5,000 Units tablet oral, Daily    dutasteride (AVODART) 0.5 mg, oral, Daily    loratadine (CLARITIN) 10 mg, oral, Daily    Mucinex 1,200 mg, oral, Every 12 hours PRN    omega 3-dha-epa-fish oil (Fish OiL) 1,200 (144-216) mg capsule oral    pantoprazole (PROTONIX) 40 mg, oral, Daily, Do not crush, chew, or split.    propranolol (INDERAL) 20 mg, oral, 2 times daily    sotalol (BETAPACE) 80 mg, oral, Every 12 hours    Symbicort 80-4.5 mcg/actuation inhaler 2 puffs, inhalation, 2 times daily, RINSE MOUTH AFTER USE    terazosin (HYTRIN) 10 mg, oral, Nightly    terazosin (HYTRIN) 5 mg, oral, Nightly    ursodiol (ACTIGALL) 300 mg, oral, 2 times daily    valsartan (DIOVAN) 80 mg, oral, Daily, for blood pressure    zinc gluconate 50 mg tablet oral       Last Recorded Vitals:  Visit Vitals  /70 (BP Location: Right arm, Patient Position: Sitting)   Pulse 54   Ht 1.829 m (6')   Wt 113 kg (250 lb)   SpO2 97%   BMI 33.91 kg/m²   Smoking Status Never   BSA 2.4 m²      LASTWT(3):   Wt Readings from Last 3 Encounters:   12/07/23 113 kg (250 lb)   11/13/23 114 kg (250 lb 9.6 oz)   11/07/23 113 kg (250 lb)       Physical Exam:  In general: alert and in no acute distress.   HEENT: Carotid upstrokes normal with no bruits. JVP is normal.   Pulmonary: Clear to auscultation bilaterally.  Cardiovascular: S1,S2, regular. No appreciable murmurs, rubs or gallops.   Lower extremities: Warm. 2+ distal pulses.  1+ bilateral lower extremity edema.     Last Labs:  CBC -  Recent Labs     09/08/23  1607 08/15/23  0736 08/14/23  1252   WBC 5.3 5.5 7.6   HGB 12.5* 12.4* 12.2*   HCT 39.1* 39.0* 37.7*   * 164 187   MCV 95 95 93       CMP -  Recent Labs      09/08/23  1607 08/15/23  0736 08/14/23  1252 08/04/20  0014 07/23/20  0450    140 140   < > 143   K 4.2 4.2 3.9   < > 3.8    106 105   < > 106   CO2 30 27 27   < > 27   ANIONGAP 12 11 12   < > 14   BUN 17 17 19   < > 21   CREATININE 1.02 0.89 1.15   < > 0.91   MG  --  1.87 1.82  --  2.10    < > = values in this interval not displayed.     Recent Labs     08/15/23  0736 08/14/23  1252 08/06/23  0941 04/27/23  1125   ALBUMIN 3.6 3.9 3.5 3.9   ALKPHOS  --  51 45 42   ALT  --  14 12 14   AST  --  15 15 18   BILITOT  --  0.6 1.3* 1.0       LIPID PANEL -   Recent Labs     08/14/23  1252 04/27/23  1125 11/10/22  0908   CHOL 101 117 107   LDLF 53 54 46   HDL 37.5* 54.8 52.0   TRIG 55 43 47       Recent Labs     08/14/23  1252 07/22/20  0546 07/21/20  1718   BNP  --   --  905*   HGBA1C 5.8* 5.5  --            Assessment/Plan   1) paroxysmal atrial fibrillation: History of stroke.  Anticoagulated with Eliquis.  Asymptomatic.  Continue to follow with EP.    2) CAD: Mild disease.  Believe that his dyspnea on exertion with walking is likely deconditioning as he is able to swim 2 to 3 miles without dyspnea.  No additional workup needed at this time.    3) dyslipidemia: LDL well-controlled.    4) hypertension: Blood pressure well-controlled.  No changes needed.    5) edema: Advised trying compression stockings (over-the-counter).    6) follow-up: Would like to see him once a year.  Would like to see him between his 6-month appointments with his electrophysiologist.  Asked him to follow-up in July.  At that time we can switch to annual follow-ups.      Tomas Barraza MD

## 2023-12-06 DIAGNOSIS — E78.5 HYPERLIPIDEMIA, UNSPECIFIED HYPERLIPIDEMIA TYPE: ICD-10-CM

## 2023-12-06 RX ORDER — ATORVASTATIN CALCIUM 80 MG/1
80 TABLET, FILM COATED ORAL DAILY
Qty: 90 TABLET | Refills: 1 | Status: SHIPPED | OUTPATIENT
Start: 2023-12-06 | End: 2024-06-06

## 2023-12-07 ENCOUNTER — OFFICE VISIT (OUTPATIENT)
Dept: CARDIOLOGY | Facility: CLINIC | Age: 83
End: 2023-12-07
Payer: MEDICARE

## 2023-12-07 VITALS
HEIGHT: 72 IN | SYSTOLIC BLOOD PRESSURE: 138 MMHG | BODY MASS INDEX: 33.86 KG/M2 | OXYGEN SATURATION: 97 % | WEIGHT: 250 LBS | DIASTOLIC BLOOD PRESSURE: 70 MMHG | HEART RATE: 54 BPM

## 2023-12-07 DIAGNOSIS — I48.0 PAROXYSMAL ATRIAL FIBRILLATION (MULTI): ICD-10-CM

## 2023-12-07 DIAGNOSIS — I25.10 MILD CAD: Primary | ICD-10-CM

## 2023-12-07 DIAGNOSIS — E78.2 MIXED HYPERLIPIDEMIA: ICD-10-CM

## 2023-12-07 DIAGNOSIS — I10 PRIMARY HYPERTENSION: ICD-10-CM

## 2023-12-07 PROCEDURE — 1160F RVW MEDS BY RX/DR IN RCRD: CPT | Performed by: INTERNAL MEDICINE

## 2023-12-07 PROCEDURE — 1126F AMNT PAIN NOTED NONE PRSNT: CPT | Performed by: INTERNAL MEDICINE

## 2023-12-07 PROCEDURE — 1159F MED LIST DOCD IN RCRD: CPT | Performed by: INTERNAL MEDICINE

## 2023-12-07 PROCEDURE — 1036F TOBACCO NON-USER: CPT | Performed by: INTERNAL MEDICINE

## 2023-12-07 PROCEDURE — 3075F SYST BP GE 130 - 139MM HG: CPT | Performed by: INTERNAL MEDICINE

## 2023-12-07 PROCEDURE — 3078F DIAST BP <80 MM HG: CPT | Performed by: INTERNAL MEDICINE

## 2023-12-07 PROCEDURE — 99214 OFFICE O/P EST MOD 30 MIN: CPT | Performed by: INTERNAL MEDICINE

## 2023-12-07 ASSESSMENT — ENCOUNTER SYMPTOMS
OCCASIONAL FEELINGS OF UNSTEADINESS: 0
LOSS OF SENSATION IN FEET: 0
DEPRESSION: 0

## 2023-12-07 ASSESSMENT — PAIN SCALES - GENERAL: PAINLEVEL: 0-NO PAIN

## 2023-12-12 DIAGNOSIS — J44.9 CHRONIC OBSTRUCTIVE PULMONARY DISEASE, UNSPECIFIED COPD TYPE (MULTI): ICD-10-CM

## 2023-12-13 RX ORDER — BUDESONIDE AND FORMOTEROL FUMARATE DIHYDRATE 160; 4.5 UG/1; UG/1
2 AEROSOL RESPIRATORY (INHALATION) 2 TIMES DAILY
Qty: 3 EACH | Refills: 3 | Status: SHIPPED | OUTPATIENT
Start: 2023-12-13 | End: 2024-05-08 | Stop reason: ALTCHOICE

## 2023-12-22 ENCOUNTER — HOSPITAL ENCOUNTER (OUTPATIENT)
Dept: CARDIOLOGY | Facility: HOSPITAL | Age: 83
Discharge: HOME | End: 2023-12-22
Payer: MEDICARE

## 2023-12-22 ENCOUNTER — OFFICE VISIT (OUTPATIENT)
Dept: OTOLARYNGOLOGY | Facility: CLINIC | Age: 83
End: 2023-12-22
Payer: MEDICARE

## 2023-12-22 DIAGNOSIS — I48.0 PAROXYSMAL ATRIAL FIBRILLATION (MULTI): ICD-10-CM

## 2023-12-22 DIAGNOSIS — J32.4 CHRONIC PANSINUSITIS: Primary | ICD-10-CM

## 2023-12-22 DIAGNOSIS — Z95.818 PRESENCE OF OTHER CARDIAC IMPLANTS AND GRAFTS: ICD-10-CM

## 2023-12-22 PROCEDURE — 99212 OFFICE O/P EST SF 10 MIN: CPT | Performed by: OTOLARYNGOLOGY

## 2023-12-22 PROCEDURE — 1126F AMNT PAIN NOTED NONE PRSNT: CPT | Performed by: OTOLARYNGOLOGY

## 2023-12-22 PROCEDURE — 1159F MED LIST DOCD IN RCRD: CPT | Performed by: OTOLARYNGOLOGY

## 2023-12-22 PROCEDURE — 1160F RVW MEDS BY RX/DR IN RCRD: CPT | Performed by: OTOLARYNGOLOGY

## 2023-12-22 PROCEDURE — 1036F TOBACCO NON-USER: CPT | Performed by: OTOLARYNGOLOGY

## 2023-12-22 NOTE — PROGRESS NOTES
The patient returns.  Seeing him back today following uneventful endoscopic sinus surgery.  This is his 1 month visit.  He is doing great.  Breathing is excellent.  Using the nasal steroid spray daily.  There have been no significant changes in past medical or past surgical histories except as mentioned.    Exam:  Exam is confined to the nose which looks excellent.  He has an excellent entry to the middle meatal regions bilaterally.  We see nothing worrisome.    Assessment and plan:  Doing great following uneventful bilateral endoscopic sinus surgery.  Continue with nasal steroid spray.  See me 6 months, sooner with issue.  All questions were answered in this regard accordingly.

## 2024-01-04 ENCOUNTER — TELEPHONE (OUTPATIENT)
Dept: PULMONOLOGY | Facility: CLINIC | Age: 84
End: 2024-01-04
Payer: MEDICARE

## 2024-01-04 DIAGNOSIS — J44.9 CHRONIC OBSTRUCTIVE PULMONARY DISEASE, UNSPECIFIED COPD TYPE (MULTI): Primary | ICD-10-CM

## 2024-01-04 RX ORDER — BUDESONIDE AND FORMOTEROL FUMARATE DIHYDRATE 80; 4.5 UG/1; UG/1
2 AEROSOL RESPIRATORY (INHALATION)
Qty: 30 EACH | Refills: 6 | Status: SHIPPED | OUTPATIENT
Start: 2024-01-04 | End: 2024-02-03

## 2024-01-04 NOTE — TELEPHONE ENCOUNTER
Patient wants to know why you increased his Symbicort dosage? It was 80 now 160. Can you clarify or was this an error?

## 2024-01-19 ENCOUNTER — HOSPITAL ENCOUNTER (OUTPATIENT)
Dept: CARDIOLOGY | Facility: HOSPITAL | Age: 84
Discharge: HOME | End: 2024-01-19
Payer: MEDICARE

## 2024-01-19 DIAGNOSIS — I48.0 PAROXYSMAL ATRIAL FIBRILLATION (MULTI): ICD-10-CM

## 2024-01-19 DIAGNOSIS — Z95.818 PRESENCE OF OTHER CARDIAC IMPLANTS AND GRAFTS: ICD-10-CM

## 2024-01-19 PROCEDURE — 93297 REM INTERROG DEV EVAL ICPMS: CPT

## 2024-01-19 PROCEDURE — 93298 REM INTERROG DEV EVAL SCRMS: CPT | Performed by: INTERNAL MEDICINE

## 2024-02-19 DIAGNOSIS — I48.0 PAROXYSMAL ATRIAL FIBRILLATION (MULTI): ICD-10-CM

## 2024-02-19 DIAGNOSIS — N34.3 DYSURIA-FREQUENCY SYNDROME: ICD-10-CM

## 2024-02-19 DIAGNOSIS — K21.9 GASTROESOPHAGEAL REFLUX DISEASE WITHOUT ESOPHAGITIS: ICD-10-CM

## 2024-02-19 RX ORDER — SOTALOL HYDROCHLORIDE 80 MG/1
80 TABLET ORAL EVERY 12 HOURS
Qty: 180 TABLET | Refills: 0 | Status: SHIPPED | OUTPATIENT
Start: 2024-02-19 | End: 2024-05-08 | Stop reason: SDUPTHER

## 2024-02-19 RX ORDER — URSODIOL 300 MG/1
300 CAPSULE ORAL 2 TIMES DAILY
Qty: 180 CAPSULE | Refills: 0 | Status: SHIPPED | OUTPATIENT
Start: 2024-02-19 | End: 2024-05-20

## 2024-02-19 RX ORDER — PANTOPRAZOLE SODIUM 40 MG/1
40 TABLET, DELAYED RELEASE ORAL DAILY
Qty: 90 TABLET | Refills: 0 | Status: SHIPPED | OUTPATIENT
Start: 2024-02-19 | End: 2024-02-22

## 2024-02-21 DIAGNOSIS — K21.9 GASTROESOPHAGEAL REFLUX DISEASE WITHOUT ESOPHAGITIS: ICD-10-CM

## 2024-02-22 RX ORDER — PANTOPRAZOLE SODIUM 40 MG/1
40 TABLET, DELAYED RELEASE ORAL DAILY
Qty: 90 TABLET | Refills: 0 | Status: SHIPPED | OUTPATIENT
Start: 2024-02-22 | End: 2024-05-08 | Stop reason: SDUPTHER

## 2024-02-23 ENCOUNTER — HOSPITAL ENCOUNTER (OUTPATIENT)
Dept: CARDIOLOGY | Facility: HOSPITAL | Age: 84
Discharge: HOME | End: 2024-02-23
Payer: MEDICARE

## 2024-02-23 DIAGNOSIS — I48.0 PAROXYSMAL ATRIAL FIBRILLATION (MULTI): ICD-10-CM

## 2024-02-23 DIAGNOSIS — Z95.818 PRESENCE OF OTHER CARDIAC IMPLANTS AND GRAFTS: ICD-10-CM

## 2024-02-23 PROCEDURE — 93298 REM INTERROG DEV EVAL SCRMS: CPT | Performed by: INTERNAL MEDICINE

## 2024-02-23 PROCEDURE — 93298 REM INTERROG DEV EVAL SCRMS: CPT

## 2024-03-04 ENCOUNTER — HOSPITAL ENCOUNTER (OUTPATIENT)
Dept: RADIOLOGY | Facility: HOSPITAL | Age: 84
Discharge: HOME | End: 2024-03-04
Payer: MEDICARE

## 2024-03-04 DIAGNOSIS — D17.71 BENIGN LIPOMATOUS NEOPLASM OF KIDNEY: ICD-10-CM

## 2024-03-04 PROCEDURE — 76770 US EXAM ABDO BACK WALL COMP: CPT

## 2024-03-04 PROCEDURE — 76770 US EXAM ABDO BACK WALL COMP: CPT | Performed by: RADIOLOGY

## 2024-03-08 DIAGNOSIS — I27.20 PULMONARY HYPERTENSION (MULTI): ICD-10-CM

## 2024-03-08 RX ORDER — TERAZOSIN 5 MG/1
5 CAPSULE ORAL NIGHTLY
Qty: 30 CAPSULE | Refills: 5 | Status: SHIPPED | OUTPATIENT
Start: 2024-03-08 | End: 2024-05-08 | Stop reason: SDUPTHER

## 2024-03-29 ENCOUNTER — HOSPITAL ENCOUNTER (OUTPATIENT)
Dept: CARDIOLOGY | Facility: HOSPITAL | Age: 84
Discharge: HOME | End: 2024-03-29
Payer: MEDICARE

## 2024-03-29 DIAGNOSIS — Z95.818 PRESENCE OF OTHER CARDIAC IMPLANTS AND GRAFTS: ICD-10-CM

## 2024-03-29 DIAGNOSIS — I48.0 PAROXYSMAL ATRIAL FIBRILLATION (MULTI): ICD-10-CM

## 2024-03-29 PROCEDURE — 93298 REM INTERROG DEV EVAL SCRMS: CPT | Performed by: INTERNAL MEDICINE

## 2024-03-29 PROCEDURE — 93298 REM INTERROG DEV EVAL SCRMS: CPT

## 2024-04-15 ENCOUNTER — ANCILLARY PROCEDURE (OUTPATIENT)
Dept: CARDIOLOGY | Facility: CLINIC | Age: 84
End: 2024-04-15
Payer: MEDICARE

## 2024-04-15 ENCOUNTER — OFFICE VISIT (OUTPATIENT)
Dept: CARDIOLOGY | Facility: CLINIC | Age: 84
End: 2024-04-15
Payer: MEDICARE

## 2024-04-15 VITALS
WEIGHT: 253 LBS | HEIGHT: 72 IN | DIASTOLIC BLOOD PRESSURE: 68 MMHG | HEART RATE: 75 BPM | SYSTOLIC BLOOD PRESSURE: 128 MMHG | BODY MASS INDEX: 34.27 KG/M2

## 2024-04-15 DIAGNOSIS — Z95.818 PRESENCE OF OTHER CARDIAC IMPLANTS AND GRAFTS: ICD-10-CM

## 2024-04-15 DIAGNOSIS — I25.2 HISTORY OF ST ELEVATION MYOCARDIAL INFARCTION (STEMI): ICD-10-CM

## 2024-04-15 DIAGNOSIS — I48.0 PAROXYSMAL ATRIAL FIBRILLATION (MULTI): ICD-10-CM

## 2024-04-15 DIAGNOSIS — Z95.818 IMPLANTABLE LOOP RECORDER PRESENT: ICD-10-CM

## 2024-04-15 PROCEDURE — 3078F DIAST BP <80 MM HG: CPT | Performed by: INTERNAL MEDICINE

## 2024-04-15 PROCEDURE — 99214 OFFICE O/P EST MOD 30 MIN: CPT | Performed by: INTERNAL MEDICINE

## 2024-04-15 PROCEDURE — 1036F TOBACCO NON-USER: CPT | Performed by: INTERNAL MEDICINE

## 2024-04-15 PROCEDURE — 1159F MED LIST DOCD IN RCRD: CPT | Performed by: INTERNAL MEDICINE

## 2024-04-15 PROCEDURE — 3074F SYST BP LT 130 MM HG: CPT | Performed by: INTERNAL MEDICINE

## 2024-04-15 PROCEDURE — 93000 ELECTROCARDIOGRAM COMPLETE: CPT | Performed by: INTERNAL MEDICINE

## 2024-04-15 PROCEDURE — 93291 INTERROG DEV EVAL SCRMS IP: CPT | Performed by: INTERNAL MEDICINE

## 2024-04-15 ASSESSMENT — PATIENT HEALTH QUESTIONNAIRE - PHQ9
SUM OF ALL RESPONSES TO PHQ9 QUESTIONS 1 AND 2: 0
2. FEELING DOWN, DEPRESSED OR HOPELESS: NOT AT ALL
1. LITTLE INTEREST OR PLEASURE IN DOING THINGS: NOT AT ALL

## 2024-04-15 ASSESSMENT — ENCOUNTER SYMPTOMS
CONSTITUTIONAL NEGATIVE: 1
CARDIOVASCULAR NEGATIVE: 1
RESPIRATORY NEGATIVE: 1
NEUROLOGICAL NEGATIVE: 1

## 2024-04-15 NOTE — PROGRESS NOTES
Chief Complaint:   Follow-up     History Of Present Illness:    Juan Carlos Nguyen is a 84 y.o. male presenting with followup.    He feels okay.     He remains concerned that he could have another stroke like we had last summer.    We discussed that no arrhythmias have occurred on his device check.  He continues to take Eliquis  Last Recorded Vitals:  Vitals:    04/15/24 1418   BP: 128/68   BP Location: Left arm   Patient Position: Sitting   Pulse: 75   Weight: 115 kg (253 lb)   Height: 1.829 m (6')       Past Medical History:  See list    Past Surgical History:  He has a past surgical history that includes Other surgical history (04/30/2014); Other surgical history (04/11/2022); Other surgical history (10/11/2021); Other surgical history (10/11/2021); Other surgical history (10/11/2021); Tonsillectomy (06/07/2017); Other surgical history (06/07/2017); Other surgical history (04/11/2022); Other surgical history (04/11/2022); Cholecystectomy (10/14/2014); MR angio head wo IV contrast (8/14/2023); and MR angio neck wo IV contrast (8/14/2023).      Social History:  He reports that he has never smoked. He has never used smokeless tobacco. He reports current alcohol use. He reports that he does not use drugs.    Family History:  Family History   Problem Relation Name Age of Onset    Diabetes Mother      Cancer Mother      Breast cancer Mother      Diabetes Father      Cancer Father      Prostate cancer Father      Cancer Sister      Breast cancer Sister      Other (oral cancer) Brother      Cancer Daughter      Breast cancer Daughter      Other (breast cancer in female) Other          Allergies:  Montelukast, Finasteride, Iodinated contrast media, Sulfamethizole, and Sulfamethoxazole-trimethoprim    Outpatient Medications:  Current Outpatient Medications   Medication Instructions    albuterol 90 mcg/actuation inhaler 1 puff, inhalation, Every 4 hours PRN    albuterol 2.5 mg, nebulization, Every 6 hours PRN    apixaban  (Eliquis) 5 mg tablet TAKE ONE TABLET BY MOUTH TWO TIMES A DAY    aspirin 81 mg EC tablet     atorvastatin (LIPITOR) 80 mg, oral, Daily    budesonide-formoteroL (Symbicort) 160-4.5 mcg/actuation inhaler 2 puffs, inhalation, 2 times daily, RINSE MOUTH AFTER USE    budesonide-formoteroL (Symbicort) 80-4.5 mcg/actuation inhaler 2 puffs, inhalation, 2 times daily RT, Rinse mouth with water after use to reduce aftertaste and incidence of candidiasis. Do not swallow.    calcium 500 mg calcium (1,250 mg) tablet 1 tablet, oral, Daily    cholecalciferol (Vitamin D3) 5,000 Units tablet oral, Daily    dutasteride (AVODART) 0.5 mg, oral, Daily    loratadine (CLARITIN) 10 mg, oral, Daily    Mucinex 1,200 mg, oral, Every 12 hours PRN    omega 3-dha-epa-fish oil (Fish OiL) 1,200 (144-216) mg capsule oral    pantoprazole (PROTONIX) 40 mg, oral, Daily, Do not crush, chew, or split.    propranolol (INDERAL) 20 mg, oral, 2 times daily    sotalol (BETAPACE) 80 mg, oral, Every 12 hours    terazosin (HYTRIN) 5 mg, oral, Nightly    ursodiol (ACTIGALL) 300 mg, oral, 2 times daily    valsartan (DIOVAN) 80 mg, oral, Daily, for blood pressure    zinc gluconate 50 mg tablet oral       Review of Systems   Constitutional: Negative.   Cardiovascular: Negative.    Respiratory: Negative.     Skin: Negative.    Neurological: Negative.    All other systems reviewed and are negative.      Physical Exam:  Physical Exam  Constitutional:       Appearance: Normal appearance.   HENT:      Mouth/Throat:      Mouth: Mucous membranes are dry.   Eyes:      Extraocular Movements: Extraocular movements intact.      Pupils: Pupils are equal, round, and reactive to light.   Cardiovascular:      Rate and Rhythm: Normal rate and regular rhythm.      Comments: Left side Loop Recorder.  Pulmonary:      Effort: Pulmonary effort is normal.      Breath sounds: Normal breath sounds.   Musculoskeletal:      Cervical back: Normal range of motion and neck supple.   Skin:      General: Skin is dry.   Neurological:      General: No focal deficit present.      Mental Status: He is alert and oriented to person, place, and time.            Last Labs:  CBC -  Lab Results   Component Value Date    WBC 5.3 09/08/2023    HGB 12.5 (L) 09/08/2023    HCT 39.1 (L) 09/08/2023    MCV 95 09/08/2023     (L) 09/08/2023       CMP -  Lab Results   Component Value Date    CALCIUM 9.0 09/08/2023    PHOS 3.2 08/15/2023    PROT 6.5 08/14/2023    ALBUMIN 3.6 08/15/2023    AST 15 08/14/2023    ALT 14 08/14/2023    ALKPHOS 51 08/14/2023    BILITOT 0.6 08/14/2023       LIPID PANEL -   Lab Results   Component Value Date    CHOL 101 08/14/2023    TRIG 55 08/14/2023    HDL 37.5 (A) 08/14/2023    CHHDL 2.7 08/14/2023    LDLF 53 08/14/2023    VLDL 11 08/14/2023       RENAL FUNCTION PANEL -   Lab Results   Component Value Date    GLUCOSE 106 (H) 09/08/2023     09/08/2023    K 4.2 09/08/2023     09/08/2023    CO2 30 09/08/2023    ANIONGAP 12 09/08/2023    BUN 17 09/08/2023    CREATININE 1.02 09/08/2023    GFRMALE 74 09/08/2023    CALCIUM 9.0 09/08/2023    PHOS 3.2 08/15/2023    ALBUMIN 3.6 08/15/2023        Lab Results   Component Value Date     (H) 07/21/2020    HGBA1C 5.8 (A) 08/14/2023       Last Cardiology Tests:  ECG:  ECG 12 Lead     Today.  Sinus bradycardia.  Normal axis.  Corrected QT interval 430 ms    Device check today.  [employer] NQ22 loop recorder.  No events.  Lab review: I have personally reviewed the laboratory result(s) see above    Assessment/Plan   Problem List Items Addressed This Visit             ICD-10-CM    History of ST elevation myocardial infarction (STEMI) I25.2    Paroxysmal atrial fibrillation (Multi) I48.0    Implantable loop recorder present Z95.818     Other Visit Diagnoses         Codes    Body mass index (BMI) of 34.0 to 34.9 in adult     Z68.34            Paroxysmal atrial fibrillation. Status post cardioversion 2020. Chronic. Stable.   No atrial  fibrillation on device check. Reviewed medications. Continue sotalol and Eliquis.  Discussed refills.  High risk medication. Continue Eliquis.   High risk medication-sotalol.  Follow ECG twice yearly.  [employer] LNQ22 loop recorder.  Device function.  Discussed standard of care for device follow-up.  Discussed how about monitoring works.  All questions answered  Obstructive sleep apnea. On CPAP. Follows with pulmonary. Discussed association of sleep apnea and atrial fibrillation  Asthmatic bronchitis and COPD. Discussed association with atrial fibrillation. Chronic. stable.  Frequent PVCs. Reviewed again that sotalol may also suppress PVCs.  Reinforced magnesium oxide.  Continue magnesium.  Discussed refills.  [employer] LNQ11 1 loop recorder. At replacement indicator. See above. Shared decision making performed. Informed consent obtained  Coronary disease. Remote STEMI. Chronic. Stable. Asymptomatic. Reviewed medications. Continue medications.  Refills discussed nonspecific IVCD. Stable. Discussed normal conduction.   Overweight.   Nasal polyps.  Chronic.  Stable.  AHA recommendations for exercise, diet, and behavioral modification reviewed with pt.      The patient and I discussed the mechanism of arrhythmia, atrial fibrillation, PVC, PAC, paroxysmal atrial fibrillation, loop recorder, loop recorder check, standard care for loop recorder follow-up, indications for and types of medications, discussion if and what medication refills needed, treatment options, risks, benefits, and imponderables. American Heart Association lifestyle changes and behavioral modification discussed. All questions answered in detail. Counseling over 50% visit regarding above. Patient appreciative of care.

## 2024-04-19 ENCOUNTER — HOSPITAL ENCOUNTER (OUTPATIENT)
Dept: CARDIOLOGY | Facility: HOSPITAL | Age: 84
Discharge: HOME | End: 2024-04-19
Payer: MEDICARE

## 2024-04-19 DIAGNOSIS — Z95.818 PRESENCE OF OTHER CARDIAC IMPLANTS AND GRAFTS: ICD-10-CM

## 2024-04-19 DIAGNOSIS — I48.0 PAROXYSMAL ATRIAL FIBRILLATION (MULTI): ICD-10-CM

## 2024-05-03 DIAGNOSIS — I27.20 PULMONARY HYPERTENSION (MULTI): ICD-10-CM

## 2024-05-03 DIAGNOSIS — I49.9 CARDIAC ARRHYTHMIA, UNSPECIFIED CARDIAC ARRHYTHMIA TYPE: ICD-10-CM

## 2024-05-03 RX ORDER — PROPRANOLOL HYDROCHLORIDE 20 MG/1
20 TABLET ORAL 2 TIMES DAILY
Qty: 180 TABLET | Refills: 0 | Status: SHIPPED | OUTPATIENT
Start: 2024-05-03 | End: 2024-05-06

## 2024-05-03 RX ORDER — VALSARTAN 80 MG/1
80 TABLET ORAL DAILY
Qty: 90 TABLET | Refills: 0 | Status: SHIPPED | OUTPATIENT
Start: 2024-05-03 | End: 2024-05-06

## 2024-05-04 DIAGNOSIS — I27.20 PULMONARY HYPERTENSION (MULTI): ICD-10-CM

## 2024-05-04 DIAGNOSIS — I49.9 CARDIAC ARRHYTHMIA, UNSPECIFIED CARDIAC ARRHYTHMIA TYPE: ICD-10-CM

## 2024-05-06 RX ORDER — VALSARTAN 80 MG/1
80 TABLET ORAL DAILY
Qty: 90 TABLET | Refills: 0 | Status: SHIPPED | OUTPATIENT
Start: 2024-05-06

## 2024-05-06 RX ORDER — PROPRANOLOL HYDROCHLORIDE 20 MG/1
20 TABLET ORAL 2 TIMES DAILY
Qty: 90 TABLET | Refills: 0 | Status: SHIPPED | OUTPATIENT
Start: 2024-05-06

## 2024-05-08 ENCOUNTER — OFFICE VISIT (OUTPATIENT)
Dept: PRIMARY CARE | Facility: CLINIC | Age: 84
End: 2024-05-08
Payer: MEDICARE

## 2024-05-08 VITALS
WEIGHT: 251 LBS | TEMPERATURE: 97 F | DIASTOLIC BLOOD PRESSURE: 72 MMHG | SYSTOLIC BLOOD PRESSURE: 116 MMHG | HEART RATE: 56 BPM | RESPIRATION RATE: 20 BRPM | HEIGHT: 72 IN | BODY MASS INDEX: 34 KG/M2

## 2024-05-08 DIAGNOSIS — I10 PRIMARY HYPERTENSION: ICD-10-CM

## 2024-05-08 DIAGNOSIS — I48.0 PAROXYSMAL ATRIAL FIBRILLATION (MULTI): ICD-10-CM

## 2024-05-08 DIAGNOSIS — Z12.5 SCREENING FOR PROSTATE CANCER: ICD-10-CM

## 2024-05-08 DIAGNOSIS — N40.0 BENIGN PROSTATIC HYPERPLASIA WITHOUT LOWER URINARY TRACT SYMPTOMS: ICD-10-CM

## 2024-05-08 DIAGNOSIS — Z00.00 ROUTINE GENERAL MEDICAL EXAMINATION AT HEALTH CARE FACILITY: Primary | ICD-10-CM

## 2024-05-08 DIAGNOSIS — K21.9 GASTROESOPHAGEAL REFLUX DISEASE WITHOUT ESOPHAGITIS: ICD-10-CM

## 2024-05-08 DIAGNOSIS — Z13.6 SCREENING FOR CARDIOVASCULAR CONDITION: ICD-10-CM

## 2024-05-08 DIAGNOSIS — E78.2 MIXED HYPERLIPIDEMIA: ICD-10-CM

## 2024-05-08 PROCEDURE — 3074F SYST BP LT 130 MM HG: CPT | Performed by: FAMILY MEDICINE

## 2024-05-08 PROCEDURE — 1036F TOBACCO NON-USER: CPT | Performed by: FAMILY MEDICINE

## 2024-05-08 PROCEDURE — 1159F MED LIST DOCD IN RCRD: CPT | Performed by: FAMILY MEDICINE

## 2024-05-08 PROCEDURE — 1170F FXNL STATUS ASSESSED: CPT | Performed by: FAMILY MEDICINE

## 2024-05-08 PROCEDURE — 3078F DIAST BP <80 MM HG: CPT | Performed by: FAMILY MEDICINE

## 2024-05-08 PROCEDURE — G0444 DEPRESSION SCREEN ANNUAL: HCPCS | Performed by: FAMILY MEDICINE

## 2024-05-08 PROCEDURE — 1160F RVW MEDS BY RX/DR IN RCRD: CPT | Performed by: FAMILY MEDICINE

## 2024-05-08 PROCEDURE — G0439 PPPS, SUBSEQ VISIT: HCPCS | Performed by: FAMILY MEDICINE

## 2024-05-08 PROCEDURE — 99213 OFFICE O/P EST LOW 20 MIN: CPT | Performed by: FAMILY MEDICINE

## 2024-05-08 PROCEDURE — 99397 PER PM REEVAL EST PAT 65+ YR: CPT | Performed by: FAMILY MEDICINE

## 2024-05-08 RX ORDER — SOTALOL HYDROCHLORIDE 80 MG/1
80 TABLET ORAL EVERY 12 HOURS
Qty: 180 TABLET | Refills: 1 | Status: SHIPPED | OUTPATIENT
Start: 2024-05-08

## 2024-05-08 RX ORDER — TERAZOSIN 5 MG/1
5 CAPSULE ORAL DAILY
Qty: 90 CAPSULE | Refills: 1 | Status: SHIPPED | OUTPATIENT
Start: 2024-05-08

## 2024-05-08 RX ORDER — TERAZOSIN 10 MG/1
10 CAPSULE ORAL NIGHTLY
Qty: 90 CAPSULE | Refills: 1 | Status: SHIPPED | OUTPATIENT
Start: 2024-05-08

## 2024-05-08 RX ORDER — PANTOPRAZOLE SODIUM 40 MG/1
40 TABLET, DELAYED RELEASE ORAL DAILY
Qty: 90 TABLET | Refills: 1 | Status: SHIPPED | OUTPATIENT
Start: 2024-05-08

## 2024-05-08 ASSESSMENT — PATIENT HEALTH QUESTIONNAIRE - PHQ9
2. FEELING DOWN, DEPRESSED OR HOPELESS: NOT AT ALL
SUM OF ALL RESPONSES TO PHQ9 QUESTIONS 1 AND 2: 0
1. LITTLE INTEREST OR PLEASURE IN DOING THINGS: NOT AT ALL

## 2024-05-08 ASSESSMENT — ACTIVITIES OF DAILY LIVING (ADL)
BATHING: INDEPENDENT
TAKING_MEDICATION: INDEPENDENT
GROCERY_SHOPPING: INDEPENDENT
DOING_HOUSEWORK: INDEPENDENT
MANAGING_FINANCES: INDEPENDENT
DRESSING: INDEPENDENT

## 2024-05-08 ASSESSMENT — ENCOUNTER SYMPTOMS
LOSS OF SENSATION IN FEET: 0
DEPRESSION: 0
OCCASIONAL FEELINGS OF UNSTEADINESS: 0

## 2024-05-08 NOTE — PROGRESS NOTES
History Of Present Illness  Juan Carlos Nguyen is a 84 y.o. male presenting for a Medicare Annual Wellness Exam.  Patient is here for a periodic health exam.  I reviewed previous preventative health measures including screening tests, immunizations and labs.  I reviewed screenings administered by my staff today.         PREVIOUS PREVENTATIVE HEALTH  Colonoscopy : Yes    Date: 4/13/2023  Prostate cancer screening with PSA : Yes    Date: 4/2023  Prevnar : Yes    Date: 2016, 2023  Shingrix : Yes    Date: 2022  Tdap within 10 years : Yes  Yearly Flu Shot : Yes    CURRENT FINDINGS  Falls : No  Problems with ADL's :  No  Healthy Diet : Yes  Exercise :  Yes  Vision or Hearing Problems : Yes - tested by [facility] and has OTC hearing aids.  Depression Issues : No  Alcohol Use : Yes - 7 per week  Tobacco Use : No    BPH Recheck -- Patient reports BPH symptoms are well controlled with current medications.  No change/worsening in frequency, nocturia.  Urine stream is good with little or no hesitancy.  No SE's from medications.    HTN recheck -- Patient denies chest pain, SOB, edema, palpitations on review.  Taking medication correctly and denies any side effects.    GERD recheck -- Patient reports GI symptoms are well controlled with current treatment.  No heartburn, chest pain, vomiting, diarrhea.  No SE's from current treatment.  Patient wishes to continue the same treatment.         Recheck atrial fibrillation-this is being monitored by his electrophysiologist and has been stable.  He does need a refill of sotalol today.     Past Medical History  Patient Active Problem List   Diagnosis    Actinic keratosis    Allergic rhinitis    Anxiety    Ventricular ectopy    Asthma (HHS-HCC)    Basal cell carcinoma    Primary hypertension    BPH (benign prostatic hyperplasia)    Choledocholithiasis    Chronic obstructive pulmonary disease (Multi)    Dysuria-frequency syndrome    Essential tremor    Mastodynia    Lung nodules    Lump or mass in  breast    Hyperlipidemia    History of ST elevation myocardial infarction (STEMI)    Gastroesophageal reflux disease without esophagitis    Frequent PVCs    MGD (meibomian gland dysfunction)    Mild CAD    Nuclear sclerosis of both eyes    Class 1 obesity    Obstructive sleep apnea    Osteoarthritis of knee    PAC (premature atrial contraction)    Paroxysmal atrial fibrillation (Multi)    Pulmonary hypertension (Multi)    PVD (posterior vitreous detachment), both eyes    Recurrent nephrolithiasis    Retained gallstones following laparoscopic cholecystectomy    Seborrheic keratosis, inflamed    Sinus bradycardia    Intention tremor    Trichiasis of right eyelid    Venous lake of lip    COPD (chronic obstructive pulmonary disease) (Multi)    Bilateral presbyopia    Bilateral sensorineural hearing loss    Nasal polyps    Stroke-like symptoms    Implantable loop recorder present    Localized, primary osteoarthritis    TIA (transient ischemic attack)    Anticoagulant long-term use    Chronic sinusitis    ABE (obstructive sleep apnea)    BMI 33.0-33.9,adult    Former smoker       Past Surgical History:   Procedure Laterality Date    CHOLECYSTECTOMY  10/14/2014    Cholecystectomy    MR HEAD ANGIO WO IV CONTRAST  8/14/2023    MR HEAD ANGIO WO IV CONTRAST 8/14/2023 [facility]    MR NECK ANGIO WO IV CONTRAST  8/14/2023    MR NECK ANGIO WO IV CONTRAST 8/14/2023 [facility]    OTHER SURGICAL HISTORY  04/30/2014    Left Breast Biopsy During Breast Surgery    OTHER SURGICAL HISTORY  04/11/2022    Loop recorder insertion    OTHER SURGICAL HISTORY  10/11/2021    Loop recorder insertion    OTHER SURGICAL HISTORY  10/11/2021    Tonsillectomy    OTHER SURGICAL HISTORY  10/11/2021    Cardioversion    OTHER SURGICAL HISTORY  06/07/2017    Anesthesia For Cystoscopy    OTHER SURGICAL HISTORY  04/11/2022    Colonoscopy    OTHER SURGICAL HISTORY  04/11/2022    Endoscopy    TONSILLECTOMY  06/07/2017    Tonsillectomy        Current Outpatient  Medications:     albuterol 2.5 mg /3 mL (0.083 %) nebulizer solution, Take 3 mL (2.5 mg) by nebulization every 6 hours if needed for wheezing., Disp: , Rfl:     albuterol 90 mcg/actuation inhaler, Inhale 1 puff every 4 hours if needed for wheezing., Disp: , Rfl:     apixaban (Eliquis) 5 mg tablet, TAKE ONE TABLET BY MOUTH TWO TIMES A DAY, Disp: 180 tablet, Rfl: 3    aspirin 81 mg EC tablet, , Disp: , Rfl:     atorvastatin (Lipitor) 80 mg tablet, TAKE ONE TABLET BY MOUTH ONCE DAILY, Disp: 90 tablet, Rfl: 1    budesonide-formoteroL (Symbicort) 80-4.5 mcg/actuation inhaler, Inhale 2 puffs 2 times a day. Rinse mouth with water after use to reduce aftertaste and incidence of candidiasis. Do not swallow., Disp: 30 each, Rfl: 6    calcium 500 mg calcium (1,250 mg) tablet, Take 1 tablet (1,250 mg) by mouth once daily., Disp: , Rfl:     cholecalciferol (Vitamin D3) 5,000 Units tablet, Take by mouth once daily., Disp: , Rfl:     dutasteride (Avodart) 0.5 mg capsule, Take 1 capsule (0.5 mg) by mouth once daily., Disp: , Rfl:     guaiFENesin (Mucinex) 1,200 mg tablet extended release 12hr, Take 1 tablet (1,200 mg) by mouth every 12 hours if needed., Disp: , Rfl:     loratadine (Claritin) 10 mg tablet, Take 1 tablet (10 mg) by mouth once daily., Disp: , Rfl:     omega 3-dha-epa-fish oil (Fish OiL) 1,200 (144-216) mg capsule, Take by mouth., Disp: , Rfl:     pantoprazole (ProtoNix) 40 mg EC tablet, Take 1 tablet (40 mg) by mouth once daily. Do not crush, chew, or split., Disp: 90 tablet, Rfl: 1    propranolol (Inderal) 20 mg tablet, TAKE ONE TABLET BY MOUTH TWO TIMES A DAY, Disp: 90 tablet, Rfl: 0    sotalol (Betapace) 80 mg tablet, Take 1 tablet (80 mg) by mouth every 12 hours., Disp: 180 tablet, Rfl: 1    terazosin (Hytrin) 10 mg capsule, Take 1 capsule (10 mg) by mouth once daily at bedtime., Disp: 90 capsule, Rfl: 1    terazosin (Hytrin) 5 mg capsule, Take 1 capsule (5 mg) by mouth once daily., Disp: 90 capsule, Rfl: 1     ursodiol (Actigall) 300 mg capsule, TAKE ONE CAPSULE BY MOUTH TWO TIMES A DAY, Disp: 180 capsule, Rfl: 0    valsartan (Diovan) 80 mg tablet, TAKE ONE TABLET BY MOUTH ONCE DAILY FOR BLOOD PRESSURE., Disp: 90 tablet, Rfl: 0    zinc gluconate 50 mg tablet, Take by mouth., Disp: , Rfl:    Immunization History   Administered Date(s) Administered    Flu vaccine (IIV4), preservative free *Check age/dose* 12/10/2013    Flu vaccine, quadrivalent, high-dose, preservative free, age 65y+ (FLUZONE) 10/10/2023    Influenza, Unspecified 12/01/2011, 11/16/2015, 02/20/2016, 12/20/2016, 10/12/2017, 10/12/2018, 11/11/2019, 10/08/2020, 10/23/2020, 09/27/2021, 09/28/2022    Influenza, seasonal, injectable 11/01/2022, 10/16/2023    Influenza, seasonal, injectable, preservative free 11/17/2012, 10/18/2014    Pfizer COVID-19 vaccine, Fall 2023, 12 years and older, (30mcg/0.3mL) 11/18/2023    Pfizer Gray Cap SARS-CoV-2 05/13/2022    Pfizer Purple Cap SARS-CoV-2 01/26/2021, 02/16/2021, 09/27/2021, 05/01/2022    Pneumococcal conjugate vaccine, 13-valent (PREVNAR 13) 01/15/2016    Pneumococcal polysaccharide vaccine, 23-valent, age 2 years and older (PNEUMOVAX 23) 04/12/2021, 10/01/2022    RSV, 60 Years And Older (AREXVY) 10/23/2023    Td vaccine, age 7 years and older (TENIVAC) 06/15/2010    Tdap vaccine, age 7 year and older (BOOSTRIX, ADACEL) 04/21/2022    Zoster vaccine, recombinant, adult (SHINGRIX) 07/09/2022, 09/28/2022    Zoster, live 02/21/2013        Social History  Social History     Socioeconomic History    Marital status:      Spouse name: Not on file    Number of children: Not on file    Years of education: Not on file    Highest education level: Not on file   Occupational History    Not on file   Tobacco Use    Smoking status: Never    Smokeless tobacco: Never   Vaping Use    Vaping status: Never Used   Substance and Sexual Activity    Alcohol use: Yes    Drug use: Never    Sexual activity: Not on file   Other Topics  Concern    Not on file   Social History Narrative    Not on file     Social Determinants of Health     Financial Resource Strain: Not on file   Food Insecurity: Not on file   Transportation Needs: Not on file   Physical Activity: Not on file   Stress: Not on file   Social Connections: Not on file   Intimate Partner Violence: Not on file   Housing Stability: Not on file        reports current alcohol use.    reports that he has never smoked. He has never used smokeless tobacco.    reports no history of drug use.           Allergies  Montelukast, Finasteride, Iodinated contrast media, Sulfamethizole, and Sulfamethoxazole-trimethoprim      Physical Exam   height is 1.829 m (6') and weight is 114 kg (251 lb). His temperature is 36.1 °C (97 °F). His blood pressure is 116/72 and his pulse is 56. His respiration is 20.    Physical Exam  Vitals and nursing note reviewed.   Constitutional:       General: He is not in acute distress.     Appearance: Normal appearance.   HENT:      Head: Normocephalic and atraumatic.      Right Ear: Tympanic membrane, ear canal and external ear normal.      Left Ear: Tympanic membrane, ear canal and external ear normal.      Nose: Nose normal.      Mouth/Throat:      Mouth: Mucous membranes are moist.      Pharynx: Oropharynx is clear.   Eyes:      Extraocular Movements: Extraocular movements intact.      Conjunctiva/sclera: Conjunctivae normal.      Pupils: Pupils are equal, round, and reactive to light.   Cardiovascular:      Rate and Rhythm: Normal rate and regular rhythm.      Pulses: Normal pulses.      Heart sounds: Normal heart sounds. No murmur heard.     No friction rub. No gallop.   Pulmonary:      Effort: Pulmonary effort is normal. No respiratory distress.      Breath sounds: Normal breath sounds.   Abdominal:      General: Abdomen is flat. Bowel sounds are normal. There is no distension.      Palpations: Abdomen is soft.      Tenderness: There is no abdominal tenderness.    Musculoskeletal:         General: Normal range of motion.      Cervical back: Normal range of motion and neck supple.   Lymphadenopathy:      Cervical: No cervical adenopathy.   Skin:     General: Skin is warm and dry.      Findings: No lesion or rash.   Neurological:      General: No focal deficit present.      Mental Status: He is alert. Mental status is at baseline.   Psychiatric:         Mood and Affect: Mood normal.         Behavior: Behavior normal.         Thought Content: Thought content normal.         Judgment: Judgment normal.                 Assessment      1. Routine general medical examination at health care facility  1 Year Follow Up In [facility] - PCP - Wellness Exam      2. Screening for cardiovascular condition  Lipid panel      3. Screening for prostate cancer  Prostate Specific Antigen, Screen      4. Primary hypertension  Comprehensive Metabolic Panel, CBC, terazosin (Hytrin) 5 mg capsule   Condition well controlled.  No change in current treatment regimen.  Refill given of current medication.  Appropriate labs ordered or reviewed.  Make a follow up appointment with me for recheck in 6 months.       5. Mixed hyperlipidemia  Comprehensive Metabolic Panel, CBC   Appropriate labs ordered or reviewed.     6. Gastroesophageal reflux disease without esophagitis  pantoprazole (ProtoNix) 40 mg EC tablet   Condition well controlled.  No change in current treatment regimen.  Refill given of current medication.  Make a follow up appointment with me for recheck in 6 months.       7. Paroxysmal atrial fibrillation (Multi)  sotalol (Betapace) 80 mg tablet   Condition seems well controlled.  No change in current treatment.       8. Benign prostatic hyperplasia without lower urinary tract symptoms  terazosin (Hytrin) 10 mg capsule   Condition well controlled.  No change in current treatment regimen.  Refill given of current medication.  Make a follow up appointment with me for recheck in 6  months.            I recommend regular exercise, balanced diet, regular dental exams, and healthy habits.  Patient denies depression sxs.  Patient is able to perform all ADL's without assistance.  I reminded patient to get yearly eye exams with glaucoma screening.  I recommend to eat plenty of plant foods (such as whole-grain products, fruits, and vegetables) and a moderate amount of lean and low-fat, animal-based food (meat and dairy products).  When shopping, choose lean meats, fish, and poultry. I recommend to try to get regular aerobic exercise.  I recommend a yearly flu shot in the fall and I recommend a yearly wellness exam.      Orders Placed This Encounter      pantoprazole (ProtoNix) 40 mg EC tablet      sotalol (Betapace) 80 mg tablet      terazosin (Hytrin) 10 mg capsule      terazosin (Hytrin) 5 mg capsule       Orders Placed This Encounter   Procedures    Prostate Specific Antigen, Screen    Lipid panel    Comprehensive Metabolic Panel    CBC        New Medications Ordered This Visit   Medications    pantoprazole (ProtoNix) 40 mg EC tablet     Sig: Take 1 tablet (40 mg) by mouth once daily. Do not crush, chew, or split.     Dispense:  90 tablet     Refill:  1    sotalol (Betapace) 80 mg tablet     Sig: Take 1 tablet (80 mg) by mouth every 12 hours.     Dispense:  180 tablet     Refill:  1    terazosin (Hytrin) 5 mg capsule     Sig: Take 1 capsule (5 mg) by mouth once daily.     Dispense:  90 capsule     Refill:  1    terazosin (Hytrin) 10 mg capsule     Sig: Take 1 capsule (10 mg) by mouth once daily at bedtime.     Dispense:  90 capsule     Refill:  1                   Mj Moreira MD

## 2024-05-13 ENCOUNTER — APPOINTMENT (OUTPATIENT)
Dept: SLEEP MEDICINE | Facility: CLINIC | Age: 84
End: 2024-05-13
Payer: MEDICARE

## 2024-05-17 ENCOUNTER — HOSPITAL ENCOUNTER (OUTPATIENT)
Dept: CARDIOLOGY | Facility: HOSPITAL | Age: 84
Discharge: HOME | End: 2024-05-17
Payer: MEDICARE

## 2024-05-17 DIAGNOSIS — I48.0 PAROXYSMAL ATRIAL FIBRILLATION (MULTI): ICD-10-CM

## 2024-05-17 DIAGNOSIS — Z95.818 PRESENCE OF OTHER CARDIAC IMPLANTS AND GRAFTS: ICD-10-CM

## 2024-05-17 PROCEDURE — 93298 REM INTERROG DEV EVAL SCRMS: CPT | Performed by: INTERNAL MEDICINE

## 2024-05-17 PROCEDURE — 93298 REM INTERROG DEV EVAL SCRMS: CPT

## 2024-05-20 DIAGNOSIS — N34.3 DYSURIA-FREQUENCY SYNDROME: ICD-10-CM

## 2024-05-20 RX ORDER — URSODIOL 300 MG/1
300 CAPSULE ORAL 2 TIMES DAILY
Qty: 180 CAPSULE | Refills: 1 | Status: SHIPPED | OUTPATIENT
Start: 2024-05-20

## 2024-05-23 ENCOUNTER — LAB (OUTPATIENT)
Dept: LAB | Facility: LAB | Age: 84
End: 2024-05-23
Payer: MEDICARE

## 2024-05-23 DIAGNOSIS — I10 PRIMARY HYPERTENSION: ICD-10-CM

## 2024-05-23 DIAGNOSIS — Z13.6 SCREENING FOR CARDIOVASCULAR CONDITION: ICD-10-CM

## 2024-05-23 DIAGNOSIS — E78.2 MIXED HYPERLIPIDEMIA: ICD-10-CM

## 2024-05-23 DIAGNOSIS — Z12.5 SCREENING FOR PROSTATE CANCER: ICD-10-CM

## 2024-05-23 LAB
ALBUMIN SERPL BCP-MCNC: 3.8 G/DL (ref 3.4–5)
ALP SERPL-CCNC: 47 U/L (ref 33–136)
ALT SERPL W P-5'-P-CCNC: 13 U/L (ref 10–52)
ANION GAP SERPL CALC-SCNC: 12 MMOL/L (ref 10–20)
AST SERPL W P-5'-P-CCNC: 17 U/L (ref 9–39)
BILIRUB SERPL-MCNC: 0.6 MG/DL (ref 0–1.2)
BUN SERPL-MCNC: 18 MG/DL (ref 6–23)
CALCIUM SERPL-MCNC: 9.1 MG/DL (ref 8.6–10.6)
CHLORIDE SERPL-SCNC: 106 MMOL/L (ref 98–107)
CHOLEST SERPL-MCNC: 112 MG/DL (ref 0–199)
CHOLESTEROL/HDL RATIO: 2.1
CO2 SERPL-SCNC: 30 MMOL/L (ref 21–32)
CREAT SERPL-MCNC: 1.07 MG/DL (ref 0.5–1.3)
EGFRCR SERPLBLD CKD-EPI 2021: 69 ML/MIN/1.73M*2
ERYTHROCYTE [DISTWIDTH] IN BLOOD BY AUTOMATED COUNT: 13.2 % (ref 11.5–14.5)
GLUCOSE SERPL-MCNC: 100 MG/DL (ref 74–99)
HCT VFR BLD AUTO: 41 % (ref 41–52)
HDLC SERPL-MCNC: 52.4 MG/DL
HGB BLD-MCNC: 13.3 G/DL (ref 13.5–17.5)
LDLC SERPL CALC-MCNC: 51 MG/DL
MCH RBC QN AUTO: 30.5 PG (ref 26–34)
MCHC RBC AUTO-ENTMCNC: 32.4 G/DL (ref 32–36)
MCV RBC AUTO: 94 FL (ref 80–100)
NON HDL CHOLESTEROL: 60 MG/DL (ref 0–149)
NRBC BLD-RTO: 0 /100 WBCS (ref 0–0)
PLATELET # BLD AUTO: 147 X10*3/UL (ref 150–450)
POTASSIUM SERPL-SCNC: 4.8 MMOL/L (ref 3.5–5.3)
PROT SERPL-MCNC: 6.1 G/DL (ref 6.4–8.2)
PSA SERPL-MCNC: 0.38 NG/ML
RBC # BLD AUTO: 4.36 X10*6/UL (ref 4.5–5.9)
SODIUM SERPL-SCNC: 143 MMOL/L (ref 136–145)
TRIGL SERPL-MCNC: 45 MG/DL (ref 0–149)
VLDL: 9 MG/DL (ref 0–40)
WBC # BLD AUTO: 5.5 X10*3/UL (ref 4.4–11.3)

## 2024-05-23 PROCEDURE — 36415 COLL VENOUS BLD VENIPUNCTURE: CPT

## 2024-05-23 PROCEDURE — 80061 LIPID PANEL: CPT

## 2024-05-23 PROCEDURE — 85027 COMPLETE CBC AUTOMATED: CPT

## 2024-05-23 PROCEDURE — 80053 COMPREHEN METABOLIC PANEL: CPT

## 2024-05-23 PROCEDURE — G0103 PSA SCREENING: HCPCS

## 2024-06-06 DIAGNOSIS — E78.5 HYPERLIPIDEMIA, UNSPECIFIED HYPERLIPIDEMIA TYPE: ICD-10-CM

## 2024-06-06 RX ORDER — ATORVASTATIN CALCIUM 80 MG/1
80 TABLET, FILM COATED ORAL DAILY
Qty: 90 TABLET | Refills: 1 | Status: SHIPPED | OUTPATIENT
Start: 2024-06-06

## 2024-06-07 NOTE — PROGRESS NOTES
Patient: Juan Carlos Nguyen    [id number]  : 1940 -- AGE 84 y.o.    Provider: SHAHZAD Arzola     Location [facility]   Service Date: 2024          [facility]  Follow Up Visit Note      HISTORY OF PRESENT ILLNESS     Juan Carlos Nguyen is a 84 y.o. male with a  h/o ABE and atrial fibrillation on Eliquis, CAD, MI (no stents), implantable loop recorder, TIA, COPD, essential tremor, BPH, HTN, HLD, former smoking, history of daily ETOH use, chronic sinusitis, nasal polyps s/p recent sinus surgery who presents for follow up.     24: Using dreamwear nasal pillow and chinstrap, romyue to notice air leak into eyes but not overly bothersome. Using every night with positive response. TST 7hrs/night. Patient brought in machine for compliance download.    PAP Adherence:  Machine: THERAPY: RESMED AIRSENSE 10 PRESSURE SETTINGS: 12 cm H2O  Mask: MASK TYPE: DREAMWEAR SILICONE PILLOW  Issues with therapy: ISSUES WITH THERAPY: Mask leak;   Benefits with PAP: PERCEIVED BENEFITS OF PAP: refreshing sleep reduced daytime sleepiness decreased or no snoring better sleep quality    CPAP Adherence at 12 cm H2O: Between dates 3/15/24  and 24 . Patient usage:  89/90 of nights, for an average of 7hrs  and   41minper night, with >= 4 hours usage on  99%  of nights. His residual estimated apnea/hypopnea index is 2.1 .  The average large leak is 41.1 .     23: NPV with concerns of  ABE management . He was diagnosed with ABE 20 years ago and has been compliant with CPAP since. Recently underwent bilateral endoscopic sinus surgery with ENT, Dr. Chan for massive polyps and CPAP was held after the procedure causing much difficulty with sleep. Reports he called ENT and begged him to be able to resume PAP use after about 6 days because he was unable to sleep without it.     Reports last sleep study completed at [facility] about 10 years ago. Reports he thinks his current DME is  [facility] but he is not 100% sure. Patient did not bring equipment with him to office visit today but is enrolled in [email or web address] on his phone, patient was able to share his data usage with me for review. Patient states he has been on CPAP 12CWP for many years, reports he is comfortable with current pressure but has had 40lb weight loss in the past 2 years. Using nasal pillow mask with chinstrap but still has air leak. Reports much more refreshing sleep in comparison to before starting APAP. Typical sleep schedule 11:00pm- 6:30am. Wakes refreshed. Swims 3x/week.  Typically naps a few times a week around noon for 60-90 min, naps are refreshing. Denies difficulty falling asleep, staying asleep. Patient denies symptoms of narcolepsy, cataplexy, RLS, parasomnias, and shift-work disorder.     Went back into afib yesterday per is apple watch. States he is asymptomatic, patient was able to swim this morning.     PAP Adherence:  DURABLE MEDICAL EQUIPMENT COMPANY: [employer]??  Machine: THERAPY: RESMED AIRSENSE 10 PRESSURE SETTINGS: 12 cm H2O  Mask: MASK TYPE: DREAMWEAR SILICONE PILLOW  Issues with therapy: ISSUES WITH THERAPY: reports high leak in the morning  ;   Benefits with PAP: PERCEIVED BENEFITS OF PAP: refreshing sleep reduced daytime sleepiness decreased or no snoring decreased nocturnal awakenings better sleep quality    CPAP Adherence at 12 cm H2O: Between dates 8/15  and 11/12 . Patient usage:  84/90 of nights, for an average of 7hrs  and 8min  per night, with >= 4 hours usage on  93%  of nights. His residual estimated apnea/hypopnea index is 2.02 .  The average large leak is 48.5 L/min .     SLEEP ENVIRONMENT  Sleep status: sleeps with bed partner  Preferred sleep position: side  Room is dark: Yes  Room is quiet: Yes  Room is cool: Yes  Bed comfort: good    SLEEP HABITS  Caffeine consumption: Yes, 1 cups/day  Alcohol consumption: Yes, 1 drinks/day  Smoking: No  Marijuana: No  Sleep aids: denies    SLEEP ROS  Sleep  Initiation: no problems going to sleep    Sleep Maintenance: wakes up about 2 times per night and easily returns to sleep after awakening  Problems staying asleep associated with: Problems Staying Asleep: nocturia    Breathing during sleep: no symptoms of snoring or concerns of breathing at night with PAP use       Sleep-related behaviors: dreams upon falling asleep  hypnagogic/hypnopompic hallucinations  sleep paralysis  sleep attacks  symptoms of cataplexy  sleep walking  kicking, punching, or acting out dreams  sleep eating  nightmares    Daytime Symptoms  On awakening patient reports: waking refreshed    Patient denies daytime symptoms including: Denies: excessive daytime sleepiness sleep inertia late to work/school due to sleepiness irritability during the day difficulty with memory or concentration during the day feeling sleepy when driving  Fatigue: denies feeling fatigue    RLS screen: Patient denies RLS symptoms.    WEIGHT: gained 40lbs in 2 year by diet and exercise      Claustrophobia: No     ESS: 9      REVIEW OF SYSTEMS     All other systems have been reviewed and are negative.    ALLERGIES     Allergies   Allergen Reactions    Montelukast Other    Finasteride Itching    Iodinated Contrast Media Other    Sulfamethizole Itching    Sulfamethoxazole-Trimethoprim Rash and Other     FACIAL REDNESS AND SWEATING       MEDICATIONS     Current Outpatient Medications   Medication Sig Dispense Refill    albuterol 2.5 mg /3 mL (0.083 %) nebulizer solution Take 3 mL (2.5 mg) by nebulization every 6 hours if needed for wheezing.      albuterol 90 mcg/actuation inhaler Inhale 1 puff every 4 hours if needed for wheezing.      apixaban (Eliquis) 5 mg tablet TAKE ONE TABLET BY MOUTH TWO TIMES A  tablet 3    aspirin 81 mg EC tablet       atorvastatin (Lipitor) 80 mg tablet TAKE ONE TABLET BY MOUTH EVERY DAY 90 tablet 1    calcium 500 mg calcium (1,250 mg) tablet Take 1 tablet (1,250 mg) by mouth once daily.       cholecalciferol (Vitamin D3) 5,000 Units tablet Take by mouth once daily.      dutasteride (Avodart) 0.5 mg capsule Take 1 capsule (0.5 mg) by mouth once daily.      guaiFENesin (Mucinex) 1,200 mg tablet extended release 12hr Take 1 tablet (1,200 mg) by mouth every 12 hours if needed.      loratadine (Claritin) 10 mg tablet Take 1 tablet (10 mg) by mouth once daily.      omega 3-dha-epa-fish oil (Fish OiL) 1,200 (144-216) mg capsule Take by mouth.      pantoprazole (ProtoNix) 40 mg EC tablet Take 1 tablet (40 mg) by mouth once daily. Do not crush, chew, or split. 90 tablet 1    propranolol (Inderal) 20 mg tablet TAKE ONE TABLET BY MOUTH TWO TIMES A DAY 90 tablet 0    sotalol (Betapace) 80 mg tablet Take 1 tablet (80 mg) by mouth every 12 hours. 180 tablet 1    terazosin (Hytrin) 10 mg capsule Take 1 capsule (10 mg) by mouth once daily at bedtime. 90 capsule 1    terazosin (Hytrin) 5 mg capsule Take 1 capsule (5 mg) by mouth once daily. 90 capsule 1    ursodiol (Actigall) 300 mg capsule TAKE ONE CAPSULE BY MOUTH TWO TIMES A  capsule 1    valsartan (Diovan) 80 mg tablet TAKE ONE TABLET BY MOUTH ONCE DAILY FOR BLOOD PRESSURE. 90 tablet 0    zinc gluconate 50 mg tablet Take by mouth.      budesonide-formoteroL (Symbicort) 80-4.5 mcg/actuation inhaler Inhale 2 puffs 2 times a day. Rinse mouth with water after use to reduce aftertaste and incidence of candidiasis. Do not swallow. 30 each 6     No current facility-administered medications for this visit.       PAST HISTORIES     PERTINENT PAST MEDICAL HISTORY: See HPI    PERTINENT PAST SURGICAL HISTORY for Sleep Medicine:  sinus surgery      PERTINENT FAMILY HISTORY for Sleep Medicine:  sleep apnea on PAP- brother and sister     PERTINENT SOCIAL HISTORY:  He  reports that he has never smoked. He has never used smokeless tobacco. He reports current alcohol use. He reports that he does not use drugs. He currently lives with a partner or spouse and retired from work.  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 No significant changes unless documented in the pertinent chart section. Updates made when necessary.     PHYSICAL EXAM       VITAL SIGNS: /66   Pulse 59   Resp 18   Ht 1.829 m (6')   Wt 114 kg (251 lb)   SpO2 94%   BMI 34.04 kg/m²      CURRENT WEIGHT:   Vitals:    06/12/24 1503   Weight: 114 kg (251 lb)       Body mass index is 34.04 kg/m².  PREVIOUS WEIGHTS:  Wt Readings from Last 3 Encounters:   06/12/24 114 kg (251 lb)   05/08/24 114 kg (251 lb)   04/15/24 115 kg (253 lb)     Constitutional: Alert and oriented, cooperative, no obvious distress   HEENT: Non icteric or anemic, EOM WNL bilaterally   Neck: Supple, no JVD, no goiter, no adenopathy, no rigidity     RESULTS/DATA     No results found for: \"IRON\", \"TRANSFERRIN\", \"IRONSAT\", \"TIBC\", \"FERRITIN\"    Bicarbonate   Date Value Ref Range Status   05/23/2024 30 21 - 32 mmol/L Final       ASSESSMENT/PLAN     Mr. Nguyen is a 84 y.o. male and He was referred to the [facility] for evaluation of ABE    Problem List, Orders, Assessment, Recommendations:    #ABE  - Original diagnostic not available  - PAP titration 6/29/2012 at [facility]- Obtimal CPAP pressure 12 CWP  - Retrieved and personally reviewed recent PAP adherence download data today. See HPI.  - excellent compliance to PAP therapy, residual AHI at goal, and good control of ABE symptoms  - Continue current pressure 10 CWP. DME- [facility]   - continues to have airleak with [employer] NP despite use of with chinstrap----> patient fit with F&P Solo size medium, sample mask provided in office today  - [facility] to please tag me in [facility]   - renew PAP supply orders, order placed to DME- [facility]  - retesting not clinically indicated at this time, will continue to assess   - diet, exercise, " and weight loss were emphasized today in clinic, as were non-supine sleep, avoiding alcohol in the late evening, and driving or operating heavy machinery when sleepy. Patient verbalized understanding.     # ATRIAL FIBRILLATION/HTN  - doing well  - discuss relationship of ABE and Afib in regards to treatment, encouraged nightly use of CPAP as well as this is a long-term treatment, verbalized understanding  - on meds per Cardio (OAT with Eliquis)   - Defer management to Cardio/EP Dr. Hopkins      All of patient's questions were answered. He verbalizes understanding and agreement with my assessment and plan.    Disposition    Follow up in 6 months

## 2024-06-12 ENCOUNTER — APPOINTMENT (OUTPATIENT)
Dept: SLEEP MEDICINE | Facility: CLINIC | Age: 84
End: 2024-06-12
Payer: MEDICARE

## 2024-06-12 VITALS
RESPIRATION RATE: 18 BRPM | WEIGHT: 251 LBS | BODY MASS INDEX: 34 KG/M2 | DIASTOLIC BLOOD PRESSURE: 66 MMHG | HEART RATE: 59 BPM | OXYGEN SATURATION: 94 % | HEIGHT: 72 IN | SYSTOLIC BLOOD PRESSURE: 111 MMHG

## 2024-06-12 DIAGNOSIS — Z87.891 FORMER SMOKER: ICD-10-CM

## 2024-06-12 DIAGNOSIS — I10 PRIMARY HYPERTENSION: ICD-10-CM

## 2024-06-12 DIAGNOSIS — G47.33 OSA (OBSTRUCTIVE SLEEP APNEA): Primary | ICD-10-CM

## 2024-06-12 DIAGNOSIS — E66.09 CLASS 1 OBESITY DUE TO EXCESS CALORIES WITH SERIOUS COMORBIDITY AND BODY MASS INDEX (BMI) OF 34.0 TO 34.9 IN ADULT: ICD-10-CM

## 2024-06-12 DIAGNOSIS — I48.0 PAROXYSMAL ATRIAL FIBRILLATION (MULTI): ICD-10-CM

## 2024-06-12 PROBLEM — E66.811 CLASS 1 OBESITY DUE TO EXCESS CALORIES WITH SERIOUS COMORBIDITY AND BODY MASS INDEX (BMI) OF 34.0 TO 34.9 IN ADULT: Status: ACTIVE | Noted: 2024-06-12

## 2024-06-12 PROCEDURE — 1160F RVW MEDS BY RX/DR IN RCRD: CPT | Performed by: NURSE PRACTITIONER

## 2024-06-12 PROCEDURE — 1159F MED LIST DOCD IN RCRD: CPT | Performed by: NURSE PRACTITIONER

## 2024-06-12 PROCEDURE — 3074F SYST BP LT 130 MM HG: CPT | Performed by: NURSE PRACTITIONER

## 2024-06-12 PROCEDURE — 1036F TOBACCO NON-USER: CPT | Performed by: NURSE PRACTITIONER

## 2024-06-12 PROCEDURE — 99214 OFFICE O/P EST MOD 30 MIN: CPT | Performed by: NURSE PRACTITIONER

## 2024-06-12 PROCEDURE — 3078F DIAST BP <80 MM HG: CPT | Performed by: NURSE PRACTITIONER

## 2024-06-12 ASSESSMENT — SLEEP AND FATIGUE QUESTIONNAIRES
HOW LIKELY ARE YOU TO NOD OFF OR FALL ASLEEP WHILE SITTING AND TALKING TO SOMEONE: WOULD NEVER DOZE
HOW LIKELY ARE YOU TO NOD OFF OR FALL ASLEEP WHILE WATCHING TV: SLIGHT CHANCE OF DOZING
HOW LIKELY ARE YOU TO NOD OFF OR FALL ASLEEP WHILE SITTING QUIETLY AFTER LUNCH WITHOUT ALCOHOL: SLIGHT CHANCE OF DOZING
HOW LIKELY ARE YOU TO NOD OFF OR FALL ASLEEP WHILE SITTING AND READING: SLIGHT CHANCE OF DOZING
HOW LIKELY ARE YOU TO NOD OFF OR FALL ASLEEP IN A CAR, WHILE STOPPED FOR A FEW MINUTES IN TRAFFIC: SLIGHT CHANCE OF DOZING
HOW LIKELY ARE YOU TO NOD OFF OR FALL ASLEEP WHILE LYING DOWN TO REST IN THE AFTERNOON WHEN CIRCUMSTANCES PERMIT: HIGH CHANCE OF DOZING
SITING INACTIVE IN A PUBLIC PLACE LIKE A CLASS ROOM OR A MOVIE THEATER: SLIGHT CHANCE OF DOZING
HOW LIKELY ARE YOU TO NOD OFF OR FALL ASLEEP WHEN YOU ARE A PASSENGER IN A CAR FOR AN HOUR WITHOUT A BREAK: SLIGHT CHANCE OF DOZING
ESS-CHAD TOTAL SCORE: 9

## 2024-06-12 ASSESSMENT — COLUMBIA-SUICIDE SEVERITY RATING SCALE - C-SSRS
6. HAVE YOU EVER DONE ANYTHING, STARTED TO DO ANYTHING, OR PREPARED TO DO ANYTHING TO END YOUR LIFE?: NO
2. HAVE YOU ACTUALLY HAD ANY THOUGHTS OF KILLING YOURSELF?: NO
1. IN THE PAST MONTH, HAVE YOU WISHED YOU WERE DEAD OR WISHED YOU COULD GO TO SLEEP AND NOT WAKE UP?: NO

## 2024-06-12 ASSESSMENT — ENCOUNTER SYMPTOMS
DEPRESSION: 0
OCCASIONAL FEELINGS OF UNSTEADINESS: 0
LOSS OF SENSATION IN FEET: 0

## 2024-06-21 ENCOUNTER — APPOINTMENT (OUTPATIENT)
Dept: OTOLARYNGOLOGY | Facility: CLINIC | Age: 84
End: 2024-06-21
Payer: MEDICARE

## 2024-06-21 ENCOUNTER — HOSPITAL ENCOUNTER (OUTPATIENT)
Dept: CARDIOLOGY | Facility: HOSPITAL | Age: 84
Discharge: HOME | End: 2024-06-21
Payer: MEDICARE

## 2024-06-21 DIAGNOSIS — Z95.818 PRESENCE OF OTHER CARDIAC IMPLANTS AND GRAFTS: ICD-10-CM

## 2024-06-21 DIAGNOSIS — I48.0 PAROXYSMAL ATRIAL FIBRILLATION (MULTI): ICD-10-CM

## 2024-06-21 PROCEDURE — 93298 REM INTERROG DEV EVAL SCRMS: CPT

## 2024-07-11 ENCOUNTER — APPOINTMENT (OUTPATIENT)
Dept: CARDIOLOGY | Facility: CLINIC | Age: 84
End: 2024-07-11
Payer: MEDICARE

## 2024-07-26 ENCOUNTER — HOSPITAL ENCOUNTER (OUTPATIENT)
Dept: CARDIOLOGY | Facility: HOSPITAL | Age: 84
Discharge: HOME | End: 2024-07-26
Payer: MEDICARE

## 2024-07-26 DIAGNOSIS — I48.0 PAROXYSMAL ATRIAL FIBRILLATION (MULTI): ICD-10-CM

## 2024-07-26 DIAGNOSIS — Z95.818 PRESENCE OF OTHER CARDIAC IMPLANTS AND GRAFTS: ICD-10-CM

## 2024-07-26 PROCEDURE — 93298 REM INTERROG DEV EVAL SCRMS: CPT

## 2024-08-02 DIAGNOSIS — I27.20 PULMONARY HYPERTENSION (MULTI): ICD-10-CM

## 2024-08-02 DIAGNOSIS — I49.9 CARDIAC ARRHYTHMIA, UNSPECIFIED CARDIAC ARRHYTHMIA TYPE: ICD-10-CM

## 2024-08-02 RX ORDER — VALSARTAN 80 MG/1
80 TABLET ORAL DAILY
Qty: 90 TABLET | Refills: 0 | Status: SHIPPED | OUTPATIENT
Start: 2024-08-02

## 2024-08-02 RX ORDER — PROPRANOLOL HYDROCHLORIDE 20 MG/1
20 TABLET ORAL 2 TIMES DAILY
Qty: 180 TABLET | Refills: 0 | Status: SHIPPED | OUTPATIENT
Start: 2024-08-02

## 2024-08-16 ENCOUNTER — TELEPHONE (OUTPATIENT)
Dept: PRIMARY CARE | Facility: CLINIC | Age: 84
End: 2024-08-16
Payer: MEDICARE

## 2024-08-30 ENCOUNTER — HOSPITAL ENCOUNTER (OUTPATIENT)
Dept: CARDIOLOGY | Facility: HOSPITAL | Age: 84
Discharge: HOME | End: 2024-08-30
Payer: MEDICARE

## 2024-08-30 DIAGNOSIS — I48.0 PAROXYSMAL ATRIAL FIBRILLATION (MULTI): ICD-10-CM

## 2024-08-30 DIAGNOSIS — Z95.818 PRESENCE OF OTHER CARDIAC IMPLANTS AND GRAFTS: ICD-10-CM

## 2024-08-30 PROCEDURE — 93298 REM INTERROG DEV EVAL SCRMS: CPT

## 2024-09-04 ENCOUNTER — APPOINTMENT (OUTPATIENT)
Dept: CARDIOLOGY | Facility: CLINIC | Age: 84
End: 2024-09-04
Payer: MEDICARE

## 2024-09-04 VITALS
HEIGHT: 73 IN | DIASTOLIC BLOOD PRESSURE: 60 MMHG | OXYGEN SATURATION: 97 % | WEIGHT: 241 LBS | HEART RATE: 58 BPM | SYSTOLIC BLOOD PRESSURE: 130 MMHG | BODY MASS INDEX: 31.94 KG/M2

## 2024-09-04 DIAGNOSIS — I48.0 PAROXYSMAL ATRIAL FIBRILLATION (MULTI): ICD-10-CM

## 2024-09-04 DIAGNOSIS — I25.10 MILD CAD: Primary | ICD-10-CM

## 2024-09-04 DIAGNOSIS — I10 ESSENTIAL HYPERTENSION, BENIGN: ICD-10-CM

## 2024-09-04 DIAGNOSIS — E78.5 DYSLIPIDEMIA: ICD-10-CM

## 2024-09-04 PROCEDURE — 3075F SYST BP GE 130 - 139MM HG: CPT | Performed by: NURSE PRACTITIONER

## 2024-09-04 PROCEDURE — 1160F RVW MEDS BY RX/DR IN RCRD: CPT | Performed by: NURSE PRACTITIONER

## 2024-09-04 PROCEDURE — 1036F TOBACCO NON-USER: CPT | Performed by: NURSE PRACTITIONER

## 2024-09-04 PROCEDURE — 1159F MED LIST DOCD IN RCRD: CPT | Performed by: NURSE PRACTITIONER

## 2024-09-04 PROCEDURE — 3078F DIAST BP <80 MM HG: CPT | Performed by: NURSE PRACTITIONER

## 2024-09-04 PROCEDURE — 99214 OFFICE O/P EST MOD 30 MIN: CPT | Performed by: NURSE PRACTITIONER

## 2024-09-04 NOTE — PROGRESS NOTES
"Name : Juan Carlos Nguyen   : 1940   MRN : [id number]   ENC Date : 2024    Primary Cardiologist: Dr. Barraza     CC: \"8 month\" follow up. He missed his July appointment     HPI:    Juan Carlos Nguyen is a 84 y.o. male paroxysmal atrial fibrillation (s/p loop recorder), nonobstructive CAD, hypertension, dyslipidemia, and stroke who presents today on follow up.    I met Chaparro in October for CV clearance to undergo nasal polyp removal. He established with Dr. Barraza for routine care in December.    He has done very well since his last visit. Denies any chest pain, pressure, SOB/CRAVEN, PND, orthopnea, LE edema, palpitations, lightheadedness, dizziness, or syncope at rest or with exertions. He is compliant with his medications and reports no side effects. He has been physically active:    Swims 90 laps continuously in 1 hr 3 days a week.     CV Diagnostics:  Echocardiogram 8/15/2023: EF 60 to 65%.  Moderate left atrial enlargement.  Mild to moderate MR.  Mild TR.  RVSP 40 mmHg.     Catheterization 2020: Mild disease of the LAD.  LCx codominant with mild disease.  Codominant RCA with mild disease.  EF 60%.  PA pressure 30 mmHg.  LVEDP 6 mmHg.  Wedge 13 mmHg.  Cardiac output and index 7.104.3    ROS: unless otherwise noted in the history of present illness, all other systems were reviewed and they are negative for complaints     Allergies:  Montelukast, Finasteride, Iodinated contrast media, Sulfamethizole, and Sulfamethoxazole-trimethoprim    Current Outpatient Medications   Medication Instructions    albuterol 90 mcg/actuation inhaler 1 puff, inhalation, Every 4 hours PRN    albuterol 2.5 mg, nebulization, Every 6 hours PRN    apixaban (Eliquis) 5 mg tablet TAKE ONE TABLET BY MOUTH TWO TIMES A DAY    aspirin 81 mg EC tablet     atorvastatin (LIPITOR) 80 mg, oral, Daily    budesonide-formoteroL (Symbicort) 80-4.5 mcg/actuation inhaler 2 puffs, inhalation, 2 times daily RT, Rinse mouth with water after use to " reduce aftertaste and incidence of candidiasis. Do not swallow.    calcium 500 mg calcium (1,250 mg) tablet 1 tablet, oral, Daily    cholecalciferol (Vitamin D3) 5,000 Units tablet oral, Daily    dutasteride (AVODART) 0.5 mg, oral, Daily    loratadine (CLARITIN) 10 mg, oral, Daily    Mucinex 1,200 mg, oral, Every 12 hours PRN    omega 3-dha-epa-fish oil (Fish OiL) 1,200 (144-216) mg capsule oral    pantoprazole (PROTONIX) 40 mg, oral, Daily, Do not crush, chew, or split.    propranolol (INDERAL) 20 mg, oral, 2 times daily    sotalol (BETAPACE) 80 mg, oral, Every 12 hours    terazosin (HYTRIN) 5 mg, oral, Daily    terazosin (HYTRIN) 10 mg, oral, Nightly    ursodiol (ACTIGALL) 300 mg, oral, 2 times daily    valsartan (DIOVAN) 80 mg, oral, Daily, for blood pressure    zinc gluconate 50 mg tablet oral        Last Labs:  CBC  Lab Results   Component Value Date    WBC 5.5 05/23/2024    HGB 13.3 (L) 05/23/2024    HCT 41.0 05/23/2024    MCV 94 05/23/2024     (L) 05/23/2024       CMP  Lab Results   Component Value Date    CALCIUM 9.1 05/23/2024    PHOS 3.2 08/15/2023    PROT 6.1 (L) 05/23/2024    ALBUMIN 3.8 05/23/2024    AST 17 05/23/2024    ALT 13 05/23/2024    ALKPHOS 47 05/23/2024    BILITOT 0.6 05/23/2024       BMP   Lab Results   Component Value Date     05/23/2024    K 4.8 05/23/2024     05/23/2024    CO2 30 05/23/2024    GLUCOSE 100 (H) 05/23/2024    BUN 18 05/23/2024    CREATININE 1.07 05/23/2024       LIPID PANEL   Lab Results   Component Value Date    CHOL 112 05/23/2024    TRIG 45 05/23/2024    HDL 52.4 05/23/2024    CHHDL 2.1 05/23/2024    LDLF 53 08/14/2023    VLDL 9 05/23/2024    NHDL 60 05/23/2024       RENAL FUNCTION PANEL   Lab Results   Component Value Date    GLUCOSE 100 (H) 05/23/2024     05/23/2024    K 4.8 05/23/2024     05/23/2024    CO2 30 05/23/2024    ANIONGAP 12 05/23/2024    BUN 18 05/23/2024    CREATININE 1.07 05/23/2024    GFRMALE 74 09/08/2023    CALCIUM 9.1  "05/23/2024    PHOS 3.2 08/15/2023    ALBUMIN 3.8 05/23/2024        Lab Results   Component Value Date     (H) 07/21/2020    HGBA1C 5.8 (A) 08/14/2023     I have reviewed the above labs & diagnostics    Last Recorded Vitals:  Vitals:    09/04/24 1128   BP: 130/60   BP Location: Left arm   Patient Position: Sitting   Pulse: 58   SpO2: 97%   Weight: 109 kg (241 lb)   Height: 1.854 m (6' 1\")     Physical Exam:  On exam Mr. Juan Carlos Nguyen appears his stated age, is alert and oriented x3, and in no acute distress. His sclera are anicteric and his oropharynx has moist mucous membranes. His neck is supple and without thyromegaly. The JVP is ~5 cm of water above the right atrium. His cardiac exam has regular rhythm, normal S1, S2. No S3/4. There are no murmurs. His lungs are clear to auscultation bilaterally and there is no dullness to percussion. His abdomen is soft, nontender with normoactive bowel sounds. There is no HJR. The extremities are warm and trace edema. The skin is dry. There is no rash present. The distal pulses are 2-3+ in all four extremities. His mood and affect are appropriate for todays encounter.     Assessment/Plan:  1) paroxysmal atrial fibrillation: History of stroke.  Anticoagulated with Eliquis.  Asymptomatic.  Continue to follow with EP.     2) CAD: Mild disease. Asymptomatic. No workup needed at this time.     3) dyslipidemia: LDL well-controlled.     4) hypertension: Blood pressure well-controlled.  No changes needed.     5) edema: well controlled. Reports that he wears his compression stockings if he will be sitting or standing for a long period of time.    Follow up with Dr. Barraza in ~1yr or as needed    Tracy M Schwab, APRN-CNP  "

## 2024-09-05 ENCOUNTER — OFFICE VISIT (OUTPATIENT)
Dept: PRIMARY CARE | Facility: CLINIC | Age: 84
End: 2024-09-05
Payer: MEDICARE

## 2024-09-05 VITALS
RESPIRATION RATE: 16 BRPM | HEIGHT: 73 IN | TEMPERATURE: 97.4 F | DIASTOLIC BLOOD PRESSURE: 60 MMHG | SYSTOLIC BLOOD PRESSURE: 124 MMHG | WEIGHT: 242 LBS | HEART RATE: 64 BPM | BODY MASS INDEX: 32.07 KG/M2

## 2024-09-05 DIAGNOSIS — K21.9 GASTROESOPHAGEAL REFLUX DISEASE WITHOUT ESOPHAGITIS: Primary | ICD-10-CM

## 2024-09-05 DIAGNOSIS — J44.9 CHRONIC OBSTRUCTIVE PULMONARY DISEASE, UNSPECIFIED COPD TYPE (MULTI): ICD-10-CM

## 2024-09-05 DIAGNOSIS — I27.20 PULMONARY HYPERTENSION (MULTI): ICD-10-CM

## 2024-09-05 DIAGNOSIS — G83.24: ICD-10-CM

## 2024-09-05 PROCEDURE — 3078F DIAST BP <80 MM HG: CPT | Performed by: FAMILY MEDICINE

## 2024-09-05 PROCEDURE — G2211 COMPLEX E/M VISIT ADD ON: HCPCS | Performed by: FAMILY MEDICINE

## 2024-09-05 PROCEDURE — 1160F RVW MEDS BY RX/DR IN RCRD: CPT | Performed by: FAMILY MEDICINE

## 2024-09-05 PROCEDURE — 3074F SYST BP LT 130 MM HG: CPT | Performed by: FAMILY MEDICINE

## 2024-09-05 PROCEDURE — 1158F ADVNC CARE PLAN TLK DOCD: CPT | Performed by: FAMILY MEDICINE

## 2024-09-05 PROCEDURE — 1036F TOBACCO NON-USER: CPT | Performed by: FAMILY MEDICINE

## 2024-09-05 PROCEDURE — 1159F MED LIST DOCD IN RCRD: CPT | Performed by: FAMILY MEDICINE

## 2024-09-05 PROCEDURE — 1123F ACP DISCUSS/DSCN MKR DOCD: CPT | Performed by: FAMILY MEDICINE

## 2024-09-05 PROCEDURE — 99213 OFFICE O/P EST LOW 20 MIN: CPT | Performed by: FAMILY MEDICINE

## 2024-09-05 RX ORDER — FAMOTIDINE 40 MG/1
40 TABLET, FILM COATED ORAL 2 TIMES DAILY
Qty: 60 TABLET | Refills: 1 | Status: SHIPPED | OUTPATIENT
Start: 2024-09-05

## 2024-09-05 NOTE — PROGRESS NOTES
Juan Carlos Nguyen is a 84 y.o. male here today for   Chief Complaint   Patient presents with    GERD        HPI     HAVING BURNING IN CHEST WITH eating and swallowing.  Mylanta OTC helps for awhile.  He takes pantoprazole daily.  No change in diet caused this.  No NSAIDS.  One cup of coffee per day and no ETOH either.  He had a previous EGD with [facility] 2 years ago with h hernia.  No change in BM's at all.  H/O h. Hernia.  He denies any epigastric pain or nausea and vomiting.  He denies any food sticking.  He denies any cardiac type chest pain or shortness of breath.      Current Outpatient Medications:     albuterol 2.5 mg /3 mL (0.083 %) nebulizer solution, Take 3 mL (2.5 mg) by nebulization every 6 hours if needed for wheezing., Disp: , Rfl:     albuterol 90 mcg/actuation inhaler, Inhale 1 puff every 4 hours if needed for wheezing., Disp: , Rfl:     apixaban (Eliquis) 5 mg tablet, TAKE ONE TABLET BY MOUTH TWO TIMES A DAY, Disp: 180 tablet, Rfl: 3    aspirin 81 mg EC tablet, , Disp: , Rfl:     atorvastatin (Lipitor) 80 mg tablet, TAKE ONE TABLET BY MOUTH EVERY DAY, Disp: 90 tablet, Rfl: 1    calcium 500 mg calcium (1,250 mg) tablet, Take 1 tablet (1,250 mg) by mouth once daily., Disp: , Rfl:     cholecalciferol (Vitamin D3) 5,000 Units tablet, Take by mouth once daily., Disp: , Rfl:     dutasteride (Avodart) 0.5 mg capsule, Take 1 capsule (0.5 mg) by mouth once daily., Disp: , Rfl:     guaiFENesin (Mucinex) 1,200 mg tablet extended release 12hr, Take 1 tablet (1,200 mg) by mouth every 12 hours if needed., Disp: , Rfl:     loratadine (Claritin) 10 mg tablet, Take 1 tablet (10 mg) by mouth once daily., Disp: , Rfl:     omega 3-dha-epa-fish oil (Fish OiL) 1,200 (144-216) mg capsule, Take by mouth., Disp: , Rfl:     pantoprazole (ProtoNix) 40 mg EC tablet, Take 1 tablet (40 mg) by mouth once daily. Do not crush, chew, or split., Disp: 90 tablet, Rfl: 1    propranolol (Inderal) 20 mg tablet, TAKE ONE TABLET BY MOUTH TWO  TIMES A DAY, Disp: 180 tablet, Rfl: 0    sotalol (Betapace) 80 mg tablet, Take 1 tablet (80 mg) by mouth every 12 hours., Disp: 180 tablet, Rfl: 1    terazosin (Hytrin) 10 mg capsule, Take 1 capsule (10 mg) by mouth once daily at bedtime., Disp: 90 capsule, Rfl: 1    terazosin (Hytrin) 5 mg capsule, Take 1 capsule (5 mg) by mouth once daily., Disp: 90 capsule, Rfl: 1    ursodiol (Actigall) 300 mg capsule, TAKE ONE CAPSULE BY MOUTH TWO TIMES A DAY, Disp: 180 capsule, Rfl: 1    valsartan (Diovan) 80 mg tablet, TAKE ONE TABLET BY MOUTH ONCE DAILY FOR BLOOD PRESSURE., Disp: 90 tablet, Rfl: 0    zinc gluconate 50 mg tablet, Take by mouth., Disp: , Rfl:     budesonide-formoteroL (Symbicort) 80-4.5 mcg/actuation inhaler, Inhale 2 puffs 2 times a day. Rinse mouth with water after use to reduce aftertaste and incidence of candidiasis. Do not swallow., Disp: 30 each, Rfl: 6    famotidine (Pepcid) 40 mg tablet, Take 1 tablet (40 mg) by mouth 2 times a day., Disp: 60 tablet, Rfl: 1    Patient Active Problem List   Diagnosis    Actinic keratosis    Allergic rhinitis    Anxiety    Ventricular ectopy    Asthma (HHS-HCC)    Basal cell carcinoma    Essential hypertension, benign    BPH (benign prostatic hyperplasia)    Choledocholithiasis    Chronic obstructive pulmonary disease (Multi)    Dysuria-frequency syndrome    Essential tremor    Mastodynia    Lung nodules    Lump or mass in breast    Dyslipidemia    History of ST elevation myocardial infarction (STEMI)    Gastroesophageal reflux disease without esophagitis    Frequent PVCs    MGD (meibomian gland dysfunction)    Mild CAD    Nuclear sclerosis of both eyes    Class 1 obesity    Obstructive sleep apnea    Osteoarthritis of knee    PAC (premature atrial contraction)    Paroxysmal atrial fibrillation (Multi)    Pulmonary hypertension (Multi)    PVD (posterior vitreous detachment), both eyes    Recurrent nephrolithiasis    Retained gallstones following laparoscopic  "cholecystectomy    Seborrheic keratosis, inflamed    Sinus bradycardia    Intention tremor    Trichiasis of right eyelid    Venous lake of lip    COPD (chronic obstructive pulmonary disease) (Multi)    Bilateral presbyopia    Bilateral sensorineural hearing loss    Nasal polyps    Stroke-like symptoms    Implantable loop recorder present    Localized, primary osteoarthritis    TIA (transient ischemic attack)    Anticoagulant long-term use    Chronic sinusitis    ABE (obstructive sleep apnea)    BMI 33.0-33.9,adult    Former smoker    Class 1 obesity due to excess calories with serious comorbidity and body mass index (BMI) of 34.0 to 34.9 in adult         No results found for this or any previous visit (from the past 672 hour(s)).     Objective    Visit Vitals    Visit Vitals  /60   Pulse 64   Temp 36.3 °C (97.4 °F)   Resp 16   Ht 1.854 m (6' 1\")   Wt 110 kg (242 lb)   BMI 31.93 kg/m²   Smoking Status Never   BSA 2.38 m²       Body mass index is 31.93 kg/m².     Physical Exam   General - Not in acute distress and cooperative.  Build & Nutrition - Well developed  Posture - Normal  Gait - Normal  Mental Status - alert and oriented x 3    Head - Normocephalic    Neck - Thyroid normal size    Eyes - Bilateral - Sclera clear and lids pink without edema or mass.      Skin - Warm and dry with no rashes on visible skin    Lungs - Clear to auscultation and normal breathing effort    Cardiovascular - RRR and no murmurs, rubs or thrill.    Peripheral Vascular - Bilateral - no edema present    Neuropsychiatric - normal mood and affect    Abdomen-soft and nontender and nondistended with normal bowel sounds throughout.    Assessment    1. Gastroesophageal reflux disease without esophagitis  famotidine (Pepcid) 40 mg tablet   Patient has had recurrence of dyspepsia and reflux symptoms.  We are going to add famotidine 40 mg twice daily and he will continue pantoprazole.  I told him to call his gastroenterologist office to get " an appointment because he may need another EGD to make sure there is not a more serious problem going on.  He says he will call tomorrow.  I recommend to call us and make a follow up appointment if sxs worsen or do not resolve.               Orders Placed This Encounter      famotidine (Pepcid) 40 mg tablet       No orders of the defined types were placed in this encounter.       New Medications Ordered This Visit   Medications    famotidine (Pepcid) 40 mg tablet     Sig: Take 1 tablet (40 mg) by mouth 2 times a day.     Dispense:  60 tablet     Refill:  1

## 2024-09-06 ENCOUNTER — HOSPITAL ENCOUNTER (OUTPATIENT)
Dept: CARDIOLOGY | Facility: HOSPITAL | Age: 84
Discharge: HOME | End: 2024-09-06
Payer: MEDICARE

## 2024-09-06 DIAGNOSIS — Z95.818 PRESENCE OF OTHER CARDIAC IMPLANTS AND GRAFTS: ICD-10-CM

## 2024-09-06 DIAGNOSIS — I48.0 PAROXYSMAL ATRIAL FIBRILLATION (MULTI): ICD-10-CM

## 2024-09-10 ENCOUNTER — APPOINTMENT (OUTPATIENT)
Dept: OPHTHALMOLOGY | Facility: CLINIC | Age: 84
End: 2024-09-10
Payer: MEDICARE

## 2024-09-10 DIAGNOSIS — H25.13 NUCLEAR SCLEROSIS OF BOTH EYES: ICD-10-CM

## 2024-09-10 DIAGNOSIS — H02.053 TRICHIASIS OF EYELID OF RIGHT EYE, UNSPECIFIED EYELID: ICD-10-CM

## 2024-09-10 DIAGNOSIS — H52.31 ANISOMETROPIA: ICD-10-CM

## 2024-09-10 DIAGNOSIS — H02.889 MGD (MEIBOMIAN GLAND DYSFUNCTION): Primary | ICD-10-CM

## 2024-09-10 DIAGNOSIS — H43.813 PVD (POSTERIOR VITREOUS DETACHMENT), BOTH EYES: ICD-10-CM

## 2024-09-10 DIAGNOSIS — H52.4 BILATERAL PRESBYOPIA: ICD-10-CM

## 2024-09-10 PROCEDURE — 92014 COMPRE OPH EXAM EST PT 1/>: CPT | Performed by: OPTOMETRIST

## 2024-09-10 PROCEDURE — 92015 DETERMINE REFRACTIVE STATE: CPT | Performed by: OPTOMETRIST

## 2024-09-10 ASSESSMENT — ENCOUNTER SYMPTOMS
NEUROLOGICAL NEGATIVE: 0
CONSTITUTIONAL NEGATIVE: 0
RESPIRATORY NEGATIVE: 0
HEMATOLOGIC/LYMPHATIC NEGATIVE: 0
GASTROINTESTINAL NEGATIVE: 0
PSYCHIATRIC NEGATIVE: 0
ALLERGIC/IMMUNOLOGIC NEGATIVE: 0
MUSCULOSKELETAL NEGATIVE: 0
CARDIOVASCULAR NEGATIVE: 0
ENDOCRINE NEGATIVE: 0
EYES NEGATIVE: 1

## 2024-09-10 ASSESSMENT — EXTERNAL EXAM - LEFT EYE: OS_EXAM: NORMAL

## 2024-09-10 ASSESSMENT — REFRACTION_WEARINGRX
OD_AXIS: 085
OS_AXIS: 070
OD_SPHERE: +1.00
OS_ADD: +2.50
OS_SPHERE: +1.50
OD_CYLINDER: -2.25
OS_CYLINDER: -1.50
OD_ADD: +2.50

## 2024-09-10 ASSESSMENT — VISUAL ACUITY
OS_CC+: +1
CORRECTION_TYPE: GLASSES
METHOD: SNELLEN - LINEAR
OD_CC+: -1
OD_CC: 20/25
OS_CC: 20/30

## 2024-09-10 ASSESSMENT — CONF VISUAL FIELD
OD_SUPERIOR_TEMPORAL_RESTRICTION: 0
OD_SUPERIOR_NASAL_RESTRICTION: 0
OD_NORMAL: 1
OD_INFERIOR_NASAL_RESTRICTION: 0
OS_INFERIOR_NASAL_RESTRICTION: 0
OD_INFERIOR_TEMPORAL_RESTRICTION: 0
OS_SUPERIOR_TEMPORAL_RESTRICTION: 0
OS_NORMAL: 1
OS_SUPERIOR_NASAL_RESTRICTION: 0
OS_INFERIOR_TEMPORAL_RESTRICTION: 0

## 2024-09-10 ASSESSMENT — EXTERNAL EXAM - RIGHT EYE: OD_EXAM: NORMAL

## 2024-09-10 ASSESSMENT — TONOMETRY
IOP_METHOD: GOLDMANN APPLANATION
OD_IOP_MMHG: 12
OS_IOP_MMHG: 13

## 2024-09-10 ASSESSMENT — REFRACTION_MANIFEST
OD_SPHERE: +2.25
OS_ADD: +2.50
OD_ADD: +2.50
OS_CYLINDER: -2.00
OS_AXIS: 085
OD_CYLINDER: -3.00
OS_SPHERE: +0.50
OD_AXIS: 085

## 2024-09-10 ASSESSMENT — CUP TO DISC RATIO
OD_RATIO: .4
OS_RATIO: .3

## 2024-09-10 NOTE — PROGRESS NOTES
A spectacle prescription was dispensed to be used as needed. Slightly more anisometropia and will monitor impact on comfort with new glasses.   Cataracts and VA corrects to 20/20 OD and OS.   PVD OU and stable.   Hx of CVA and minor and recovered 100%.   OD Normal 1/2 DD choroidal nevus superior temporal of CARROL within macula no high risk criteria.  No ocular vision impact.   The patient was asked to return to our clinic in one year or sooner if ocular or vision changes occur

## 2024-09-14 DIAGNOSIS — I49.9 CARDIAC ARRHYTHMIA, UNSPECIFIED CARDIAC ARRHYTHMIA TYPE: ICD-10-CM

## 2024-09-16 DIAGNOSIS — I49.9 CARDIAC ARRHYTHMIA, UNSPECIFIED CARDIAC ARRHYTHMIA TYPE: ICD-10-CM

## 2024-09-16 RX ORDER — APIXABAN 5 MG/1
TABLET, FILM COATED ORAL
Qty: 180 TABLET | Refills: 0 | Status: SHIPPED | OUTPATIENT
Start: 2024-09-16

## 2024-09-16 RX ORDER — PROPRANOLOL HYDROCHLORIDE 20 MG/1
20 TABLET ORAL 2 TIMES DAILY
Qty: 90 TABLET | Refills: 0 | Status: SHIPPED | OUTPATIENT
Start: 2024-09-16

## 2024-09-19 NOTE — PROGRESS NOTES
Patient: Juan Carlos Nguyen    [id number]  : 1940 -- AGE 84 y.o.    Provider: DAN[facility]     Location [facility]   Service Date: 24            [facility]  Follow Up Visit Note      HISTORY OF PRESENT ILLNESS       HISTORY OF PRESENT ILLNESS   Mr. 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     I have independently interviewed and examined the patient in the office and reviewed available records.    Current History    Since last visit he reports for the past month he has had an increase in shortness of breath with walking, productive cough with yellow mucus.  He is also had issues with his GERD acting up so he was 1 switched from pantoprazole to Pepcid 4 mg twice daily.  He says since switching to Pepcid his GERD has been getting better but its only been a week.  He is using Symbicort 80 mcg 2 puffs twice daily and needing to use his albuterol 3-4 times a day over the past month.   He also has a lot of wheezing.  Was having more chest tightness but since GERD is getting better the chest tightness has subsided.  He reports this year has been worse for his allergies, he is taking Claritin daily.    10/18/23: On today's visit, the patient reports he uses symbicort daily, rarely uses his albuterol. He reports he swims 3 days a week, rides his bike with no issues nut notices when he walks for a distance he feels SOB. He has not tried his albuterol inhaler to see if he improves. He wears his CPAP nightly. He states he has developed nasal polyps that have gotten worse, he has to breath out of his mouth, he is having surgery to remove the polpys 10/31/23. He is up to date with vaccines.     Dr. Ann: last seen 22  81-year-old male with medical history of coronary artery disease, GERD, dyslipidemia, asthma  was referred to me for coughing, wheezing, shortness of breath going on for 4 to 5 months.     February 9, 2021:  As per the patient, he was admitted at [facility] for UTI and then he developed shortness of breath was found to have coronary artery disease status post stent placement.      Since August he started noticing coughing, sneezing, wheezing which got worse and more at night till November. However at this time he did not had any shortness of breath as he was doing cardiac rehab without any issues.      His cough persisted and then in December he started to notice shortness of breath and went to urgent care where he was given nebulizer and prednisone which helped him. He got another prednisone course after that by his PMD and was started on Stiolto. He feels that once he is on prednisone his breathing gets better. He is taking Stiolto once a day and he is using albuterol multiple times during the day.     Today he complains of shortness of breath on exertion and has to walk on his own pace on ground level. He also complains of swelling of ankles and legs. He complains of wheezing at night. His cough is better. He does have seasonal allergies. He does have runny nose, heartburn, joint pains.     He has a history of obstructive sleep apnea for the past 20 years and has been on CPAP and has been compliant with that.     March 3, 2021:  Comes for follow-up after PFTs Singh and blood test. Compliant with Symbicort and Spiriva. Feels that breathing is better. No more wheezing. Shortness of breath has improved.     Vickie 15, 2021:  Comes for follow-up. Currently on Symbicort 2 puffs twice a day and stopped taking Spiriva since April 2021. Shortness of breath is better able to ride a bike. One episode of posttussive loss of consciousness last year. Since then denies any dizziness or loss of consciousness     no pets, change in lifestyle, new beauty products, carpets or curtains. Not noticed any mold in house      Today denies any hemoptysis, fever, , anorexia, any seasonal allergies, chest pain or heartburn.     December 15, 2021:  Comes for follow up.     He followed with Dr. Monet in the interim since his last visit, who deemed his symptoms related to asthma and did not feel the need for extra medication intervention. He also followed with Dr. Spring. He will have a PFT performed on 12/22/2021.     He is compliant with CPAP usage. He reports that he continues to have some shortness of breath on exertion such as while climbing more than 2 flights of stairs. He walks minimally, primarily due to knee problems. He uses Symbicort twice daily with no complaints. He notes that while singing, his ability to hold notes have improved. He is no longer smoking cigars.     July 6, 2022:  Comes for follow up.     His breathing is improving with the regular use of Symbicort 80. He denies any shortness of breath and swims about 3 times per week. He also bike rides for approximately 3 miles lasting 1 hour. He has difficulty with climbing stairs or walking long distances due to chronic knee pain. He denies awakening at night due to breathing difficulties. He uses his albuterol rescue inhaler about twice per month, and he has not used his nebulizer machine.     Review of Systems:-All 10 systems verified and are negative other than pertinent positive which are above     Does not complaint any side effects related to meds        PMH: as below  Surgical Hx; as below  All: as below  Social hx: Former cigar smoker quit several years ago, social alcohol, denies any illicit drugs  Family H: No family h/o lung ca   Occupational Hx; worked in [employer], [employer]. Was exposed to varnish paint fumes, asbestos in the past also exposure to soap stone and carbon black. Rest in the attached document  Meds; as per [employer]        Physical Examination  Vitals Reviewed as charted            general; Able to speak in full sentences, no use of  accessory muscles of breathing   No Pallor, Cyanosis, Clubbing   Mallampati: 3  no lymphadenopathy noted on neck examination  Chest auscultation reveals bilateral good air entry, bilateral wheezing, more on the right side.  CVS: RRR and S1 + S2 are audible with no murmurs   Abdomen is Soft and Non Tender  Extremities: warm , atraumatic , no pedal edema, pulses palpable Bilateral.  Neurological: Alert and oriented x3,   Skin ; no rash           Relevant Labs and Imaging: Personally reviewed  eos 4.7%  Food allergy panel negative  IgE 11  Respiratory allergy panel negative        6-minute walk test 2021:He was able to walk to 299 m which is 980 feet in 6-minute. His resting room air oxygen saturation was 95%. His minimum oxygen saturation during test was 92%. He was very slightly fatigued and slightly dyspneic after the test.     FeNO 71, high  FENO 52 2021     Pulmonary Function tests:Pulmonary function test 2021: Severe obstruction, bronchodilator response in FEV1, no restriction, air trapping, normal DLCO  FEV1/FVC 48  FEV1 1.45 L and 44%  FVC of 3.03 L and 68%  TLC 6.25 L and 81%  RV of 4.15 L and 140%  DLCO of 25 and 100%     Pulmonary Function tests: 2021: Moderate obstruction, bronchodilator response in FEV1, mild restriction, normal DLCO, FENO 63  FEV1/FVC: 57  FEV1: 1.74L and 53%  FVC: 3.06L and 59%  T.58L and 72%  RV: 3L and 101%  DLCO: 20.55 and 80%  Repeat FENO was in 2022, levels were 63.     xray chest: :2020: no infiltrates ,      CT chest: CT chest 2021: Shows calcified lung nodules in right upper lobe and right middle lobe. Also calcification seen in the liver and in the spleen secondary to old granulomatous disease. There also b/l UL GGO/fibrotic changes which is has been stable since 2018. Mildly enlarged pulmonary artery. There are calcified mediastinal lymph nodes.     Echocardiogram: 2020:Ejection fraction 55 to 60%. Right ventricle is enlarged  with RVSP of 70. Diastolic dysfunction. His RVSP was 18 in August 2019 with normal right ventricular size     Echocardiogram March 2021: Ejection fraction 6065%. RVSP 50. Right ventricle normal size and function diastolic dysfunction.     Right and left heart cath July 2020: Pulmonary artery 50/15, mean pulmonary artery pressure 30, mean pulmonary wedge pressure 13, RV 50/20 with a mean of 12. Cardiac output 7.1, cardiac index 4.3. PVR 4 Wood unit  Normal LV function with LVEF of 60%. Mild coronary atherosclerosis with no obstruction     Assessment;  81-year-old male with medical history of coronary artery disease, Obstructive sleep apnea on CPAP, asthma COPD overlap , afib on AC, comes for follow up after seeing Dr. Monet who does not think the patient has pulmonary arterial hypertension, likely has pulmonary venous hypertension due to diastolic heart failure and sleep apnea.He reports that he continues to have some shortness of breath on exertion such as while climbing more than 2 flights of stairs otherwise able to swim 3/week and sing in choir without any trouble     7/6/22:  Comes for follow up after PFTs showing moderate obstruction and high FENO. His breathing is improving with the regular use of Symbicort 80 bid. Denies any shortness of breath or awakening at night due to breathing difficulties. He swims about 3 times per week. He also bike rides for approximately 10 miles lasting 1 hour. He has difficulty with climbing stairs or walking long distances due to chronic knee pain. Using albuterol rescue inhaler about twice per month, has not used nebulizer.     Recommendations:  Continue with Symbicort 80, 2 puffs twice daily.  Asthma education and action plan done today, his asthma is well controlled, even with high FENO.  Advised to continue asthma follow up with PMD or with Dr. Tilley.     Patient understands the importance of follow up with PMD for health care maintenance and general medical conditions.         This note was partially generated using Dragon voice recognition and there may be incorrect words, wording, spelling, or pronunciation errors that were not corrected prior to committing the note to the medical record        Previous pulmonary history: COPD/ Asthma    Inhalers/nebulized medications: symbicort, albuterol    Hospitalization History: He has not been hospitalized over the last year for breathing related problem.    Sleep history: CPAP compliant      ALLERGIES AND MEDICATIONS     ALLERGIES  Allergies   Allergen Reactions    Montelukast Other    Finasteride Itching    Iodinated Contrast Media Other    Sulfamethizole Itching    Sulfamethoxazole-Trimethoprim Rash and Other     FACIAL REDNESS AND SWEATING       MEDICATIONS  Current Outpatient Medications   Medication Sig Dispense Refill    aspirin 81 mg EC tablet       atorvastatin (Lipitor) 80 mg tablet TAKE ONE TABLET BY MOUTH EVERY DAY 90 tablet 1    calcium 500 mg calcium (1,250 mg) tablet Take 1 tablet (1,250 mg) by mouth once daily.      cholecalciferol (Vitamin D3) 5,000 Units tablet Take by mouth once daily.      dutasteride (Avodart) 0.5 mg capsule Take 1 capsule (0.5 mg) by mouth once daily.      Eliquis 5 mg tablet TAKE ONE TABLET BY MOUTH TWO TIMES A  tablet 0    famotidine (Pepcid) 40 mg tablet Take 1 tablet (40 mg) by mouth 2 times a day. 60 tablet 1    guaiFENesin (Mucinex) 1,200 mg tablet extended release 12hr Take 1 tablet (1,200 mg) by mouth every 12 hours if needed.      loratadine (Claritin) 10 mg tablet Take 1 tablet (10 mg) by mouth once daily.      omega 3-dha-epa-fish oil (Fish OiL) 1,200 (144-216) mg capsule Take by mouth.      pantoprazole (ProtoNix) 40 mg EC tablet Take 1 tablet (40 mg) by mouth once daily. Do not crush, chew, or split. 90 tablet 1    propranolol (Inderal) 20 mg tablet TAKE ONE TABLET BY MOUTH TWO TIMES A DAY 90 tablet 0    sotalol (Betapace) 80 mg tablet Take 1 tablet (80 mg) by mouth every 12 hours. 180  tablet 1    terazosin (Hytrin) 10 mg capsule Take 1 capsule (10 mg) by mouth once daily at bedtime. 90 capsule 1    terazosin (Hytrin) 5 mg capsule Take 1 capsule (5 mg) by mouth once daily. 90 capsule 1    ursodiol (Actigall) 300 mg capsule TAKE ONE CAPSULE BY MOUTH TWO TIMES A  capsule 1    valsartan (Diovan) 80 mg tablet TAKE ONE TABLET BY MOUTH ONCE DAILY FOR BLOOD PRESSURE. 90 tablet 0    zinc gluconate 50 mg tablet Take by mouth.      albuterol 2.5 mg /3 mL (0.083 %) nebulizer solution Take 3 mL (2.5 mg) by nebulization every 6 hours if needed for wheezing. 75 mL 3    albuterol 90 mcg/actuation inhaler Inhale 1 puff every 4 hours if needed for wheezing. 18 g 6    budesonide-formoteroL (Symbicort) 160-4.5 mcg/actuation inhaler Inhale 2 puffs 2 times a day. Rinse mouth with water after use to reduce aftertaste and incidence of candidiasis. Do not swallow. 10.2 g 3    budesonide-formoteroL (Symbicort) 80-4.5 mcg/actuation inhaler Inhale 2 puffs 2 times a day. Rinse mouth with water after use to reduce aftertaste and incidence of candidiasis. Do not swallow. 30 each 6     No current facility-administered medications for this visit.         PAST HISTORY     PAST MEDICAL HISTORY  Asthma  COPD  GERD  CAD  DLD  Allergic rhinits  Nasal polyps    PAST SURGICAL HISTORY  Past Surgical History:   Procedure Laterality Date    CHOLECYSTECTOMY  10/14/2014    Cholecystectomy    MR HEAD ANGIO WO IV CONTRAST  8/14/2023    MR HEAD ANGIO WO IV CONTRAST 8/14/2023 [facility]    MR NECK ANGIO WO IV CONTRAST  8/14/2023    MR NECK ANGIO WO IV CONTRAST 8/14/2023 [facility]    OTHER SURGICAL HISTORY  04/30/2014    Left Breast Biopsy During Breast Surgery    OTHER SURGICAL HISTORY  04/11/2022    Loop recorder insertion    OTHER SURGICAL HISTORY  10/11/2021    Loop recorder insertion    OTHER SURGICAL HISTORY  10/11/2021    Tonsillectomy    OTHER SURGICAL HISTORY  10/11/2021    Cardioversion    OTHER SURGICAL HISTORY  06/07/2017     Anesthesia For Cystoscopy    OTHER SURGICAL HISTORY  04/11/2022    Colonoscopy    OTHER SURGICAL HISTORY  04/11/2022    Endoscopy    TONSILLECTOMY  06/07/2017    Tonsillectomy       IMMUNIZATION HISTORY  Immunization History   Administered Date(s) Administered    Flu vaccine (IIV4), preservative free *Check age/dose* 12/10/2013    Flu vaccine, quadrivalent, high-dose, preservative free, age 65y+ (FLUZONE) 10/10/2023    Flu vaccine, trivalent, preservative free, age 6 months and greater (Fluarix/Fluzone/Flulaval) 11/17/2012, 10/18/2014    Influenza, Unspecified 12/01/2011, 11/16/2015, 02/20/2016, 12/20/2016, 10/12/2017, 10/12/2018, 11/11/2019, 10/08/2020, 10/23/2020, 09/27/2021, 09/28/2022    Influenza, seasonal, injectable 11/01/2022, 10/16/2023    Pfizer COVID-19 vaccine, 12 years and older, (30mcg/0.3mL) (Comirnaty) 11/18/2023    Pfizer Gray Cap SARS-CoV-2 05/13/2022    Pfizer Purple Cap SARS-CoV-2 01/26/2021, 02/16/2021, 09/27/2021, 05/01/2022    Pneumococcal conjugate vaccine, 13-valent (PREVNAR 13) 01/15/2016    Pneumococcal polysaccharide vaccine, 23-valent, age 2 years and older (PNEUMOVAX 23) 08/14/2008, 04/12/2021, 10/01/2022    RSV, 60 Years And Older (AREXVY) 10/23/2023    Td vaccine, age 7 years and older (TENIVAC) 06/15/2010    Tdap vaccine, age 7 year and older (BOOSTRIX, ADACEL) 04/21/2022    Zoster vaccine, recombinant, adult (SHINGRIX) 07/09/2022, 09/28/2022    Zoster, live 02/21/2013       SOCIAL HISTORY  smoking: former cigar smoker, quit years ago   illicit drug use: none   drinking: socially  Pets: no    OCCUPATIONAL/ENVIRONMENTAL HISTORY  Retired. 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 Exposed to asbestos one day working there.   No known exposure to asbestos, silica, beryllium or inhaled metals.  No exposure to birds or exotic animals.    FAMILY HISTORY  Family History   Problem Relation Name Age of Onset    Diabetes Mother      Cancer Mother      Breast cancer Mother       Diabetes Father      Cancer Father      Prostate cancer Father      Cancer Sister      Breast cancer Sister      Other (oral cancer) Brother      Cancer Daughter      Breast cancer Daughter      Other (breast cancer in female) Other       No family history of pulmonary disease.  No family history of cancer.  No family history of autoimmune disorders.    REVIEW OF SYSTEMS     REVIEW OF SYSTEMS  Review of Systems    Constitutional: No fever, no chills, no night sweats.    Eyes: No double vision, no floaters, no dry eyes.   ENT: See HPI.   Neck: No neck stiffness.  Cardiovascular: No sharp chest pain, no heart racing, no leg swelling.  Respiratory: as noted in HPI.   Gastrointestinal: No nausea, no vomiting, no diarrhea.   Musculoskeletal: No joint pain, no back pain.   Integumentary: No rashes or sores.  Neurological: No dizziness, no headaches. Sleeping well.  Psychiatric: No mood changes.   Endocrine: No hot flashes, no cold intolerance, weight is stable.  Hematologic: No easy bruising or bleeding.    PHYSICAL EXAM     VITAL SIGNS:   Vitals:    09/20/24 0959   BP: 96/54   Pulse: 61   Resp: 18   SpO2: 93%           CURRENT WEIGHT:Body mass index is 31.53 kg/m².    PREVIOUS WEIGHTS:  Wt Readings from Last 3 Encounters:   09/20/24 108 kg (239 lb)   09/05/24 110 kg (242 lb)   09/04/24 109 kg (241 lb)       Physical Exam    Constitutional: General appearance: Alert and oriented.  No acute distress. Well developed, well nourished.  Head and face: Symmetric  ENT: external inspection of ear and nose normal. No intranasal polyps. No oropharyngeal exudates.    Oropharynx: normal   Neck: supple, no lymphadenopathy  Pulmonary: Chest is normal. No increased work of breathing or signs of respiratory distress. Clear to auscultation bilaterally - no crackles, wheezing, or rhonchi.   Cardiovascular: Heart rate and rhythm normal. Normal S1, S2 - no murmurs, gallops, or pericardial rub.   Abdomen: Soft, non tender,  +BS  Extremities: No edema. No clubbing or cyanosis of the fingernails.    Neurologic: Moves all four extremities   MSK: Normal movements of extremities. Gait normal   Psychiatric: Intact judgement and insight.    RESULTS/DATA     Pulmonary Function Test Results     Pulmonary Functions Testing Results:  -1/2022: FEV1/FVC 0.55/ FEV1 1.51 (46% +BD response )/ FVC 2.76 (62%)/ TLC 5.58 (72%) %/ DLCO 80% -mod obstruction, significant BD response. Mild restrictive disease.      6MWT 3/2021: 299m () 95%-->93% RA ANASTASIIA 5    FENO  6/2022: 63  6/2021: 52  3/2021: 71        Chest Radiograph     XR chest 2 view 09/13/2023    Narrative  Interpreted By:  PRASHANTH GORDON MD  STUDY:  Chest Radiographs;  9/13/2023, 1:29PM.    INDICATION:  Pre op.    COMPARISON:  CXR 4/2/2021.    ACCESSION NUMBER(S):  [id number]    ORDERING CLINICIAN:  CYNTHIA VÁZQUEZ MD    TECHNIQUE:  Frontal and lateral chest.    FINDINGS:    CARDIOMEDIASTINAL SILHOUETTE:  Cardiomediastinal silhouette is normal in size and configuration.    LUNGS:  Calcified granuloma right lung apex. No focal consolidation. No  pleural effusion. No pneumothorax.    ABDOMEN:  No remarkable upper abdominal findings.    BONES:  No acute osseous changes.    Impression  No acute cardiopulmonary abnormality.      Signed by Prashanth Gordon MD      Chest CT Scan     CT Chest 1/2021: Calcified nodule seen suggestive of old healed granulomatous disease. There is unchanged upper lobe fibrosis and architectural distortion as well as features suggesting chronic bronchitis or smoking related airway disease. No overt bullous disease detected. Enlarged main pulmonary artery suggestive of pulmonary arterial hypertension      Echocardiogram     Echocardiogram     [place]ative  [facility]  [address], [facility] [place] [facility] [address]  Tel [phone] Fax [phone]    TRANSTHORACIC ECHOCARDIOGRAM REPORT      Patient Name:     DENISE Sr Physician:  Ajith Sheela  Nichole NG  Study Date:       8/15/2023          Referring           SAKSHI PECK  Physician:  MRN/PID:          [id number]           PCP:  Accession/Order#: [id number]          Department          [facility]  Location:  YOB: 1940          Fellow:  Gender:           M                  Nurse:              Pedro Eubanks RN  Admit Date:       8/14/2023          Sonographer:        [facility]  Admission Status: Inpatient -        Additional Staff:  Priority discharge  Height:           187.00 cm          CC Report to:  Weight:           116.00 kg          Study Type:         Echocardiogram  BSA:              2.40 m2  Blood Pressure: 122 /66 mmHg    Diagnosis/ICD: I63.9-Cerebral Infarction, unspecified  Indication:    Stroke  Procedure/CPT: Echo Complete w Full [id number]    Patient History:  Pertinent History: HTN, COPD, A-Fib and Palpitations. Previous echocardiogram  03-.    Study Detail: The following Echo studies were performed: 2D, M-Mode, Doppler and  color flow. Agitated saline used as a contrast agent for  intraseptal flow evaluation.      PHYSICIAN INTERPRETATION:  Left Ventricle: Left ventricular systolic function is normal, with an estimated ejection fraction of 60-65%. There are no regional wall motion abnormalities. The left ventricular cavity size is normal. There is mild concentric left ventricular hypertrophy. Spectral Doppler shows a pseudonormal pattern of left ventricular diastolic filling. There is no definite left ventricular thrombus visualized. The intraventricular septum appears intact without evidence of shunting or a ventricular septal defect.  Left Atrium: The left atrium is moderately dilated. There is no atrial septal defect present.  Right Ventricle: The right ventricle is upper limits of normal in size. There is low normal right ventricular systolic function.  Right Atrium: The right atrium is mildly dilated.  Aortic Valve: The aortic valve is trileaflet.  There is mild aortic valve thickening. There is no evidence of aortic valve stenosis.  There is trivial aortic valve regurgitation. The peak instantaneous gradient of the aortic valve is 9.1 mmHg.  Mitral Valve: The mitral valve is mild to moderately thickened. There is no evidence of mitral valve prolapse. There is no evidence of mitral valve stenosis. The doppler estimated mean and peak diastolic pressure gradients are 1.0 mmHg and 5.9 mmHg respectively. There is mild mitral annular calcification. There is mild to moderate mitral valve regurgitation which is centrally directed.  Tricuspid Valve: The tricuspid valve is structurally normal. There is no evidence of tricuspid valve stenosis. There is trace to mild tricuspid regurgitation. The Doppler estimated RVSP is mildly elevated at 40.2 mmHg.  Pulmonic Valve: The pulmonic valve was not assessed. There is trace pulmonic valve regurgitation.  Pericardium: There is no pericardial effusion noted.  Aorta: The aortic root is abnormal. There is mild dilatation of the ascending aorta. The aortic root is at the upper limits of normal size.  In comparison to the previous echocardiogram(s): Prior examinations are available and were reviewed for comparison purposes. Compared with echo 3/2021 LVEF is unchanged. Mitral and tricuspid valve regurgitation have worsened. There is now bi-atrial enlargement.      CONCLUSIONS:  1. Left ventricular systolic function is normal with a 60-65% estimated ejection fraction.  2. Intact intraventricular septum without shunting or a ventricular septal defect.  3. No left ventricular thrombus visualized.  4. Spectral Doppler shows a pseudonormal pattern of left ventricular diastolic filling.  5. There is low normal right ventricular systolic function.  6. The left atrium is moderately dilated.  7. Mild to moderate mitral valve regurgitation.  8. No evidence of mitral valve prolapse.  9. There is No tricuspid stenosis.  10. Mildly elevated  RVSP.  11. Aortic valve stenosis is not present.  12. Compared with echo 3/2021 LVEF is unchanged. Mitral and tricuspid valve regurgitation have worsened. There is now bi-atrial enlargement.    QUANTITATIVE DATA SUMMARY:  2D MEASUREMENTS:  Normal Ranges:  LAs:           5.50 cm    (2.7-4.0cm)  IVSd:          1.30 cm    (0.6-1.1cm)  LVPWd:         1.28 cm    (0.6-1.1cm)  LVIDd:         5.29 cm    (3.9-5.9cm)  LVIDs:         3.40 cm  LV Mass Index: 117.7 g/m2  LV % FS        35.7 %    LA VOLUME:  Normal Ranges:  LA Vol A4C:        73.6 ml    (22+/-6mL/m2)  LA Vol A2C:        103.8 ml  LA Vol BP:         88.0 ml  LA Vol Index A4C:  30.6ml/m2  LA Vol Index A2C:  43.2 ml/m2  LA Vol Index BP:   36.6 ml/m2  LA Area A4C:       23.9 cm2  LA Area A2C:       28.6 cm2  LA Major Axis A4C: 6.6 cm  LA Major Axis A2C: 6.7 cm  LA Volume Index:   42.1 ml/m2  LA Vol A4C:        71.0 ml  LA Vol A2C:        101.0 ml    M-MODE MEASUREMENTS:  Normal Ranges:  Ao Root: 3.50 cm (2.0-3.7cm)    AORTA MEASUREMENTS:  Normal Ranges:  Ao Sinus, d: 3.40 cm (2.1-3.5cm)  Ao STJ, d:   3.70 cm (1.7-3.4cm)  Asc Ao, d:   4.20 cm (2.1-3.4cm)    LV SYSTOLIC FUNCTION BY 2D PLANIMETRY (MOD):  Normal Ranges:  EF-A4C View: 63.3 % (>=55%)  EF-A2C View: 61.7 %  EF-Biplane:  63.4 %    LV DIASTOLIC FUNCTION:  Normal Ranges:  MV Peak E:        1.04 m/s    (0.7-1.2 m/s)  MV Peak A:        0.21 m/s    (0.42-0.7 m/s)  E/A Ratio:        5.02        (1.0-2.2)  MV lateral e'     0.06 m/s  MV medial e'      0.04 m/s  E/e' Ratio:       17.50       (<8.0)  PulmV Sys Toro:    23.40 cm/s  PulmV Delvalle Toro:   59.80 cm/s  PulmV A Revs Toro: 22.10 cm/s  PulmV A Revs Dur: 111.00 msec    MITRAL VALVE:  Normal Ranges:  MV Vmax:    1.21 m/s (<=1.3m/s)  MV peak P.9 mmHg (<5mmHg)  MV mean P.0 mmHg (<48mmHg)  MV DT:      172 msec (150-240msec)    AORTIC VALVE:  Normal Ranges:  AoV Vmax:      1.51 m/s (<=1.7m/s)  AoV Peak P.1 mmHg (<20mmHg)  LVOT Max Toro:  0.96 m/s  (<=1.1m/s)  LVOT VTI:      20.50 cm  LVOT Diameter: 2.00 cm  (1.8-2.4cm)  AoV Area,Vmax: 1.99 cm2 (2.5-4.5cm2)      RIGHT VENTRICLE:  RV Basal 4.80 cm  RV Mid   3.80 cm  RV Major 7.9 cm  TAPSE:   23.0 mm  RV s'    0.15 m/s    TRICUSPID VALVE/RVSP:  Normal Ranges:  Peak TR Velocity: 3.05 m/s  RV Syst Pressure: 40.2 mmHg (< 30mmHg)    PULMONIC VALVE:  Normal Ranges:  PV Accel Time: 108 msec (>120ms)  PV Max Toro:    1.2 m/s  (0.6-0.9m/s)  PV Max P.2 mmHg    Pulmonary Veins:  PulmV A Revs Dur: 111.00 msec  PulmV A Revs Toro: 22.10 cm/s  PulmV Delvalle Toro:   59.80 cm/s  PulmV Sys Toro:    23.40 cm/s      [id number] Sheela Varela MD  Electronically signed on 8/15/2023 at 5:30:38 PM             ASSESSMENT/PLAN     Mr. 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       Problem List and Orders  Problem List Items Addressed This Visit       Asthma ([facility])    Relevant Medications    budesonide-formoteroL (Symbicort) 160-4.5 mcg/actuation inhaler    albuterol 90 mcg/actuation inhaler    albuterol 2.5 mg /3 mL (0.083 %) nebulizer solution    Other Relevant Orders    CBC and Auto Differential    Respiratory Allergy Profile IgE    Chronic obstructive pulmonary disease (Multi) - Primary       Problem List and Orders  Problem List Items Addressed This Visit       Asthma ([facility])    Relevant Medications    budesonide-formoteroL (Symbicort) 160-4.5 mcg/actuation inhaler    albuterol 90 mcg/actuation inhaler    albuterol 2.5 mg /3 mL (0.083 %) nebulizer solution    Other Relevant Orders    CBC and Auto Differential    Respiratory Allergy Profile IgE    Chronic obstructive pulmonary disease (Multi) - Primary       Assessment and Plan / Recommendations:  Problem List Items Addressed This Visit        Asthma/COPD overlap  - START Symbicort 160mcg 2 puffs twice daily  - continue albuterol HFA  or albuterol nebulizers every 4-6 hours as needed for shortness of breath  - will get CBC w/diff and RASP    Follow up in 2-3 months or sooner     If you have any questions please call the office [phone]    Thank you for visiting the Pulmonary clinic today!   Zhanna Castillo CNP  [phone]

## 2024-09-20 ENCOUNTER — OFFICE VISIT (OUTPATIENT)
Dept: PULMONOLOGY | Facility: CLINIC | Age: 84
End: 2024-09-20
Payer: MEDICARE

## 2024-09-20 ENCOUNTER — LAB (OUTPATIENT)
Dept: LAB | Facility: LAB | Age: 84
End: 2024-09-20
Payer: MEDICARE

## 2024-09-20 VITALS
HEART RATE: 61 BPM | DIASTOLIC BLOOD PRESSURE: 54 MMHG | WEIGHT: 239 LBS | SYSTOLIC BLOOD PRESSURE: 96 MMHG | OXYGEN SATURATION: 93 % | BODY MASS INDEX: 31.68 KG/M2 | RESPIRATION RATE: 18 BRPM | HEIGHT: 73 IN

## 2024-09-20 DIAGNOSIS — J44.9 CHRONIC OBSTRUCTIVE PULMONARY DISEASE, UNSPECIFIED COPD TYPE (MULTI): Primary | ICD-10-CM

## 2024-09-20 DIAGNOSIS — J45.40 MODERATE PERSISTENT ASTHMA WITHOUT COMPLICATION (HHS-HCC): ICD-10-CM

## 2024-09-20 DIAGNOSIS — J45.20 MILD INTERMITTENT ASTHMA WITHOUT COMPLICATION (HHS-HCC): ICD-10-CM

## 2024-09-20 LAB
BASOPHILS # BLD AUTO: 0.05 X10*3/UL (ref 0–0.1)
BASOPHILS NFR BLD AUTO: 0.7 %
EOSINOPHIL # BLD AUTO: 0.85 X10*3/UL (ref 0–0.4)
EOSINOPHIL NFR BLD AUTO: 12.5 %
ERYTHROCYTE [DISTWIDTH] IN BLOOD BY AUTOMATED COUNT: 12.5 % (ref 11.5–14.5)
HCT VFR BLD AUTO: 43.2 % (ref 41–52)
HGB BLD-MCNC: 13.7 G/DL (ref 13.5–17.5)
IMM GRANULOCYTES # BLD AUTO: 0.02 X10*3/UL (ref 0–0.5)
IMM GRANULOCYTES NFR BLD AUTO: 0.3 % (ref 0–0.9)
LYMPHOCYTES # BLD AUTO: 0.98 X10*3/UL (ref 0.8–3)
LYMPHOCYTES NFR BLD AUTO: 14.5 %
MCH RBC QN AUTO: 30.4 PG (ref 26–34)
MCHC RBC AUTO-ENTMCNC: 31.7 G/DL (ref 32–36)
MCV RBC AUTO: 96 FL (ref 80–100)
MONOCYTES # BLD AUTO: 0.66 X10*3/UL (ref 0.05–0.8)
MONOCYTES NFR BLD AUTO: 9.7 %
NEUTROPHILS # BLD AUTO: 4.22 X10*3/UL (ref 1.6–5.5)
NEUTROPHILS NFR BLD AUTO: 62.3 %
NRBC BLD-RTO: 0 /100 WBCS (ref 0–0)
PLATELET # BLD AUTO: 160 X10*3/UL (ref 150–450)
RBC # BLD AUTO: 4.51 X10*6/UL (ref 4.5–5.9)
WBC # BLD AUTO: 6.8 X10*3/UL (ref 4.4–11.3)

## 2024-09-20 PROCEDURE — 1159F MED LIST DOCD IN RCRD: CPT

## 2024-09-20 PROCEDURE — 82785 ASSAY OF IGE: CPT

## 2024-09-20 PROCEDURE — 85025 COMPLETE CBC W/AUTO DIFF WBC: CPT

## 2024-09-20 PROCEDURE — 3078F DIAST BP <80 MM HG: CPT

## 2024-09-20 PROCEDURE — 3074F SYST BP LT 130 MM HG: CPT

## 2024-09-20 PROCEDURE — 36415 COLL VENOUS BLD VENIPUNCTURE: CPT

## 2024-09-20 PROCEDURE — 1036F TOBACCO NON-USER: CPT

## 2024-09-20 PROCEDURE — 1123F ACP DISCUSS/DSCN MKR DOCD: CPT

## 2024-09-20 PROCEDURE — 86003 ALLG SPEC IGE CRUDE XTRC EA: CPT

## 2024-09-20 PROCEDURE — 99214 OFFICE O/P EST MOD 30 MIN: CPT

## 2024-09-20 RX ORDER — BUDESONIDE AND FORMOTEROL FUMARATE DIHYDRATE 160; 4.5 UG/1; UG/1
2 AEROSOL RESPIRATORY (INHALATION)
Qty: 10.2 G | Refills: 3 | Status: SHIPPED | OUTPATIENT
Start: 2024-09-20

## 2024-09-20 RX ORDER — ALBUTEROL SULFATE 90 UG/1
1 INHALANT RESPIRATORY (INHALATION) EVERY 4 HOURS PRN
Qty: 18 G | Refills: 6 | Status: SHIPPED | OUTPATIENT
Start: 2024-09-20

## 2024-09-20 RX ORDER — ALBUTEROL SULFATE 0.83 MG/ML
2.5 SOLUTION RESPIRATORY (INHALATION) EVERY 6 HOURS PRN
Qty: 75 ML | Refills: 3 | Status: SHIPPED | OUTPATIENT
Start: 2024-09-20

## 2024-09-20 ASSESSMENT — PATIENT HEALTH QUESTIONNAIRE - PHQ9
2. FEELING DOWN, DEPRESSED OR HOPELESS: NOT AT ALL
1. LITTLE INTEREST OR PLEASURE IN DOING THINGS: NOT AT ALL
SUM OF ALL RESPONSES TO PHQ9 QUESTIONS 1 AND 2: 0

## 2024-09-20 ASSESSMENT — ENCOUNTER SYMPTOMS
OCCASIONAL FEELINGS OF UNSTEADINESS: 0
LOSS OF SENSATION IN FEET: 0
DEPRESSION: 0

## 2024-09-21 LAB

## 2024-10-03 RX ORDER — PREDNISONE 10 MG/1
TABLET ORAL
Qty: 30 TABLET | Refills: 0 | Status: SHIPPED | OUTPATIENT
Start: 2024-10-03 | End: 2024-11-02

## 2024-10-03 NOTE — PROGRESS NOTES
Patient: Juan Carlos Nguyen    [id number]  : 1940 -- AGE 84 y.o.    Provider: DANIELE [facility]to[facility]     Location [facility]   Service Date: 10/23/24            [facility]  Follow Up Visit Note      HISTORY OF PRESENT ILLNESS       HISTORY OF PRESENT ILLNESS   Mr. 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     I have independently interviewed and examined the patient in the office and reviewed available records.    Current History    Since last visit I ordered prednisone taper 10/17/24 for exacerbation. States he is feeling much better and back to his baseline. He has been using Symbicort 160 and has noticed an improvement (been using for 1.5 months). Only used albuterol during asthma exacerbation, rarely uses other than then. Has noticed his voice is raspy, with post nasal drip and throat clearing. Uses nasacort for nasal polyps.  He denies cough, wheeze, chest tightness, shortness of breath and ER visits for breathing issues.    24: Since last visit he reports for the past month he has had an increase in shortness of breath with walking, productive cough with yellow mucus.  He is also had issues with his GERD acting up so he was 1 switched from pantoprazole to Pepcid 4 mg twice daily.  He says since switching to Pepcid his GERD has been getting better but its only been a week.  He is using Symbicort 80 mcg 2 puffs twice daily and needing to use his albuterol 3-4 times a day over the past month.   He also has a lot of wheezing.  Was having more chest tightness but since GERD is getting better the chest tightness has subsided.  He reports this year has been worse for his allergies, he is taking Claritin daily.    10/18/23: On today's visit, the patient reports he uses symbicort daily, rarely uses his albuterol. He  reports he swims 3 days a week, rides his bike with no issues nut notices when he walks for a distance he feels SOB. He has not tried his albuterol inhaler to see if he improves. He wears his CPAP nightly. He states he has developed nasal polyps that have gotten worse, he has to breath out of his mouth, he is having surgery to remove the polpys 10/31/23. He is up to date with vaccines.     Dr. Ann: last seen 7/6/22  81-year-old male with medical history of coronary artery disease, GERD, dyslipidemia, asthma was referred to me for coughing, wheezing, shortness of breath going on for 4 to 5 months.     February 9, 2021:  As per the patient, he was admitted at [facility] for UTI and then he developed shortness of breath was found to have coronary artery disease status post stent placement.      Since August he started noticing coughing, sneezing, wheezing which got worse and more at night till November. However at this time he did not had any shortness of breath as he was doing cardiac rehab without any issues.      His cough persisted and then in December he started to notice shortness of breath and went to urgent care where he was given nebulizer and prednisone which helped him. He got another prednisone course after that by his PMD and was started on Stiolto. He feels that once he is on prednisone his breathing gets better. He is taking Stiolto once a day and he is using albuterol multiple times during the day.     Today he complains of shortness of breath on exertion and has to walk on his own pace on ground level. He also complains of swelling of ankles and legs. He complains of wheezing at night. His cough is better. He does have seasonal allergies. He does have runny nose, heartburn, joint pains.     He has a history of obstructive sleep apnea for the past 20 years and has been on CPAP and has been compliant with that.     March 3, 2021:  Comes for follow-up after PFTs Singh and blood test. Compliant  with Symbicort and Spiriva. Feels that breathing is better. No more wheezing. Shortness of breath has improved.     Vickie 15, 2021:  Comes for follow-up. Currently on Symbicort 2 puffs twice a day and stopped taking Spiriva since April 2021. Shortness of breath is better able to ride a bike. One episode of posttussive loss of consciousness last year. Since then denies any dizziness or loss of consciousness     no pets, change in lifestyle, new beauty products, carpets or curtains. Not noticed any mold in house     Today denies any hemoptysis, fever, , anorexia, any seasonal allergies, chest pain or heartburn.     December 15, 2021:  Comes for follow up.     He followed with Dr. Monet in the interim since his last visit, who deemed his symptoms related to asthma and did not feel the need for extra medication intervention. He also followed with Dr. Spring. He will have a PFT performed on 12/22/2021.     He is compliant with CPAP usage. He reports that he continues to have some shortness of breath on exertion such as while climbing more than 2 flights of stairs. He walks minimally, primarily due to knee problems. He uses Symbicort twice daily with no complaints. He notes that while singing, his ability to hold notes have improved. He is no longer smoking cigars.     July 6, 2022:  Comes for follow up.     His breathing is improving with the regular use of Symbicort 80. He denies any shortness of breath and swims about 3 times per week. He also bike rides for approximately 3 miles lasting 1 hour. He has difficulty with climbing stairs or walking long distances due to chronic knee pain. He denies awakening at night due to breathing difficulties. He uses his albuterol rescue inhaler about twice per month, and he has not used his nebulizer machine.     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 Rest in the attached document  Meds; as per [employer]        Physical Examination  Vitals Reviewed as charted            general; Able to speak in full sentences, no use of accessory muscles of breathing   No Pallor, Cyanosis, Clubbing   Mallampati: 3  no lymphadenopathy noted on neck examination  Chest auscultation reveals bilateral good air entry, bilateral wheezing, more on the right side.  CVS: RRR and S1 + S2 are audible with no murmurs   Abdomen is Soft and Non Tender  Extremities: warm , atraumatic , no pedal edema, pulses palpable Bilateral.  Neurological: Alert and oriented x3,   Skin ; no rash           Relevant Labs and Imaging: Personally reviewed  eos 4.7%  Food allergy panel negative  IgE 11  Respiratory allergy panel negative        6-minute walk test 2021:He was able to walk to 299 m which is 980 feet in 6-minute. His resting room air oxygen saturation was 95%. His minimum oxygen saturation during test was 92%. He was very slightly fatigued and slightly dyspneic after the test.     FeNO 71, high  FENO 52 2021     Pulmonary Function tests:Pulmonary function test 2021: Severe obstruction, bronchodilator response in FEV1, no restriction, air trapping, normal DLCO  FEV1/FVC 48  FEV1 1.45 L and 44%  FVC of 3.03 L and 68%  TLC 6.25 L and 81%  RV of 4.15 L and 140%  DLCO of 25 and 100%     Pulmonary Function tests: 2021: Moderate obstruction, bronchodilator response in FEV1, mild restriction, normal DLCO, FENO 63  FEV1/FVC: 57  FEV1: 1.74L and 53%  FVC: 3.06L and 59%  T.58L and 72%  RV: 3L and 101%  DLCO: 20.55 and 80%  Repeat FENO  was in 6/2022, levels were 63.     xray chest: :12/2020: no infiltrates ,      CT chest: CT chest January 2021: Shows calcified lung nodules in right upper lobe and right middle lobe. Also calcification seen in the liver and in the spleen secondary to old granulomatous disease. There also b/l UL GGO/fibrotic changes which is has been stable since 2018. Mildly enlarged pulmonary artery. There are calcified mediastinal lymph nodes.     Echocardiogram: July 2020:Ejection fraction 55 to 60%. Right ventricle is enlarged with RVSP of 70. Diastolic dysfunction. His RVSP was 18 in August 2019 with normal right ventricular size     Echocardiogram March 2021: Ejection fraction 6065%. RVSP 50. Right ventricle normal size and function diastolic dysfunction.     Right and left heart cath July 2020: Pulmonary artery 50/15, mean pulmonary artery pressure 30, mean pulmonary wedge pressure 13, RV 50/20 with a mean of 12. Cardiac output 7.1, cardiac index 4.3. PVR 4 Wood unit  Normal LV function with LVEF of 60%. Mild coronary atherosclerosis with no obstruction     Assessment;  81-year-old male with medical history of coronary artery disease, Obstructive sleep apnea on CPAP, asthma COPD overlap , afib on AC, comes for follow up after seeing Dr. Monet who does not think the patient has pulmonary arterial hypertension, likely has pulmonary venous hypertension due to diastolic heart failure and sleep apnea.He reports that he continues to have some shortness of breath on exertion such as while climbing more than 2 flights of stairs otherwise able to swim 3/week and sing in choir without any trouble     7/6/22:  Comes for follow up after PFTs showing moderate obstruction and high FENO. His breathing is improving with the regular use of Symbicort 80 bid. Denies any shortness of breath or awakening at night due to breathing difficulties. He swims about 3 times per week. He also bike rides for approximately 10 miles lasting 1 hour. He has  difficulty with climbing stairs or walking long distances due to chronic knee pain. Using albuterol rescue inhaler about twice per month, has not used nebulizer.     Recommendations:  Continue with Symbicort 80, 2 puffs twice daily.  Asthma education and action plan done today, his asthma is well controlled, even with high FENO.  Advised to continue asthma follow up with PMD or with Dr. Tilley.     Patient understands the importance of follow up with PMD for health care maintenance and general medical conditions.        This note was partially generated using Dragon voice recognition and there may be incorrect words, wording, spelling, or pronunciation errors that were not corrected prior to committing the note to the medical record        Previous pulmonary history: COPD/ Asthma    Inhalers/nebulized medications: symbicort, albuterol    Hospitalization History: He has not been hospitalized over the last year for breathing related problem.    Sleep history: CPAP compliant      ALLERGIES AND MEDICATIONS     ALLERGIES  Allergies   Allergen Reactions    Montelukast Other    Finasteride Itching    Iodinated Contrast Media Other    Sulfamethizole Itching    Sulfamethoxazole-Trimethoprim Rash and Other     FACIAL REDNESS AND SWEATING       MEDICATIONS  Current Outpatient Medications   Medication Sig Dispense Refill    albuterol 2.5 mg /3 mL (0.083 %) nebulizer solution Take 3 mL (2.5 mg) by nebulization every 6 hours if needed for wheezing. 75 mL 3    albuterol 90 mcg/actuation inhaler Inhale 1 puff every 4 hours if needed for wheezing. 18 g 6    aspirin 81 mg EC tablet       atorvastatin (Lipitor) 80 mg tablet TAKE ONE TABLET BY MOUTH EVERY DAY 90 tablet 1    budesonide-formoteroL (Symbicort) 160-4.5 mcg/actuation inhaler Inhale 2 puffs 2 times a day. Rinse mouth with water after use to reduce aftertaste and incidence of candidiasis. Do not swallow. 10.2 g 3    budesonide-formoteroL (Symbicort) 80-4.5 mcg/actuation inhaler  Inhale 2 puffs 2 times a day. Rinse mouth with water after use to reduce aftertaste and incidence of candidiasis. Do not swallow. 30 each 6    calcium 500 mg calcium (1,250 mg) tablet Take 1 tablet (1,250 mg) by mouth once daily.      cephalexin (Keflex) 500 mg capsule Take 1 capsule (500 mg) by mouth 2 times a day for 7 days. 14 capsule 0    cholecalciferol (Vitamin D3) 5,000 Units tablet Take by mouth once daily.      dutasteride (Avodart) 0.5 mg capsule Take 1 capsule (0.5 mg) by mouth once daily.      Eliquis 5 mg tablet TAKE ONE TABLET BY MOUTH TWO TIMES A  tablet 0    famotidine (Pepcid) 40 mg tablet Take 1 tablet (40 mg) by mouth 2 times a day. 60 tablet 1    guaiFENesin (Mucinex) 1,200 mg tablet extended release 12hr Take 1 tablet (1,200 mg) by mouth every 12 hours if needed.      loratadine (Claritin) 10 mg tablet Take 1 tablet (10 mg) by mouth once daily.      omega 3-dha-epa-fish oil (Fish OiL) 1,200 (144-216) mg capsule Take by mouth.      pantoprazole (ProtoNix) 40 mg EC tablet Take 1 tablet (40 mg) by mouth once daily. Do not crush, chew, or split. 90 tablet 1    predniSONE (Deltasone) 10 mg tablet Take 4 tabs (40mg) daily for 3 days, then 3 tabs (30mg) daily for 3 days,  2 tabs (20mg) daily for 3 days,  1 tab (10 mg) daily for 3 days. 30 tablet 0    propranolol (Inderal) 20 mg tablet TAKE ONE TABLET BY MOUTH TWO TIMES A DAY 90 tablet 0    sotalol (Betapace) 80 mg tablet Take 1 tablet (80 mg) by mouth every 12 hours. 180 tablet 1    terazosin (Hytrin) 10 mg capsule Take 1 capsule (10 mg) by mouth once daily at bedtime. 90 capsule 1    terazosin (Hytrin) 5 mg capsule Take 1 capsule (5 mg) by mouth once daily. 90 capsule 1    ursodiol (Actigall) 300 mg capsule TAKE ONE CAPSULE BY MOUTH TWO TIMES A  capsule 1    valsartan (Diovan) 80 mg tablet TAKE ONE TABLET BY MOUTH ONCE DAILY FOR BLOOD PRESSURE. 90 tablet 0    zinc gluconate 50 mg tablet Take by mouth.       No current  facility-administered medications for this visit.         PAST HISTORY     PAST MEDICAL HISTORY  Asthma/ COPD overlap  GERD  CAD  DLD  Allergic rhinits  Nasal polyps    PAST SURGICAL HISTORY  Past Surgical History:   Procedure Laterality Date    CHOLECYSTECTOMY  10/14/2014    Cholecystectomy    MR HEAD ANGIO WO IV CONTRAST  8/14/2023    MR HEAD ANGIO WO IV CONTRAST 8/14/2023 [facility]    MR NECK ANGIO WO IV CONTRAST  8/14/2023    MR NECK ANGIO WO IV CONTRAST 8/14/2023 [facility]    OTHER SURGICAL HISTORY  04/30/2014    Left Breast Biopsy During Breast Surgery    OTHER SURGICAL HISTORY  04/11/2022    Loop recorder insertion    OTHER SURGICAL HISTORY  10/11/2021    Loop recorder insertion    OTHER SURGICAL HISTORY  10/11/2021    Tonsillectomy    OTHER SURGICAL HISTORY  10/11/2021    Cardioversion    OTHER SURGICAL HISTORY  06/07/2017    Anesthesia For Cystoscopy    OTHER SURGICAL HISTORY  04/11/2022    Colonoscopy    OTHER SURGICAL HISTORY  04/11/2022    Endoscopy    TONSILLECTOMY  06/07/2017    Tonsillectomy       IMMUNIZATION HISTORY  Immunization History   Administered Date(s) Administered    Flu vaccine (IIV4), preservative free *Check age/dose* 12/10/2013    Flu vaccine, quadrivalent, high-dose, preservative free, age 65y+ (FLUZONE) 10/10/2023    Flu vaccine, trivalent, preservative free, age 6 months and greater (Fluarix/Fluzone/Flulaval) 11/17/2012, 10/18/2014    Influenza, Unspecified 12/01/2011, 11/16/2015, 02/20/2016, 12/20/2016, 10/12/2017, 10/12/2018, 11/11/2019, 10/08/2020, 10/23/2020, 09/27/2021, 09/28/2022    Influenza, seasonal, injectable 11/01/2022, 10/16/2023    Pfizer COVID-19 vaccine, 12 years and older, (30mcg/0.3mL) (Comirnaty) 11/18/2023    Pfizer Gray Cap SARS-CoV-2 05/13/2022    Pfizer Purple Cap SARS-CoV-2 01/26/2021, 02/16/2021, 09/27/2021, 05/01/2022    Pneumococcal conjugate vaccine, 13-valent (PREVNAR 13) 01/15/2016    Pneumococcal polysaccharide vaccine, 23-valent, age 2 years and older  (PNEUMOVAX 23) 08/14/2008, 04/12/2021, 10/01/2022    RSV, 60 Years And Older (AREXVY) 10/23/2023    Td vaccine, age 7 years and older (TENIVAC) 06/15/2010    Tdap vaccine, age 7 year and older (BOOSTRIX, ADACEL) 04/21/2022    Zoster vaccine, recombinant, adult (SHINGRIX) 07/09/2022, 09/28/2022    Zoster, live 02/21/2013       SOCIAL HISTORY  smoking: former cigar smoker, quit years ago   illicit drug use: none   drinking: socially  Pets: no    OCCUPATIONAL/ENVIRONMENTAL HISTORY  Retired. 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 Exposed to asbestos one day working there.   No known exposure to asbestos, silica, beryllium or inhaled metals.  No exposure to birds or exotic animals.    FAMILY HISTORY  Family History   Problem Relation Name Age of Onset    Diabetes Mother      Cancer Mother      Breast cancer Mother      Diabetes Father      Cancer Father      Prostate cancer Father      Cancer Sister      Breast cancer Sister      Other (oral cancer) Brother      Cancer Daughter      Breast cancer Daughter      Other (breast cancer in female) Other       No family history of pulmonary disease.  No family history of cancer.  No family history of autoimmune disorders.    REVIEW OF SYSTEMS     REVIEW OF SYSTEMS  Review of Systems    Constitutional: No fever, no chills, no night sweats.    Eyes: No double vision, no floaters, no dry eyes.   ENT: See HPI.   Neck: No neck stiffness.  Cardiovascular: No sharp chest pain, no heart racing, no leg swelling.  Respiratory: as noted in HPI.   Gastrointestinal: No nausea, no vomiting, no diarrhea.   Musculoskeletal: No joint pain, no back pain.   Integumentary: No rashes or sores.  Neurological: No dizziness, no headaches. Sleeping well.  Psychiatric: No mood changes.   Endocrine: No hot flashes, no cold intolerance, weight is stable.  Hematologic: No easy bruising or bleeding.    PHYSICAL EXAM     VITAL SIGNS:   Vitals:    10/23/24 0844   BP: 92/54   Pulse: 51    Temp: 36.3 °C (97.3 °F)   SpO2: 94%       CURRENT WEIGHT:Body mass index is 31.56 kg/m².      PREVIOUS WEIGHTS:  Wt Readings from Last 3 Encounters:   09/20/24 108 kg (239 lb)   09/05/24 110 kg (242 lb)   09/04/24 109 kg (241 lb)       Physical Exam    Constitutional: General appearance: Alert and oriented.  No acute distress. Well developed, well nourished.  Head and face: Symmetric  ENT: external inspection of ear and nose normal. No intranasal polyps. No oropharyngeal exudates.    Oropharynx: normal   Neck: supple, no lymphadenopathy  Pulmonary: Chest is normal. No increased work of breathing or signs of respiratory distress. Clear to auscultation bilaterally - no crackles, wheezing, or rhonchi.   Cardiovascular: Heart rate and rhythm normal. Normal S1, S2 - no murmurs, gallops, or pericardial rub.   Abdomen: Soft, non tender, +BS  Extremities: No edema. No clubbing or cyanosis of the fingernails.    Neurologic: Moves all four extremities   MSK: Normal movements of extremities. Gait normal   Psychiatric: Intact judgement and insight.    RESULTS/DATA     Pulmonary Function Test Results                   Chest Radiograph     XR chest 2 view 09/13/2023    Narrative  Interpreted By:  PRASHANTH GORDON MD  STUDY:  Chest Radiographs;  9/13/2023, 1:29PM.    INDICATION:  Pre op.    COMPARISON:  CXR 4/2/2021.    ACCESSION NUMBER(S):  [id number]    ORDERING CLINICIAN:  CYNTHIA VÁZQUEZ MD    TECHNIQUE:  Frontal and lateral chest.    FINDINGS:    CARDIOMEDIASTINAL SILHOUETTE:  Cardiomediastinal silhouette is normal in size and configuration.    LUNGS:  Calcified granuloma right lung apex. No focal consolidation. No  pleural effusion. No pneumothorax.    ABDOMEN:  No remarkable upper abdominal findings.    BONES:  No acute osseous changes.    Impression  No acute cardiopulmonary abnormality.      Signed by Prashanth Gordon MD      Chest CT Scan     CT Chest 1/2021: Calcified nodule seen suggestive of old healed  granulomatous disease. There is unchanged upper lobe fibrosis and architectural distortion as well as features suggesting chronic bronchitis or smoking related airway disease. No overt bullous disease detected. Enlarged main pulmonary artery suggestive of pulmonary arterial hypertension      Echocardiogram     Echocardiogram     Narrative  [facility]  [id number] [address] [facility], [facility] [address]  Tel [phone] Fax [phone]    TRANSTHORACIC ECHOCARDIOGRAM REPORT      Patient Name:     DENISE PRADO      Remberto Physician:  [id number] Sheela Varela MD  Study Date:       8/15/2023          Referring           SAKSHI PECK  Physician:  MRN/PID:          [id number]           PCP:  Accession/Order#: [id number]          Department          [facility]  Location:  YOB: 1940          Fellow:  Gender:           M                  Nurse:              Pedro Eubanks RN  Admit Date:       8/14/2023          Sonographer:        Ana Lutz [facility]  Admission Status: Inpatient -        Additional Staff:  Priority discharge  Height:           187.00 cm          CC Report to:  Weight:           116.00 kg          Study Type:         Echocardiogram  BSA:              2.40 m2  Blood Pressure: 122 /66 mmHg    Diagnosis/ICD: I63.9-Cerebral Infarction, unspecified  Indication:    Stroke  Procedure/CPT: Echo Complete w Full Doppler-[id number]    Patient History:  Pertinent History: HTN, COPD, A-Fib and Palpitations. Previous echocardiogram  03-.    Study Detail: The following Echo studies were performed: 2D, M-Mode, Doppler and  color flow. Agitated saline used as a contrast agent for  intraseptal flow evaluation.      PHYSICIAN INTERPRETATION:  Left Ventricle: Left ventricular systolic function is normal, with an estimated ejection fraction of 60-65%. There are no regional wall motion abnormalities. The left ventricular cavity size is normal. There is mild concentric left ventricular hypertrophy. Spectral Doppler  shows a pseudonormal pattern of left ventricular diastolic filling. There is no definite left ventricular thrombus visualized. The intraventricular septum appears intact without evidence of shunting or a ventricular septal defect.  Left Atrium: The left atrium is moderately dilated. There is no atrial septal defect present.  Right Ventricle: The right ventricle is upper limits of normal in size. There is low normal right ventricular systolic function.  Right Atrium: The right atrium is mildly dilated.  Aortic Valve: The aortic valve is trileaflet. There is mild aortic valve thickening. There is no evidence of aortic valve stenosis.  There is trivial aortic valve regurgitation. The peak instantaneous gradient of the aortic valve is 9.1 mmHg.  Mitral Valve: The mitral valve is mild to moderately thickened. There is no evidence of mitral valve prolapse. There is no evidence of mitral valve stenosis. The doppler estimated mean and peak diastolic pressure gradients are 1.0 mmHg and 5.9 mmHg respectively. There is mild mitral annular calcification. There is mild to moderate mitral valve regurgitation which is centrally directed.  Tricuspid Valve: The tricuspid valve is structurally normal. There is no evidence of tricuspid valve stenosis. There is trace to mild tricuspid regurgitation. The Doppler estimated RVSP is mildly elevated at 40.2 mmHg.  Pulmonic Valve: The pulmonic valve was not assessed. There is trace pulmonic valve regurgitation.  Pericardium: There is no pericardial effusion noted.  Aorta: The aortic root is abnormal. There is mild dilatation of the ascending aorta. The aortic root is at the upper limits of normal size.  In comparison to the previous echocardiogram(s): Prior examinations are available and were reviewed for comparison purposes. Compared with echo 3/2021 LVEF is unchanged. Mitral and tricuspid valve regurgitation have worsened. There is now bi-atrial enlargement.      CONCLUSIONS:  1. Left  ventricular systolic function is normal with a 60-65% estimated ejection fraction.  2. Intact intraventricular septum without shunting or a ventricular septal defect.  3. No left ventricular thrombus visualized.  4. Spectral Doppler shows a pseudonormal pattern of left ventricular diastolic filling.  5. There is low normal right ventricular systolic function.  6. The left atrium is moderately dilated.  7. Mild to moderate mitral valve regurgitation.  8. No evidence of mitral valve prolapse.  9. There is No tricuspid stenosis.  10. Mildly elevated RVSP.  11. Aortic valve stenosis is not present.  12. Compared with echo 3/2021 LVEF is unchanged. Mitral and tricuspid valve regurgitation have worsened. There is now bi-atrial enlargement.    QUANTITATIVE DATA SUMMARY:  2D MEASUREMENTS:  Normal Ranges:  LAs:           5.50 cm    (2.7-4.0cm)  IVSd:          1.30 cm    (0.6-1.1cm)  LVPWd:         1.28 cm    (0.6-1.1cm)  LVIDd:         5.29 cm    (3.9-5.9cm)  LVIDs:         3.40 cm  LV Mass Index: 117.7 g/m2  LV % FS        35.7 %    LA VOLUME:  Normal Ranges:  LA Vol A4C:        73.6 ml    (22+/-6mL/m2)  LA Vol A2C:        103.8 ml  LA Vol BP:         88.0 ml  LA Vol Index A4C:  30.6ml/m2  LA Vol Index A2C:  43.2 ml/m2  LA Vol Index BP:   36.6 ml/m2  LA Area A4C:       23.9 cm2  LA Area A2C:       28.6 cm2  LA Major Axis A4C: 6.6 cm  LA Major Axis A2C: 6.7 cm  LA Volume Index:   42.1 ml/m2  LA Vol A4C:        71.0 ml  LA Vol A2C:        101.0 ml    M-MODE MEASUREMENTS:  Normal Ranges:  Ao Root: 3.50 cm (2.0-3.7cm)    AORTA MEASUREMENTS:  Normal Ranges:  Ao Sinus, d: 3.40 cm (2.1-3.5cm)  Ao STJ, d:   3.70 cm (1.7-3.4cm)  Asc Ao, d:   4.20 cm (2.1-3.4cm)    LV SYSTOLIC FUNCTION BY 2D PLANIMETRY (MOD):  Normal Ranges:  EF-A4C View: 63.3 % (>=55%)  EF-A2C View: 61.7 %  EF-Biplane:  63.4 %    LV DIASTOLIC FUNCTION:  Normal Ranges:  MV Peak E:        1.04 m/s    (0.7-1.2 m/s)  MV Peak A:        0.21 m/s    (0.42-0.7 m/s)  E/A  Ratio:        5.02        (1.0-2.2)  MV lateral e'     0.06 m/s  MV medial e'      0.04 m/s  E/e' Ratio:       17.50       (<8.0)  PulmV Sys Toro:    23.40 cm/s  PulmV Delvalle Toro:   59.80 cm/s  PulmV A Revs Toro: 22.10 cm/s  PulmV A Revs Dur: 111.00 msec    MITRAL VALVE:  Normal Ranges:  MV Vmax:    1.21 m/s (<=1.3m/s)  MV peak P.9 mmHg (<5mmHg)  MV mean P.0 mmHg (<48mmHg)  MV DT:      172 msec (150-240msec)    AORTIC VALVE:  Normal Ranges:  AoV Vmax:      1.51 m/s (<=1.7m/s)  AoV Peak P.1 mmHg (<20mmHg)  LVOT Max Toro:  0.96 m/s (<=1.1m/s)  LVOT VTI:      20.50 cm  LVOT Diameter: 2.00 cm  (1.8-2.4cm)  AoV Area,Vmax: 1.99 cm2 (2.5-4.5cm2)      RIGHT VENTRICLE:  RV Basal 4.80 cm  RV Mid   3.80 cm  RV Major 7.9 cm  TAPSE:   23.0 mm  RV s'    0.15 m/s    TRICUSPID VALVE/RVSP:  Normal Ranges:  Peak TR Velocity: 3.05 m/s  RV Syst Pressure: 40.2 mmHg (< 30mmHg)    PULMONIC VALVE:  Normal Ranges:  PV Accel Time: 108 msec (>120ms)  PV Max Toro:    1.2 m/s  (0.6-0.9m/s)  PV Max P.2 mmHg    Pulmonary Veins:  PulmV A Revs Dur: 111.00 msec  PulmV A Revs Toro: 22.10 cm/s  PulmV Delvalle Toro:   59.80 cm/s  PulmV Sys Toro:    23.40 cm/s      [id number] Sheela Varela MD  Electronically signed on 8/15/2023 at 5:30:38 PM             ASSESSMENT/PLAN     Mr. Nguyen is a 83 y.o male, with a history of Asthma/COPD overlap, GERD, CAD, DLD, allergic rhinitis and nasal polyps, former cigar smoker, being evaluated for follow up visit for Asthma/COPD overlap.       Problem List and Orders  Problem List Items Addressed This Visit       Asthma - Primary    Relevant Medications    predniSONE (Deltasone) 10 mg tablet       Problem List and Orders  Problem List Items Addressed This Visit       Asthma - Primary    Relevant Medications    predniSONE (Deltasone) 10 mg tablet       Assessment and Plan / Recommendations:  Problem List Items Addressed This Visit        Asthma/COPD overlap  - Continue Symbicort 160mcg 2 puffs twice daily  -  continue albuterol HFA or albuterol nebulizers every 4-6 hours as needed for shortness of breath    Follow up in 3 months or sooner if needed     If you have any questions please call the office [phone]    Thank you for visiting the Pulmonary clinic today!   Zhanna Castillo CNP  [phone]

## 2024-10-04 ENCOUNTER — HOSPITAL ENCOUNTER (OUTPATIENT)
Dept: CARDIOLOGY | Facility: HOSPITAL | Age: 84
Discharge: HOME | End: 2024-10-04
Payer: MEDICARE

## 2024-10-04 DIAGNOSIS — Z95.818 PRESENCE OF OTHER CARDIAC IMPLANTS AND GRAFTS: ICD-10-CM

## 2024-10-04 DIAGNOSIS — I48.0 PAROXYSMAL ATRIAL FIBRILLATION (MULTI): ICD-10-CM

## 2024-10-04 PROCEDURE — 93298 REM INTERROG DEV EVAL SCRMS: CPT

## 2024-10-13 ENCOUNTER — OFFICE VISIT (OUTPATIENT)
Dept: URGENT CARE | Age: 84
End: 2024-10-13
Payer: MEDICARE

## 2024-10-13 VITALS
DIASTOLIC BLOOD PRESSURE: 68 MMHG | RESPIRATION RATE: 18 BRPM | HEART RATE: 55 BPM | OXYGEN SATURATION: 96 % | SYSTOLIC BLOOD PRESSURE: 130 MMHG | TEMPERATURE: 97.7 F

## 2024-10-13 DIAGNOSIS — N30.01 ACUTE CYSTITIS WITH HEMATURIA: ICD-10-CM

## 2024-10-13 DIAGNOSIS — R35.0 URINARY FREQUENCY: ICD-10-CM

## 2024-10-13 DIAGNOSIS — R31.9 HEMATURIA, UNSPECIFIED TYPE: Primary | ICD-10-CM

## 2024-10-13 DIAGNOSIS — N34.3 DYSURIA-FREQUENCY SYNDROME: ICD-10-CM

## 2024-10-13 LAB
POC APPEARANCE, URINE: CLEAR
POC BILIRUBIN, URINE: ABNORMAL
POC BLOOD, URINE: ABNORMAL
POC COLOR, URINE: ABNORMAL
POC GLUCOSE, URINE: NEGATIVE MG/DL
POC KETONES, URINE: NEGATIVE MG/DL
POC LEUKOCYTES, URINE: ABNORMAL
POC NITRITE,URINE: NEGATIVE
POC PH, URINE: 6 PH
POC PROTEIN, URINE: ABNORMAL MG/DL
POC SPECIFIC GRAVITY, URINE: 1.02
POC UROBILINOGEN, URINE: 0.2 EU/DL

## 2024-10-13 PROCEDURE — 87086 URINE CULTURE/COLONY COUNT: CPT

## 2024-10-13 PROCEDURE — 1160F RVW MEDS BY RX/DR IN RCRD: CPT | Performed by: PHYSICIAN ASSISTANT

## 2024-10-13 PROCEDURE — 3075F SYST BP GE 130 - 139MM HG: CPT | Performed by: PHYSICIAN ASSISTANT

## 2024-10-13 PROCEDURE — 81003 URINALYSIS AUTO W/O SCOPE: CPT | Performed by: PHYSICIAN ASSISTANT

## 2024-10-13 PROCEDURE — 99214 OFFICE O/P EST MOD 30 MIN: CPT | Performed by: PHYSICIAN ASSISTANT

## 2024-10-13 PROCEDURE — 1036F TOBACCO NON-USER: CPT | Performed by: PHYSICIAN ASSISTANT

## 2024-10-13 PROCEDURE — 1123F ACP DISCUSS/DSCN MKR DOCD: CPT | Performed by: PHYSICIAN ASSISTANT

## 2024-10-13 PROCEDURE — 1159F MED LIST DOCD IN RCRD: CPT | Performed by: PHYSICIAN ASSISTANT

## 2024-10-13 PROCEDURE — 3078F DIAST BP <80 MM HG: CPT | Performed by: PHYSICIAN ASSISTANT

## 2024-10-13 RX ORDER — CEPHALEXIN 500 MG/1
500 CAPSULE ORAL 2 TIMES DAILY
Qty: 14 CAPSULE | Refills: 0 | Status: SHIPPED | OUTPATIENT
Start: 2024-10-13 | End: 2024-10-20

## 2024-10-13 NOTE — PROGRESS NOTES
Subjective   Patient ID: Juan Carlos Nguyen is a 84 y.o. male. They present today with a chief complaint of Blood in Urine.    CC: Pain with urination    HPI: Patient presenting for dysuria and blood-tinged urine x 1 to 2 days.  Symptoms similar to past episodes of UTI.  Being followed by urology.  No back or flank pain.  No abdominal pain.  No nausea or vomiting.  No fever.  No testicular pain.  No penile lesions, discharge or swelling.    Past Medical History  Allergies as of 10/13/2024 - Reviewed 10/13/2024   Allergen Reaction Noted    Montelukast Other 05/04/2023    Finasteride Itching 05/04/2023    Iodinated contrast media Other 08/03/2020    Sulfamethizole Itching 10/24/2023    Sulfamethoxazole-trimethoprim Rash and Other 08/03/2020       (Not in a hospital admission)         Past Medical History:   Diagnosis Date    Acute conjunctivitis 05/04/2023    Acute cystitis with hematuria 05/04/2023    Acute sinusitis 05/04/2023    Acute suppurative otitis media of right ear without spontaneous rupture of tympanic membrane 05/04/2023    Age-related nuclear cataract, unspecified eye 12/13/2016    Nuclear sclerosis    Benign prostatic hyperplasia without lower urinary tract symptoms 10/14/2014    Prostate enlargement    Body mass index (BMI) 34.0-34.9, adult 12/15/2021    BMI 34.0-34.9,adult    Body mass index (BMI) 35.0-35.9, adult 10/11/2021    Body mass index (BMI) of 35.0 to 35.9    Chest discomfort 05/04/2023    Chronic cough 05/04/2023    Encounter for screening for malignant neoplasm of prostate 04/12/2021    Prostate cancer screening    Exertional dyspnea 05/04/2023    Frequency of urination 05/04/2023    Gross hematuria 05/04/2023    Hematuria 05/04/2023    Long term (current) use of anticoagulants 09/12/2019    Anticoagulant long-term use    Nasal congestion 05/04/2023    Nausea 07/21/2020    Nausea in adult    Personal history of colonic polyps     History of colonic polyps    Personal history of other diseases  of the musculoskeletal system and connective tissue     History of arthritis    Personal history of other diseases of urinary system 07/21/2020    History of hematuria    Persons encountering health services in other specified circumstances 10/11/2021    Encounter to establish care with new doctor    Postsurgical malabsorption, not elsewhere classified     Malnutrition following gastrointestinal surgery    Presbyopia 12/13/2016    Presbyopia OU    Sprain of ankle 11/05/2013    Tibialis tendinitis 04/03/2014    Tremor 05/04/2023    Unspecified astigmatism, bilateral 12/14/2021    Astigmatism of both eyes with presbyopia    Unspecified astigmatism, bilateral 12/13/2016    Astigmatism, bilateral    Wheeze 05/04/2023       Past Surgical History:   Procedure Laterality Date    CHOLECYSTECTOMY  10/14/2014    Cholecystectomy    MR HEAD ANGIO WO IV CONTRAST  8/14/2023    MR HEAD ANGIO WO IV CONTRAST 8/14/2023 [facility]    MR NECK ANGIO WO IV CONTRAST  8/14/2023    MR NECK ANGIO WO IV CONTRAST 8/14/2023 [facility]    OTHER SURGICAL HISTORY  04/30/2014    Left Breast Biopsy During Breast Surgery    OTHER SURGICAL HISTORY  04/11/2022    Loop recorder insertion    OTHER SURGICAL HISTORY  10/11/2021    Loop recorder insertion    OTHER SURGICAL HISTORY  10/11/2021    Tonsillectomy    OTHER SURGICAL HISTORY  10/11/2021    Cardioversion    OTHER SURGICAL HISTORY  06/07/2017    Anesthesia For Cystoscopy    OTHER SURGICAL HISTORY  04/11/2022    Colonoscopy    OTHER SURGICAL HISTORY  04/11/2022    Endoscopy    TONSILLECTOMY  06/07/2017    Tonsillectomy        reports that he has never smoked. He has never used smokeless tobacco. He reports current alcohol use. He reports that he does not use drugs.    Review of Systems  Review of Systems      After reviewing all body systems I have documented pertinent findings above in the history.  All other Systems reviewed and are negative for complaint.  Pertinent positive and negatives are listed in  the above HPI.        Objective    Vitals:    10/13/24 1644   BP: 130/68   Pulse: 55   Resp: 18   Temp: 36.5 °C (97.7 °F)   SpO2: 96%     No LMP for male patient.    Physical Exam    General: Alert, oriented, and cooperative.  No acute distress. Well developed, well nourished.     Skin: Skin is warm, and dry. No rashes or lesions.    Eyes: Sclera and conjunctivae normal     Neck: Supple.     Cardiac: Regular rate and rhythm    Respiratory:  No acute respiratory distress.  Regular rate of breathing.  No accessory muscle use.  No tripoding.      Abdomen: Soft, nontender.  No CVA tenderness.    Procedures    Point of Care Test & Imaging Results from this visit    No results found.    Diagnostic study results (if any) were reviewed by Kishan Escobedo PA-C.    Assessment/Plan   Allergies, medications, history, and pertinent labs/EKGs/Imaging reviewed by Kishan Escobedo PA-C.       MDM:  Patient presenting for UTI type symptoms.    Reports no concerns for STD.  No reported nausea or vomiting. Denied flank and back pain. Denies  lesions, rash, or tenderness.     Patient overall looks well.  Does not appear systemically ill or toxic.  No abdominal tenderness, guarding or rebound. No CVA tenderness.     Urine: Dip: Suspicious for UTI  Urine culture: Pending      No clinical findings to suggest Pyelonephritis or Urinary Stone.    Low concerns for STD    Patient will be empirically treated for UTI.  Advised to take all medications as prescribed. Advised follow-up with PCP for reevaluation. Pt/family instructed to return if symptoms worsen or if new symptoms develop. Patient/family expressed understanding and consented to the above plan. No barriers of communication were apparent and I answered all questions.          Orders and Diagnoses  Diagnoses and all orders for this visit:  Hematuria, unspecified type  -     Urine Culture  -     POCT UA Automated manually resulted  Dysuria-frequency syndrome  -     Urine Culture  -      cephalexin (Keflex) 500 mg capsule; Take 1 capsule (500 mg) by mouth 2 times a day for 7 days.  -     Referral to Urology; Future  Urinary frequency  -     Urine Culture  -     cephalexin (Keflex) 500 mg capsule; Take 1 capsule (500 mg) by mouth 2 times a day for 7 days.  -     Referral to Urology; Future          Patient disposition: Home    Electronically signed by Kishan Escobedo PA-C  4:59 PM

## 2024-10-13 NOTE — PATIENT INSTRUCTIONS
You were seen for urinary symptoms.    Urine dip: Suspicious for UTI (urinary tract infection)    Urine culture: Pending.  You will be notified if your antibiotic needs to be changed based on sensitivity results.    UTI    Plan:  1. Discharge home.  2. Take all medication as directed: Antibiotic.     3. Drink plenty of liquids such as sugar free cranberry juice  4. Follow up with your PCP in 2-3 days to discuss urine culture results and for reevaluation.    If you do not have a primary care physician please call the  appointment hotline in order to establish care. You can make an appointment by calling [phone].    If your urine culture results are negative for bacterial growth, are contaminated, or if you are still having symptoms despite treatment, or if your symptoms are lasting longer than expected; I advise follow up with your PCP to discuss test results and to have a reevaluation in order to rule out any other causes of your symptoms.    5. Return to the UC or ED immediately if new or worsening symptoms develop    Some approaches to preventing urinary tract infections are:  -Drinking more fluids  -Drinking cranberry juice  -Avoid bubble baths  -Encouraging females to wipe with toilet paper from front to back.      Go to the emergency department if you have any new or worsening symptoms.  -Worsening nausea and vomiting  -The inability to urinate  -Vaginal discharge, irritation or lesions  -Fever that lasts more than 2 days while taking an antibiotic  -Rash or swelling following an antibiotic  -Worsening back pain, fever, chills

## 2024-10-14 LAB — BACTERIA UR CULT: NORMAL

## 2024-10-18 ENCOUNTER — APPOINTMENT (OUTPATIENT)
Dept: PULMONOLOGY | Facility: CLINIC | Age: 84
End: 2024-10-18
Payer: MEDICARE

## 2024-10-18 ENCOUNTER — OFFICE VISIT (OUTPATIENT)
Dept: URGENT CARE | Age: 84
End: 2024-10-18

## 2024-10-18 VITALS
SYSTOLIC BLOOD PRESSURE: 110 MMHG | RESPIRATION RATE: 16 BRPM | DIASTOLIC BLOOD PRESSURE: 57 MMHG | OXYGEN SATURATION: 98 % | HEART RATE: 60 BPM | TEMPERATURE: 97.9 F

## 2024-10-18 DIAGNOSIS — R31.0 GROSS HEMATURIA: Primary | ICD-10-CM

## 2024-10-18 LAB
POC BILIRUBIN, URINE: NEGATIVE
POC BLOOD, URINE: ABNORMAL
POC GLUCOSE, URINE: NEGATIVE MG/DL
POC KETONES, URINE: NEGATIVE MG/DL
POC LEUKOCYTES, URINE: ABNORMAL
POC NITRITE,URINE: NEGATIVE
POC PH, URINE: 5.5 PH
POC PROTEIN, URINE: ABNORMAL MG/DL
POC SPECIFIC GRAVITY, URINE: 1.02
POC UROBILINOGEN, URINE: 0.2 EU/DL

## 2024-10-18 PROCEDURE — 87186 SC STD MICRODIL/AGAR DIL: CPT

## 2024-10-18 PROCEDURE — 87086 URINE CULTURE/COLONY COUNT: CPT

## 2024-10-18 NOTE — PROGRESS NOTES
Subjective   Patient ID: Juan Carlos Nguyen is a 84 y.o. male. They present today with a chief complaint of UTI?    Patient disposition: Home    HISTORY OF PRESENT ILLNESS:    This is an older adult male with PMH UTIs and renal stones. He was seen here by a different provider 5d ago for UTI sx and gross hematuria. His culture showed no growth. He was Rxd Keflex then dcd this after negative culture. Returns today unsure how to proceed since burning and bloody appearance of urine and frequency worsening. Denies f/c/s, weakness, n/v, confusion, flank or abd pain. He has large renal stones in both kidneys per pt. He sees a urologist.    Past Medical History  Allergies as of 10/18/2024 - Reviewed 10/13/2024   Allergen Reaction Noted    Montelukast Other 05/04/2023    Finasteride Itching 05/04/2023    Iodinated contrast media Other 08/03/2020    Sulfamethizole Itching 10/24/2023    Sulfamethoxazole-trimethoprim Rash and Other 08/03/2020       (Not in a hospital admission)       Past Medical History:   Diagnosis Date    Acute conjunctivitis 05/04/2023    Acute cystitis with hematuria 05/04/2023    Acute sinusitis 05/04/2023    Acute suppurative otitis media of right ear without spontaneous rupture of tympanic membrane 05/04/2023    Age-related nuclear cataract, unspecified eye 12/13/2016    Nuclear sclerosis    Benign prostatic hyperplasia without lower urinary tract symptoms 10/14/2014    Prostate enlargement    Body mass index (BMI) 34.0-34.9, adult 12/15/2021    BMI 34.0-34.9,adult    Body mass index (BMI) 35.0-35.9, adult 10/11/2021    Body mass index (BMI) of 35.0 to 35.9    Chest discomfort 05/04/2023    Chronic cough 05/04/2023    Encounter for screening for malignant neoplasm of prostate 04/12/2021    Prostate cancer screening    Exertional dyspnea 05/04/2023    Frequency of urination 05/04/2023    Gross hematuria 05/04/2023    Hematuria 05/04/2023    Long term (current) use of anticoagulants 09/12/2019     Anticoagulant long-term use    Nasal congestion 05/04/2023    Nausea 07/21/2020    Nausea in adult    Personal history of colonic polyps     History of colonic polyps    Personal history of other diseases of the musculoskeletal system and connective tissue     History of arthritis    Personal history of other diseases of urinary system 07/21/2020    History of hematuria    Persons encountering health services in other specified circumstances 10/11/2021    Encounter to establish care with new doctor    Postsurgical malabsorption, not elsewhere classified     Malnutrition following gastrointestinal surgery    Presbyopia 12/13/2016    Presbyopia OU    Sprain of ankle 11/05/2013    Tibialis tendinitis 04/03/2014    Tremor 05/04/2023    Unspecified astigmatism, bilateral 12/14/2021    Astigmatism of both eyes with presbyopia    Unspecified astigmatism, bilateral 12/13/2016    Astigmatism, bilateral    Wheeze 05/04/2023       Past Surgical History:   Procedure Laterality Date    CHOLECYSTECTOMY  10/14/2014    Cholecystectomy    MR HEAD ANGIO WO IV CONTRAST  8/14/2023    MR HEAD ANGIO WO IV CONTRAST 8/14/2023 [facility]    MR NECK ANGIO WO IV CONTRAST  8/14/2023    MR NECK ANGIO WO IV CONTRAST 8/14/2023 [facility]    OTHER SURGICAL HISTORY  04/30/2014    Left Breast Biopsy During Breast Surgery    OTHER SURGICAL HISTORY  04/11/2022    Loop recorder insertion    OTHER SURGICAL HISTORY  10/11/2021    Loop recorder insertion    OTHER SURGICAL HISTORY  10/11/2021    Tonsillectomy    OTHER SURGICAL HISTORY  10/11/2021    Cardioversion    OTHER SURGICAL HISTORY  06/07/2017    Anesthesia For Cystoscopy    OTHER SURGICAL HISTORY  04/11/2022    Colonoscopy    OTHER SURGICAL HISTORY  04/11/2022    Endoscopy    TONSILLECTOMY  06/07/2017    Tonsillectomy        reports that he has never smoked. He has never used smokeless tobacco. He reports current alcohol use. He reports that he does not use drugs.    Review of Systems    Negative except  as documented in the History of Present Illness.                             Objective    Vitals:    10/18/24 1621   BP: 110/57   Pulse: 60   Resp: 16   Temp: 36.6 °C (97.9 °F)   SpO2: 98%     No LMP for male patient.      PHYSICAL EXAMINATION:    CONSTITUTIONAL: well-appearing, nontoxic, ambulatory         EYES:   No scleral icterus or orbital trauma noted. No conjunctival injection. PERRLA. No nystagmus.         ENT:  Head and face are unremarkable and atraumatic. Mucous membranes moist. Nares are patent without copious rhinorrhea.         LUNGS:  CTAB, no r/r/w.         CARDIOVASCULAR:   RRR, no m/r/g. Nl S1/S2.         ABDOMEN:  Nontender, nondistended, with no obvious masses, and no peritoneal signs.         :  CVAT negative bilaterally.         MUSCULOSKELETAL: No obvious deformities. SIMMONS with equal strength. Gait [observed to be normal.]         SKIN:   Good color, with no significant rashes, pallor, cyanosis, wounds.         NEURO:  Normal baseline mental status. No obvious neurological deficits, normal sensation and strength bilaterally.         PSYCH: Appropriate mood and affect.         -----------------------------------         MDM: Pt was well on exam. As noted his culture was negative taken just 5d ago for same sx. New culture sent today but I explained to pt that his sx may all be from blood. Will see his urologist about this, and appt already made.        Procedures    Diagnostic study results (if any) were reviewed by Francisco Collado PA-C.    Results for orders placed or performed in visit on 10/18/24   POCT UA Automated manually resulted   Result Value Ref Range    POC Glucose, Urine NEGATIVE NEGATIVE mg/dl    POC Bilirubin, Urine NEGATIVE NEGATIVE    POC Ketones, Urine NEGATIVE NEGATIVE mg/dl    POC Specific Gravity, Urine 1.020 1.005 - 1.035    POC Blood, Urine LARGE (3+) (A) NEGATIVE    POC PH, Urine 5.5 No Reference Range Established PH    POC Protein, Urine 100 (2+) (A) NEGATIVE, 30 (1+)  mg/dl    POC Urobilinogen, Urine 0.2 0.2, 1.0 EU/DL    Poc Nitrite, Urine NEGATIVE NEGATIVE    POC Leukocytes, Urine TRACE (A) NEGATIVE        Assessment/Plan   Allergies, medications, history, and pertinent labs/EKGs/Imaging reviewed by Francisco Collado PA-C.     Orders and Diagnoses  Diagnoses and all orders for this visit:  Hematuria, unspecified type  -     POCT UA Automated manually resulted      Medical Admin Record      Follow Up Instructions  No follow-ups on file.    Electronically signed by Francisco Collado PA-C  5:02 PM

## 2024-10-20 LAB — BACTERIA UR CULT: ABNORMAL

## 2024-10-21 ENCOUNTER — APPOINTMENT (OUTPATIENT)
Dept: CARDIOLOGY | Facility: CLINIC | Age: 84
End: 2024-10-21
Payer: MEDICARE

## 2024-10-21 VITALS
DIASTOLIC BLOOD PRESSURE: 58 MMHG | HEART RATE: 70 BPM | HEIGHT: 73 IN | BODY MASS INDEX: 31.68 KG/M2 | WEIGHT: 239 LBS | SYSTOLIC BLOOD PRESSURE: 110 MMHG

## 2024-10-21 DIAGNOSIS — Z71.89 ENCOUNTER FOR MEDICATION REVIEW AND COUNSELING: ICD-10-CM

## 2024-10-21 DIAGNOSIS — Z87.891 FORMER SMOKER: ICD-10-CM

## 2024-10-21 DIAGNOSIS — Z95.818 IMPLANTABLE LOOP RECORDER PRESENT: ICD-10-CM

## 2024-10-21 DIAGNOSIS — I48.0 PAROXYSMAL ATRIAL FIBRILLATION (MULTI): ICD-10-CM

## 2024-10-21 DIAGNOSIS — Z95.818 IMPLANTABLE LOOP RECORDER PRESENT: Primary | ICD-10-CM

## 2024-10-21 DIAGNOSIS — Z71.89 ENCOUNTER TO DISCUSS TREATMENT OPTIONS: ICD-10-CM

## 2024-10-21 DIAGNOSIS — I25.2 HISTORY OF ST ELEVATION MYOCARDIAL INFARCTION (STEMI): ICD-10-CM

## 2024-10-21 DIAGNOSIS — I49.3 FREQUENT PVCS: ICD-10-CM

## 2024-10-21 LAB — BACTERIA UR CULT: ABNORMAL

## 2024-10-21 PROCEDURE — 1036F TOBACCO NON-USER: CPT | Performed by: INTERNAL MEDICINE

## 2024-10-21 PROCEDURE — 93000 ELECTROCARDIOGRAM COMPLETE: CPT | Mod: DISTINCT PROCEDURAL SERVICE | Performed by: INTERNAL MEDICINE

## 2024-10-21 PROCEDURE — 93291 INTERROG DEV EVAL SCRMS IP: CPT | Performed by: INTERNAL MEDICINE

## 2024-10-21 PROCEDURE — 1123F ACP DISCUSS/DSCN MKR DOCD: CPT | Performed by: INTERNAL MEDICINE

## 2024-10-21 PROCEDURE — 3078F DIAST BP <80 MM HG: CPT | Performed by: INTERNAL MEDICINE

## 2024-10-21 PROCEDURE — 99213 OFFICE O/P EST LOW 20 MIN: CPT | Performed by: INTERNAL MEDICINE

## 2024-10-21 PROCEDURE — 3074F SYST BP LT 130 MM HG: CPT | Performed by: INTERNAL MEDICINE

## 2024-10-21 ASSESSMENT — ENCOUNTER SYMPTOMS
PALPITATIONS: 0
DYSPNEA ON EXERTION: 0

## 2024-10-23 ENCOUNTER — OFFICE VISIT (OUTPATIENT)
Dept: PULMONOLOGY | Facility: CLINIC | Age: 84
End: 2024-10-23
Payer: MEDICARE

## 2024-10-23 DIAGNOSIS — J45.41 MODERATE PERSISTENT ASTHMA WITH ACUTE EXACERBATION (HHS-HCC): Primary | ICD-10-CM

## 2024-10-25 DIAGNOSIS — K21.9 GASTROESOPHAGEAL REFLUX DISEASE WITHOUT ESOPHAGITIS: ICD-10-CM

## 2024-10-25 RX ORDER — FAMOTIDINE 40 MG/1
40 TABLET, FILM COATED ORAL 2 TIMES DAILY
Qty: 180 TABLET | Refills: 0 | Status: SHIPPED | OUTPATIENT
Start: 2024-10-25

## 2024-10-30 DIAGNOSIS — I27.20 PULMONARY HYPERTENSION (MULTI): ICD-10-CM

## 2024-10-30 RX ORDER — VALSARTAN 80 MG/1
80 TABLET ORAL DAILY
Qty: 30 TABLET | Refills: 0 | Status: SHIPPED | OUTPATIENT
Start: 2024-10-30

## 2024-11-08 DIAGNOSIS — K21.9 GASTROESOPHAGEAL REFLUX DISEASE WITHOUT ESOPHAGITIS: ICD-10-CM

## 2024-11-08 DIAGNOSIS — N40.0 BENIGN PROSTATIC HYPERPLASIA WITHOUT LOWER URINARY TRACT SYMPTOMS: ICD-10-CM

## 2024-11-08 DIAGNOSIS — I48.0 PAROXYSMAL ATRIAL FIBRILLATION (MULTI): ICD-10-CM

## 2024-11-08 RX ORDER — PANTOPRAZOLE SODIUM 40 MG/1
TABLET, DELAYED RELEASE ORAL
Qty: 90 TABLET | Refills: 0 | Status: SHIPPED | OUTPATIENT
Start: 2024-11-08

## 2024-11-08 RX ORDER — SOTALOL HYDROCHLORIDE 80 MG/1
TABLET ORAL
Qty: 180 TABLET | Refills: 0 | Status: SHIPPED | OUTPATIENT
Start: 2024-11-08

## 2024-11-08 RX ORDER — TERAZOSIN 10 MG/1
CAPSULE ORAL
Qty: 90 CAPSULE | Refills: 0 | Status: SHIPPED | OUTPATIENT
Start: 2024-11-08

## 2024-11-13 ENCOUNTER — APPOINTMENT (OUTPATIENT)
Dept: PRIMARY CARE | Facility: CLINIC | Age: 84
End: 2024-11-13
Payer: MEDICARE

## 2024-11-13 VITALS
WEIGHT: 240 LBS | HEIGHT: 73 IN | DIASTOLIC BLOOD PRESSURE: 70 MMHG | RESPIRATION RATE: 16 BRPM | TEMPERATURE: 97.3 F | BODY MASS INDEX: 31.81 KG/M2 | SYSTOLIC BLOOD PRESSURE: 118 MMHG | HEART RATE: 54 BPM

## 2024-11-13 DIAGNOSIS — N40.0 BENIGN PROSTATIC HYPERPLASIA WITHOUT LOWER URINARY TRACT SYMPTOMS: ICD-10-CM

## 2024-11-13 DIAGNOSIS — M17.12 PRIMARY OSTEOARTHRITIS OF LEFT KNEE: ICD-10-CM

## 2024-11-13 DIAGNOSIS — R31.0 GROSS HEMATURIA: ICD-10-CM

## 2024-11-13 DIAGNOSIS — E78.2 MIXED HYPERLIPIDEMIA: ICD-10-CM

## 2024-11-13 DIAGNOSIS — K21.9 GASTROESOPHAGEAL REFLUX DISEASE WITHOUT ESOPHAGITIS: ICD-10-CM

## 2024-11-13 DIAGNOSIS — G47.33 OBSTRUCTIVE SLEEP APNEA: Primary | ICD-10-CM

## 2024-11-13 DIAGNOSIS — G25.0 ESSENTIAL TREMOR: ICD-10-CM

## 2024-11-13 DIAGNOSIS — I48.0 PAROXYSMAL ATRIAL FIBRILLATION (MULTI): ICD-10-CM

## 2024-11-13 DIAGNOSIS — I10 ESSENTIAL HYPERTENSION, BENIGN: ICD-10-CM

## 2024-11-13 LAB
POC APPEARANCE, URINE: ABNORMAL
POC BILIRUBIN, URINE: NEGATIVE
POC BLOOD, URINE: ABNORMAL
POC COLOR, URINE: ABNORMAL
POC GLUCOSE, URINE: NEGATIVE MG/DL
POC KETONES, URINE: NEGATIVE MG/DL
POC LEUKOCYTES, URINE: NEGATIVE
POC NITRITE,URINE: NEGATIVE
POC PH, URINE: 6 PH
POC PROTEIN, URINE: NEGATIVE MG/DL
POC SPECIFIC GRAVITY, URINE: >=1.03
POC UROBILINOGEN, URINE: 0.2 EU/DL

## 2024-11-13 PROCEDURE — 1158F ADVNC CARE PLAN TLK DOCD: CPT | Performed by: FAMILY MEDICINE

## 2024-11-13 PROCEDURE — 81002 URINALYSIS NONAUTO W/O SCOPE: CPT | Performed by: FAMILY MEDICINE

## 2024-11-13 PROCEDURE — 1036F TOBACCO NON-USER: CPT | Performed by: FAMILY MEDICINE

## 2024-11-13 PROCEDURE — 1160F RVW MEDS BY RX/DR IN RCRD: CPT | Performed by: FAMILY MEDICINE

## 2024-11-13 PROCEDURE — 1123F ACP DISCUSS/DSCN MKR DOCD: CPT | Performed by: FAMILY MEDICINE

## 2024-11-13 PROCEDURE — 3078F DIAST BP <80 MM HG: CPT | Performed by: FAMILY MEDICINE

## 2024-11-13 PROCEDURE — 3074F SYST BP LT 130 MM HG: CPT | Performed by: FAMILY MEDICINE

## 2024-11-13 PROCEDURE — G2211 COMPLEX E/M VISIT ADD ON: HCPCS | Performed by: FAMILY MEDICINE

## 2024-11-13 PROCEDURE — 99214 OFFICE O/P EST MOD 30 MIN: CPT | Performed by: FAMILY MEDICINE

## 2024-11-13 PROCEDURE — 87086 URINE CULTURE/COLONY COUNT: CPT

## 2024-11-13 PROCEDURE — 1159F MED LIST DOCD IN RCRD: CPT | Performed by: FAMILY MEDICINE

## 2024-11-13 RX ORDER — PANTOPRAZOLE SODIUM 40 MG/1
40 TABLET, DELAYED RELEASE ORAL
Qty: 90 TABLET | Refills: 1 | Status: CANCELLED | OUTPATIENT
Start: 2024-11-13

## 2024-11-13 RX ORDER — SOTALOL HYDROCHLORIDE 80 MG/1
80 TABLET ORAL 2 TIMES DAILY
Qty: 180 TABLET | Refills: 1 | Status: CANCELLED | OUTPATIENT
Start: 2024-11-13

## 2024-11-13 RX ORDER — ATORVASTATIN CALCIUM 80 MG/1
80 TABLET, FILM COATED ORAL DAILY
Qty: 90 TABLET | Refills: 1 | Status: CANCELLED | OUTPATIENT
Start: 2024-11-13

## 2024-11-13 RX ORDER — VALSARTAN 80 MG/1
80 TABLET ORAL DAILY
Qty: 90 TABLET | Refills: 1 | Status: CANCELLED | OUTPATIENT
Start: 2024-11-13

## 2024-11-13 RX ORDER — URSODIOL 300 MG/1
300 CAPSULE ORAL 2 TIMES DAILY
Qty: 180 CAPSULE | Refills: 1 | Status: CANCELLED | OUTPATIENT
Start: 2024-11-13

## 2024-11-13 RX ORDER — FAMOTIDINE 40 MG/1
40 TABLET, FILM COATED ORAL 2 TIMES DAILY
Qty: 180 TABLET | Refills: 1 | Status: CANCELLED | OUTPATIENT
Start: 2024-11-13

## 2024-11-13 RX ORDER — TERAZOSIN 10 MG/1
10 CAPSULE ORAL ONCE
Qty: 90 CAPSULE | Refills: 1 | Status: CANCELLED | OUTPATIENT
Start: 2024-11-13 | End: 2024-11-13

## 2024-11-13 RX ORDER — PROPRANOLOL HYDROCHLORIDE 20 MG/1
20 TABLET ORAL 2 TIMES DAILY
Qty: 90 TABLET | Refills: 1 | Status: CANCELLED | OUTPATIENT
Start: 2024-11-13

## 2024-11-13 RX ORDER — TERAZOSIN 5 MG/1
5 CAPSULE ORAL DAILY
Qty: 90 CAPSULE | Refills: 1 | Status: CANCELLED | OUTPATIENT
Start: 2024-11-13

## 2024-11-13 NOTE — ASSESSMENT & PLAN NOTE
Patient reports no refills are needed today.  Condition seems well controlled.  No change in current treatment.

## 2024-11-13 NOTE — ASSESSMENT & PLAN NOTE
Patient reports no refills are needed today.  Appropriate labs ordered or reviewed.  No change in current treatment regimen.  Make a follow up appointment with me for recheck in 6 months.  Orders:    CBC; Future    Lipid Panel; Future    Comprehensive Metabolic Panel; Future

## 2024-11-13 NOTE — ASSESSMENT & PLAN NOTE
We do not have time for a injection today but I will set him up for an appointment in about 1 week where we can do a corticosteroid injection to the left knee.

## 2024-11-13 NOTE — PROGRESS NOTES
Juan Carlos Nguyen is a 84 y.o. male here today for   Chief Complaint   Patient presents with    Hyperlipidemia    Hypertension    Atrial Fibrillation    GERD    Benign Prostatic Hypertrophy        HPI   He does not need RF's today.      HTN recheck -- Patient denies chest pain, SOB, edema, palpitations on review.  Taking medication correctly and denies any side effects.    HLD recheck -- Patient taking medications correctly.  No SE's of muscle pain or joint pain.  No CP, edema, myalgias.    GERD recheck -- Patient reports GI symptoms are well controlled with current treatment.  No heartburn, chest pain, vomiting, diarrhea.  No SE's from current treatment.  Patient wishes to continue the same treatment.   GERD sxs much improved with Pepcid.      BPH Recheck --this is monitored by his urologist.    Recheck atrial fibrillation -- managed by Dr. Hopkins and miri.      Recheck essential tremor -- stable with propanolol.      He has noticed some blood in urine since 2 months ago.  Went to  and urine culture grew pseudomonas - was put on Keflex.  Then saw Dr. Laura for evaluation.   Had renal and bladder US with Valentín and was normal.  PSA was 0.38 in May 2024.  Still with some blood in urine - orange.  Minor burning with urination.   He has a h/o benign bladder mass removal years ago.      Left knee OA - He would like cortisone injection.  He has a long history of arthritis in this knee and received a cortisone injection from his orthopedist a few years ago and it helped a lot.  He says it has been getting more stiff and painful over the last 4 months and he asks if I can do a cortisone injection in the future.    Current Outpatient Medications:     albuterol 2.5 mg /3 mL (0.083 %) nebulizer solution, Take 3 mL (2.5 mg) by nebulization every 6 hours if needed for wheezing., Disp: 75 mL, Rfl: 3    albuterol 90 mcg/actuation inhaler, Inhale 1 puff every 4 hours if needed for wheezing., Disp: 18 g, Rfl: 6    aspirin 81 mg EC  tablet, , Disp: , Rfl:     atorvastatin (Lipitor) 80 mg tablet, TAKE ONE TABLET BY MOUTH EVERY DAY, Disp: 90 tablet, Rfl: 1    budesonide-formoteroL (Symbicort) 160-4.5 mcg/actuation inhaler, Inhale 2 puffs 2 times a day. Rinse mouth with water after use to reduce aftertaste and incidence of candidiasis. Do not swallow., Disp: 10.2 g, Rfl: 3    calcium 500 mg calcium (1,250 mg) tablet, Take 1 tablet (1,250 mg) by mouth once daily., Disp: , Rfl:     cholecalciferol (Vitamin D3) 5,000 Units tablet, Take by mouth once daily., Disp: , Rfl:     dutasteride (Avodart) 0.5 mg capsule, Take 1 capsule (0.5 mg) by mouth once daily., Disp: , Rfl:     Eliquis 5 mg tablet, TAKE ONE TABLET BY MOUTH TWO TIMES A DAY, Disp: 180 tablet, Rfl: 0    famotidine (Pepcid) 40 mg tablet, TAKE ONE TABLET BY MOUTH TWO TIMES A DAY, Disp: 180 tablet, Rfl: 0    guaiFENesin (Mucinex) 1,200 mg tablet extended release 12hr, Take 1 tablet (1,200 mg) by mouth every 12 hours if needed., Disp: , Rfl:     loratadine (Claritin) 10 mg tablet, Take 1 tablet (10 mg) by mouth once daily., Disp: , Rfl:     omega 3-dha-epa-fish oil (Fish OiL) 1,200 (144-216) mg capsule, Take by mouth., Disp: , Rfl:     pantoprazole (ProtoNix) 40 mg EC tablet, TAKE ONE TABLET (40 mg) BY MOUTH DAILY. do not crush, chew, or split, Disp: 90 tablet, Rfl: 0    propranolol (Inderal) 20 mg tablet, TAKE ONE TABLET BY MOUTH TWO TIMES A DAY, Disp: 90 tablet, Rfl: 0    sotalol (Betapace) 80 mg tablet, TAKE ONE TABLET (80 mg) BY MOUTH EVERY 12 HOURS, Disp: 180 tablet, Rfl: 0    terazosin (Hytrin) 10 mg capsule, TAKE ONE CAPSULE (10 mg) BY MOUTH DAILY AT BEDTIME, Disp: 90 capsule, Rfl: 0    ursodiol (Actigall) 300 mg capsule, TAKE ONE CAPSULE BY MOUTH TWO TIMES A DAY, Disp: 180 capsule, Rfl: 1    valsartan (Diovan) 80 mg tablet, TAKE ONE TABLET BY MOUTH DAILY for blood pressure, Disp: 30 tablet, Rfl: 0    zinc gluconate 50 mg tablet, Take by mouth., Disp: , Rfl:     budesonide-formoteroL  (Symbicort) 80-4.5 mcg/actuation inhaler, Inhale 2 puffs 2 times a day. Rinse mouth with water after use to reduce aftertaste and incidence of candidiasis. Do not swallow., Disp: 30 each, Rfl: 6    Patient Active Problem List   Diagnosis    Actinic keratosis    Allergic rhinitis    Anxiety    Ventricular ectopy    Asthma    Basal cell carcinoma    Essential hypertension, benign    BPH (benign prostatic hyperplasia)    Choledocholithiasis    Chronic obstructive pulmonary disease (Multi)    Dysuria-frequency syndrome    Essential tremor    Mastodynia    Lung nodules    Lump or mass in breast    Mixed hyperlipidemia    History of ST elevation myocardial infarction (STEMI)    Gross hematuria    Gastroesophageal reflux disease without esophagitis    Frequent PVCs    MGD (meibomian gland dysfunction)    Mild CAD    Nuclear sclerosis of both eyes    Class 1 obesity    Obstructive sleep apnea    Osteoarthritis of knee    PAC (premature atrial contraction)    Paroxysmal atrial fibrillation (Multi)    Pulmonary hypertension (Multi)    PVD (posterior vitreous detachment), both eyes    Recurrent nephrolithiasis    Retained gallstones following laparoscopic cholecystectomy    Seborrheic keratosis, inflamed    Sinus bradycardia    Intention tremor    Trichiasis of right eyelid    Venous lake of lip    COPD (chronic obstructive pulmonary disease) (Multi)    Bilateral presbyopia    Bilateral sensorineural hearing loss    Nasal polyps    Stroke-like symptoms    Implantable loop recorder present    Localized, primary osteoarthritis    TIA (transient ischemic attack)    Anticoagulant long-term use    Chronic sinusitis    ABE (obstructive sleep apnea)    BMI 33.0-33.9,adult    Former smoker    Class 1 obesity due to excess calories with serious comorbidity and body mass index (BMI) of 34.0 to 34.9 in adult    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, unspecified etiology (Multi)    Anisometropia    BMI  31.0-31.9,adult    Encounter for medication review and counseling    Encounter to discuss treatment options         Recent Results (from the past 4 weeks)   POCT UA Automated manually resulted    Collection Time: 10/18/24  4:29 PM   Result Value Ref Range    POC Glucose, Urine NEGATIVE NEGATIVE mg/dl    POC Bilirubin, Urine NEGATIVE NEGATIVE    POC Ketones, Urine NEGATIVE NEGATIVE mg/dl    POC Specific Gravity, Urine 1.020 1.005 - 1.035    POC Blood, Urine LARGE (3+) (A) NEGATIVE    POC PH, Urine 5.5 No Reference Range Established PH    POC Protein, Urine 100 (2+) (A) NEGATIVE, 30 (1+) mg/dl    POC Urobilinogen, Urine 0.2 0.2, 1.0 EU/DL    Poc Nitrite, Urine NEGATIVE NEGATIVE    POC Leukocytes, Urine TRACE (A) NEGATIVE   Urine Culture    Collection Time: 10/18/24  5:04 PM    Specimen: Clean Catch/Voided; Urine   Result Value Ref Range    Urine Culture 20,000 - 80,000 Pseudomonas aeruginosa (A)        Susceptibility    Pseudomonas aeruginosa - MICROSCAN     Ceftazidime  Susceptible ug/ml     Cefepime  Susceptible ug/ml     Aztreonam  Susceptible ug/ml     Ciprofloxacin  Susceptible ug/ml     Tobramycin  Susceptible ug/ml     Piperacillin/Tazobactam  Susceptible ug/ml   POCT UA (nonautomated) manually resulted    Collection Time: 11/13/24 11:03 AM   Result Value Ref Range    POC Color, Urine Niharika (A) Straw, Yellow, Light-Yellow    POC Appearance, Urine Cloudy (A) Clear    POC Glucose, Urine NEGATIVE NEGATIVE mg/dl    POC Bilirubin, Urine NEGATIVE NEGATIVE    POC Ketones, Urine NEGATIVE NEGATIVE mg/dl    POC Specific Gravity, Urine >=1.030 1.005 - 1.035    POC Blood, Urine MODERATE (2+) (A) NEGATIVE    POC PH, Urine 6.0 No Reference Range Established PH    POC Protein, Urine NEGATIVE NEGATIVE, 30 (1+) mg/dl    POC Urobilinogen, Urine 0.2 0.2, 1.0 EU/DL    Poc Nitrite, Urine NEGATIVE NEGATIVE    POC Leukocytes, Urine NEGATIVE NEGATIVE        Objective    Visit Vitals    Visit Vitals  /70   Pulse 54   Temp 36.3 °C  "(97.3 °F)   Resp 16   Ht 1.854 m (6' 1\")   Wt 109 kg (240 lb)   BMI 31.66 kg/m²   Smoking Status Never   BSA 2.37 m²       Body mass index is 31.66 kg/m².     Physical Exam     General - Not in acute distress and cooperative.  Build & Nutrition - Well developed  Posture - Normal  Gait - Normal  Mental Status - alert and oriented x 3    Head - Normocephalic    Neck - Thyroid normal size    Eyes - Bilateral - Sclera clear and lids pink without edema or mass.      Skin - Warm and dry with no rashes on visible skin    Lungs - Clear to auscultation and normal breathing effort    Cardiovascular - RRR and no murmurs, rubs or thrill.    Peripheral Vascular - Bilateral - no edema present    Neuropsychiatric - normal mood and affect        Assessment & Plan  Gastroesophageal reflux disease without esophagitis  Patient reports no refills are needed today.  Condition seems well controlled.  No change in current treatment.       Paroxysmal atrial fibrillation (Multi)  This is stable and managed by his electrophysiologist.  No change in current treatment regimen.       Benign prostatic hyperplasia without lower urinary tract symptoms  Patient reports no refills are needed today.  Condition seems well controlled.  No change in current treatment.       Obstructive sleep apnea         Mixed hyperlipidemia  Patient reports no refills are needed today.  Appropriate labs ordered or reviewed.  No change in current treatment regimen.  Orders:    CBC; Future    Lipid Panel; Future    Comprehensive Metabolic Panel; Future    Essential hypertension, benign  Patient reports no refills are needed today.  Appropriate labs ordered or reviewed.  No change in current treatment regimen.  Make a follow up appointment with me for recheck in 6 months.  Orders:    CBC; Future    Lipid Panel; Future    Comprehensive Metabolic Panel; Future    Essential tremor  Patient reports no refills are needed today.  Condition seems well controlled.  No change in " current treatment.       Primary osteoarthritis of left knee  We do not have time for a injection today but I will set him up for an appointment in about 1 week where we can do a corticosteroid injection to the left knee.       Gross hematuria  Patient's urinalysis today in the office was again positive for 2+ blood but negative for leukocytes and nitrites.  I will send it for culture.  Since he has had gross hematuria for 2 months I recommend that he call and make a follow-up with his urologist Dr. Laura.  We discussed possible etiologies for continued hematuria including bladder polyps tumors and even cancer.    Orders:    POCT UA (nonautomated) manually resulted    Urine Culture; Future    Urine Culture               Orders Placed This Encounter   Procedures    Urine Culture    CBC    Lipid Panel    Comprehensive Metabolic Panel    POCT UA (nonautomated) manually resulted        No orders of the defined types were placed in this encounter.

## 2024-11-13 NOTE — ASSESSMENT & PLAN NOTE
Patient's urinalysis today in the office was again positive for 2+ blood but negative for leukocytes and nitrites.  I will send it for culture.  Since he has had gross hematuria for 2 months I recommend that he call and make a follow-up with his urologist Dr. Laura.  We discussed possible etiologies for continued hematuria including bladder polyps tumors and even cancer.    Orders:    POCT UA (nonautomated) manually resulted    Urine Culture; Future    Urine Culture

## 2024-11-13 NOTE — ASSESSMENT & PLAN NOTE
This is stable and managed by his electrophysiologist.  No change in current treatment regimen.

## 2024-11-13 NOTE — ASSESSMENT & PLAN NOTE
Patient reports no refills are needed today.  Appropriate labs ordered or reviewed.  No change in current treatment regimen.  Orders:    CBC; Future    Lipid Panel; Future    Comprehensive Metabolic Panel; Future

## 2024-11-15 ENCOUNTER — TELEPHONE (OUTPATIENT)
Dept: PRIMARY CARE | Facility: CLINIC | Age: 84
End: 2024-11-15
Payer: MEDICARE

## 2024-11-15 ENCOUNTER — LAB (OUTPATIENT)
Dept: LAB | Facility: LAB | Age: 84
End: 2024-11-15
Payer: MEDICARE

## 2024-11-15 DIAGNOSIS — I10 ESSENTIAL HYPERTENSION, BENIGN: ICD-10-CM

## 2024-11-15 DIAGNOSIS — E78.2 MIXED HYPERLIPIDEMIA: ICD-10-CM

## 2024-11-15 LAB
ALBUMIN SERPL BCP-MCNC: 3.7 G/DL (ref 3.4–5)
ALP SERPL-CCNC: 51 U/L (ref 33–136)
ALT SERPL W P-5'-P-CCNC: 12 U/L (ref 10–52)
ANION GAP SERPL CALC-SCNC: 13 MMOL/L (ref 10–20)
AST SERPL W P-5'-P-CCNC: 17 U/L (ref 9–39)
BACTERIA UR CULT: NORMAL
BILIRUB SERPL-MCNC: 0.7 MG/DL (ref 0–1.2)
BUN SERPL-MCNC: 21 MG/DL (ref 6–23)
CALCIUM SERPL-MCNC: 9 MG/DL (ref 8.6–10.6)
CHLORIDE SERPL-SCNC: 107 MMOL/L (ref 98–107)
CHOLEST SERPL-MCNC: 115 MG/DL (ref 0–199)
CHOLESTEROL/HDL RATIO: 2.2
CO2 SERPL-SCNC: 28 MMOL/L (ref 21–32)
CREAT SERPL-MCNC: 1.06 MG/DL (ref 0.5–1.3)
EGFRCR SERPLBLD CKD-EPI 2021: 70 ML/MIN/1.73M*2
ERYTHROCYTE [DISTWIDTH] IN BLOOD BY AUTOMATED COUNT: 13.1 % (ref 11.5–14.5)
GLUCOSE SERPL-MCNC: 107 MG/DL (ref 74–99)
HCT VFR BLD AUTO: 41 % (ref 41–52)
HDLC SERPL-MCNC: 51.9 MG/DL
HGB BLD-MCNC: 12.8 G/DL (ref 13.5–17.5)
LDLC SERPL CALC-MCNC: 53 MG/DL
MCH RBC QN AUTO: 30.5 PG (ref 26–34)
MCHC RBC AUTO-ENTMCNC: 31.2 G/DL (ref 32–36)
MCV RBC AUTO: 98 FL (ref 80–100)
NON HDL CHOLESTEROL: 63 MG/DL (ref 0–149)
NRBC BLD-RTO: 0 /100 WBCS (ref 0–0)
PLATELET # BLD AUTO: 146 X10*3/UL (ref 150–450)
POTASSIUM SERPL-SCNC: 4.5 MMOL/L (ref 3.5–5.3)
PROT SERPL-MCNC: 5.7 G/DL (ref 6.4–8.2)
RBC # BLD AUTO: 4.2 X10*6/UL (ref 4.5–5.9)
SODIUM SERPL-SCNC: 143 MMOL/L (ref 136–145)
TRIGL SERPL-MCNC: 52 MG/DL (ref 0–149)
VLDL: 10 MG/DL (ref 0–40)
WBC # BLD AUTO: 6.5 X10*3/UL (ref 4.4–11.3)

## 2024-11-15 PROCEDURE — 80053 COMPREHEN METABOLIC PANEL: CPT

## 2024-11-15 PROCEDURE — 80061 LIPID PANEL: CPT

## 2024-11-15 PROCEDURE — 36415 COLL VENOUS BLD VENIPUNCTURE: CPT

## 2024-11-15 PROCEDURE — 85027 COMPLETE CBC AUTOMATED: CPT

## 2024-11-15 NOTE — RESULT ENCOUNTER NOTE
Please inform the patient that his urine culture did not grow any bacteria so this confirms that he does not have a UTI causing the blood in his urine.  Please confirm that he did call his urologist and set up an appointment to further investigate this.

## 2024-11-15 NOTE — TELEPHONE ENCOUNTER
----- Message from Mj Moreira sent at 11/15/2024  8:54 AM EST -----  Please inform the patient that his urine culture did not grow any bacteria so this confirms that he does not have a UTI causing the blood in his urine.  Please confirm that he did call his urologist and set up an appointment to further investigate this.

## 2024-11-20 ENCOUNTER — APPOINTMENT (OUTPATIENT)
Dept: PRIMARY CARE | Facility: CLINIC | Age: 84
End: 2024-11-20
Payer: MEDICARE

## 2024-11-20 VITALS
BODY MASS INDEX: 31.81 KG/M2 | DIASTOLIC BLOOD PRESSURE: 62 MMHG | RESPIRATION RATE: 16 BRPM | WEIGHT: 240 LBS | SYSTOLIC BLOOD PRESSURE: 116 MMHG | HEIGHT: 73 IN | HEART RATE: 88 BPM

## 2024-11-20 DIAGNOSIS — M17.12 PRIMARY OSTEOARTHRITIS OF LEFT KNEE: Primary | ICD-10-CM

## 2024-11-20 PROBLEM — D17.71 ANGIOMYOLIPOMA OF KIDNEY: Status: ACTIVE | Noted: 2024-11-20

## 2024-11-20 PROBLEM — R39.12 WEAK URINARY STREAM: Status: ACTIVE | Noted: 2024-11-20

## 2024-11-20 PROBLEM — R39.15 URINARY URGENCY: Status: ACTIVE | Noted: 2024-11-20

## 2024-11-20 PROBLEM — N39.41 URGE INCONTINENCE: Status: ACTIVE | Noted: 2024-11-20

## 2024-11-20 PROBLEM — N28.89 RENAL MASS: Status: ACTIVE | Noted: 2024-11-20

## 2024-11-20 PROBLEM — N32.3 BLADDER DIVERTICULUM: Status: ACTIVE | Noted: 2024-11-20

## 2024-11-20 PROBLEM — R35.1 NOCTURIA: Status: ACTIVE | Noted: 2024-11-20

## 2024-11-20 PROBLEM — R39.198 SLOWING OF URINARY STREAM: Status: ACTIVE | Noted: 2024-11-20

## 2024-11-20 PROBLEM — R33.9 INCOMPLETE EMPTYING OF BLADDER: Status: ACTIVE | Noted: 2024-11-20
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
## 2024-11-20 PROBLEM — D30.9: Status: ACTIVE | Noted: 2024-11-20

## 2024-11-20 PROCEDURE — 3074F SYST BP LT 130 MM HG: CPT | Performed by: FAMILY MEDICINE

## 2024-11-20 PROCEDURE — 1036F TOBACCO NON-USER: CPT | Performed by: FAMILY MEDICINE

## 2024-11-20 PROCEDURE — 20610 DRAIN/INJ JOINT/BURSA W/O US: CPT | Performed by: FAMILY MEDICINE

## 2024-11-20 PROCEDURE — 3078F DIAST BP <80 MM HG: CPT | Performed by: FAMILY MEDICINE

## 2024-11-20 PROCEDURE — 1160F RVW MEDS BY RX/DR IN RCRD: CPT | Performed by: FAMILY MEDICINE

## 2024-11-20 PROCEDURE — 1123F ACP DISCUSS/DSCN MKR DOCD: CPT | Performed by: FAMILY MEDICINE

## 2024-11-20 PROCEDURE — 1159F MED LIST DOCD IN RCRD: CPT | Performed by: FAMILY MEDICINE

## 2024-11-20 RX ORDER — TRIAMCINOLONE ACETONIDE 40 MG/ML
40 INJECTION, SUSPENSION INTRA-ARTICULAR; INTRAMUSCULAR
Status: COMPLETED | OUTPATIENT
Start: 2024-11-20 | End: 2024-11-20

## 2024-11-20 RX ORDER — LIDOCAINE HYDROCHLORIDE 10 MG/ML
2 INJECTION, SOLUTION INFILTRATION; PERINEURAL
Status: COMPLETED | OUTPATIENT
Start: 2024-11-20 | End: 2024-11-20

## 2024-11-20 NOTE — PROGRESS NOTES
Juan Carlos Nguyen is a 84 y.o. male here today for   Chief Complaint   Patient presents with    Injections     Knee injection        HPI   Patient with long history of progressive left knee osteoarthritis.  We had discussed a steroid injection at his last visit and he is here today to receive the injection.      Current Outpatient Medications:     albuterol 2.5 mg /3 mL (0.083 %) nebulizer solution, Take 3 mL (2.5 mg) by nebulization every 6 hours if needed for wheezing., Disp: 75 mL, Rfl: 3    albuterol 90 mcg/actuation inhaler, Inhale 1 puff every 4 hours if needed for wheezing., Disp: 18 g, Rfl: 6    aspirin 81 mg EC tablet, , Disp: , Rfl:     atorvastatin (Lipitor) 80 mg tablet, TAKE ONE TABLET BY MOUTH EVERY DAY, Disp: 90 tablet, Rfl: 1    budesonide-formoteroL (Symbicort) 160-4.5 mcg/actuation inhaler, Inhale 2 puffs 2 times a day. Rinse mouth with water after use to reduce aftertaste and incidence of candidiasis. Do not swallow., Disp: 10.2 g, Rfl: 3    calcium 500 mg calcium (1,250 mg) tablet, Take 1 tablet (1,250 mg) by mouth once daily., Disp: , Rfl:     cholecalciferol (Vitamin D3) 5,000 Units tablet, Take by mouth once daily., Disp: , Rfl:     dutasteride (Avodart) 0.5 mg capsule, Take 1 capsule (0.5 mg) by mouth once daily., Disp: , Rfl:     Eliquis 5 mg tablet, TAKE ONE TABLET BY MOUTH TWO TIMES A DAY, Disp: 180 tablet, Rfl: 0    famotidine (Pepcid) 40 mg tablet, TAKE ONE TABLET BY MOUTH TWO TIMES A DAY, Disp: 180 tablet, Rfl: 0    guaiFENesin (Mucinex) 1,200 mg tablet extended release 12hr, Take 1 tablet (1,200 mg) by mouth every 12 hours if needed., Disp: , Rfl:     loratadine (Claritin) 10 mg tablet, Take 1 tablet (10 mg) by mouth once daily., Disp: , Rfl:     omega 3-dha-epa-fish oil (Fish OiL) 1,200 (144-216) mg capsule, Take by mouth., Disp: , Rfl:     pantoprazole (ProtoNix) 40 mg EC tablet, TAKE ONE TABLET (40 mg) BY MOUTH DAILY. do not crush, chew, or split, Disp: 90 tablet, Rfl: 0     propranolol (Inderal) 20 mg tablet, TAKE ONE TABLET BY MOUTH TWO TIMES A DAY, Disp: 90 tablet, Rfl: 0    sotalol (Betapace) 80 mg tablet, TAKE ONE TABLET (80 mg) BY MOUTH EVERY 12 HOURS, Disp: 180 tablet, Rfl: 0    terazosin (Hytrin) 10 mg capsule, TAKE ONE CAPSULE (10 mg) BY MOUTH DAILY AT BEDTIME, Disp: 90 capsule, Rfl: 0    ursodiol (Actigall) 300 mg capsule, TAKE ONE CAPSULE BY MOUTH TWO TIMES A DAY, Disp: 180 capsule, Rfl: 1    valsartan (Diovan) 80 mg tablet, TAKE ONE TABLET BY MOUTH DAILY for blood pressure, Disp: 30 tablet, Rfl: 0    zinc gluconate 50 mg tablet, Take by mouth., Disp: , Rfl:     Patient Active Problem List   Diagnosis    Actinic keratosis    Allergic rhinitis    Anxiety    Ventricular ectopy    Asthma    Basal cell carcinoma    Essential hypertension, benign    BPH (benign prostatic hyperplasia)    Choledocholithiasis    Chronic obstructive pulmonary disease (Multi)    Dysuria-frequency syndrome    Essential tremor    Mastodynia    Lung nodules    Lump or mass in breast    Mixed hyperlipidemia    History of ST elevation myocardial infarction (STEMI)    Gross hematuria    Gastroesophageal reflux disease without esophagitis    Frequent PVCs    MGD (meibomian gland dysfunction)    Mild CAD    Nuclear sclerosis of both eyes    Class 1 obesity    Obstructive sleep apnea    Osteoarthritis of knee    PAC (premature atrial contraction)    Paroxysmal atrial fibrillation (Multi)    Pulmonary hypertension (Multi)    PVD (posterior vitreous detachment), both eyes    Recurrent nephrolithiasis    Retained gallstones following laparoscopic cholecystectomy    Seborrheic keratosis, inflamed    Sinus bradycardia    Intention tremor    Trichiasis of right eyelid    Venous lake of lip    COPD (chronic obstructive pulmonary disease) (Multi)    Bilateral presbyopia    Bilateral sensorineural hearing loss    Nasal polyps    Stroke-like symptoms    Implantable loop recorder present    Localized, primary  osteoarthritis    TIA (transient ischemic attack)    Anticoagulant long-term use    Chronic sinusitis    ABE (obstructive sleep apnea)    BMI 33.0-33.9,adult    Former smoker    Class 1 obesity due to excess calories with serious comorbidity and body mass index (BMI) of 34.0 to 34.9 in adult    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, unspecified etiology (Multi)    Anisometropia    BMI 31.0-31.9,adult    Encounter for medication review and counseling    Encounter to discuss treatment options    Abnormal genitourinary radiography    Angiomyolipoma of kidney    Benign neoplasm of urinary organ    Bladder diverticulum    Incomplete emptying of bladder    Nocturia    Renal mass    Slowing of urinary stream    Unspecified fall, initial encounter    Urge incontinence    Urinary urgency    Weak urinary stream         Recent Results (from the past 4 weeks)   POCT UA (nonautomated) manually resulted    Collection Time: 11/13/24 11:03 AM   Result Value Ref Range    POC Color, Urine Niharika (A) Straw, Yellow, Light-Yellow    POC Appearance, Urine Cloudy (A) Clear    POC Glucose, Urine NEGATIVE NEGATIVE mg/dl    POC Bilirubin, Urine NEGATIVE NEGATIVE    POC Ketones, Urine NEGATIVE NEGATIVE mg/dl    POC Specific Gravity, Urine >=1.030 1.005 - 1.035    POC Blood, Urine MODERATE (2+) (A) NEGATIVE    POC PH, Urine 6.0 No Reference Range Established PH    POC Protein, Urine NEGATIVE NEGATIVE, 30 (1+) mg/dl    POC Urobilinogen, Urine 0.2 0.2, 1.0 EU/DL    Poc Nitrite, Urine NEGATIVE NEGATIVE    POC Leukocytes, Urine NEGATIVE NEGATIVE   Urine Culture    Collection Time: 11/13/24 11:22 AM    Specimen: Clean Catch/Voided; Urine   Result Value Ref Range    Urine Culture No significant growth    CBC    Collection Time: 11/15/24 10:55 AM   Result Value Ref Range    WBC 6.5 4.4 - 11.3 x10*3/uL    nRBC 0.0 0.0 - 0.0 /100 WBCs    RBC 4.20 (L) 4.50 - 5.90 x10*6/uL    Hemoglobin 12.8 (L) 13.5 - 17.5 g/dL    Hematocrit 41.0  "41.0 - 52.0 %    MCV 98 80 - 100 fL    MCH 30.5 26.0 - 34.0 pg    MCHC 31.2 (L) 32.0 - 36.0 g/dL    RDW 13.1 11.5 - 14.5 %    Platelets 146 (L) 150 - 450 x10*3/uL   Lipid Panel    Collection Time: 11/15/24 10:55 AM   Result Value Ref Range    Cholesterol 115 0 - 199 mg/dL    HDL-Cholesterol 51.9 mg/dL    Cholesterol/HDL Ratio 2.2     LDL Calculated 53 <=99 mg/dL    VLDL 10 0 - 40 mg/dL    Triglycerides 52 0 - 149 mg/dL    Non HDL Cholesterol 63 0 - 149 mg/dL   Comprehensive Metabolic Panel    Collection Time: 11/15/24 10:55 AM   Result Value Ref Range    Glucose 107 (H) 74 - 99 mg/dL    Sodium 143 136 - 145 mmol/L    Potassium 4.5 3.5 - 5.3 mmol/L    Chloride 107 98 - 107 mmol/L    Bicarbonate 28 21 - 32 mmol/L    Anion Gap 13 10 - 20 mmol/L    Urea Nitrogen 21 6 - 23 mg/dL    Creatinine 1.06 0.50 - 1.30 mg/dL    eGFR 70 >60 mL/min/1.73m*2    Calcium 9.0 8.6 - 10.6 mg/dL    Albumin 3.7 3.4 - 5.0 g/dL    Alkaline Phosphatase 51 33 - 136 U/L    Total Protein 5.7 (L) 6.4 - 8.2 g/dL    AST 17 9 - 39 U/L    Bilirubin, Total 0.7 0.0 - 1.2 mg/dL    ALT 12 10 - 52 U/L        Objective    Visit Vitals    Visit Vitals  /62   Pulse 88   Resp 16   Ht 1.854 m (6' 1\")   Wt 109 kg (240 lb)   BMI 31.66 kg/m²   Smoking Status Never   BSA 2.37 m²       Body mass index is 31.66 kg/m².     Physical Exam         Assessment & Plan  Primary osteoarthritis of left knee         Joint Injection Large/Arthrocentesis: L knee on 11/20/2024 3:54 PM  Indications: pain and joint swelling  Details: 25 G needle, anterolateral approach  Medications: 40 mg triamcinolone acetonide 40 mg/mL; 2 mL lidocaine 10 mg/mL (1 %)    I sterilely injected the patient's left knee from the anterior lateral aspect while seated in the usual fashion.  Patient tolerated the procedure well with no complications and he left the office in stable condition.  Consent was given by the patient. Immediately prior to procedure a time out was called to verify the correct " patient, procedure, equipment, support staff and site/side marked as required. Patient was prepped and draped in the usual sterile fashion.                    No orders of the defined types were placed in this encounter.       No orders of the defined types were placed in this encounter.

## 2024-11-25 DIAGNOSIS — N34.3 DYSURIA-FREQUENCY SYNDROME: ICD-10-CM

## 2024-11-26 RX ORDER — URSODIOL 300 MG/1
300 CAPSULE ORAL 2 TIMES DAILY
Qty: 180 CAPSULE | Refills: 1 | Status: SHIPPED | OUTPATIENT
Start: 2024-11-26

## 2024-11-27 ENCOUNTER — HOSPITAL ENCOUNTER (OUTPATIENT)
Dept: CARDIOLOGY | Facility: HOSPITAL | Age: 84
Discharge: HOME | End: 2024-11-27
Payer: MEDICARE

## 2024-11-27 DIAGNOSIS — Z95.818 IMPLANTABLE LOOP RECORDER PRESENT: ICD-10-CM

## 2024-11-27 PROCEDURE — 93298 REM INTERROG DEV EVAL SCRMS: CPT

## 2024-11-27 PROCEDURE — 93298 REM INTERROG DEV EVAL SCRMS: CPT | Performed by: INTERNAL MEDICINE

## 2024-12-02 ENCOUNTER — HOSPITAL ENCOUNTER (OUTPATIENT)
Dept: RADIOLOGY | Facility: HOSPITAL | Age: 84
Discharge: HOME | End: 2024-12-02
Payer: MEDICARE

## 2024-12-02 DIAGNOSIS — D17.71 BENIGN LIPOMATOUS NEOPLASM OF KIDNEY: ICD-10-CM

## 2024-12-02 PROCEDURE — 76770 US EXAM ABDO BACK WALL COMP: CPT | Performed by: RADIOLOGY

## 2024-12-02 PROCEDURE — 76770 US EXAM ABDO BACK WALL COMP: CPT

## 2024-12-04 ENCOUNTER — APPOINTMENT (OUTPATIENT)
Dept: PULMONOLOGY | Facility: CLINIC | Age: 84
End: 2024-12-04
Payer: MEDICARE

## 2024-12-05 ENCOUNTER — OFFICE VISIT (OUTPATIENT)
Dept: PULMONOLOGY | Facility: CLINIC | Age: 84
End: 2024-12-05
Payer: MEDICARE

## 2024-12-05 VITALS
BODY MASS INDEX: 31.54 KG/M2 | WEIGHT: 238 LBS | RESPIRATION RATE: 20 BRPM | OXYGEN SATURATION: 93 % | DIASTOLIC BLOOD PRESSURE: 64 MMHG | HEIGHT: 73 IN | SYSTOLIC BLOOD PRESSURE: 105 MMHG | TEMPERATURE: 97.7 F | HEART RATE: 58 BPM

## 2024-12-05 DIAGNOSIS — J45.51 SEVERE PERSISTENT ASTHMA WITH ACUTE EXACERBATION (MULTI): ICD-10-CM

## 2024-12-05 DIAGNOSIS — J45.40 MODERATE PERSISTENT ASTHMA WITHOUT COMPLICATION (HHS-HCC): Primary | ICD-10-CM

## 2024-12-05 PROCEDURE — 99214 OFFICE O/P EST MOD 30 MIN: CPT

## 2024-12-05 PROCEDURE — 1123F ACP DISCUSS/DSCN MKR DOCD: CPT

## 2024-12-05 PROCEDURE — 1036F TOBACCO NON-USER: CPT

## 2024-12-05 PROCEDURE — 1159F MED LIST DOCD IN RCRD: CPT

## 2024-12-05 PROCEDURE — G2211 COMPLEX E/M VISIT ADD ON: HCPCS

## 2024-12-05 PROCEDURE — 3074F SYST BP LT 130 MM HG: CPT

## 2024-12-05 PROCEDURE — 3078F DIAST BP <80 MM HG: CPT

## 2024-12-05 RX ORDER — PREDNISONE 10 MG/1
TABLET ORAL
Qty: 30 TABLET | Refills: 0 | Status: SHIPPED | OUTPATIENT
Start: 2024-12-05 | End: 2025-01-04

## 2024-12-05 RX ORDER — FLUTICASONE FUROATE, UMECLIDINIUM BROMIDE AND VILANTEROL TRIFENATATE 100; 62.5; 25 UG/1; UG/1; UG/1
1 POWDER RESPIRATORY (INHALATION) DAILY
Qty: 60 EACH | Refills: 3 | Status: SHIPPED | OUTPATIENT
Start: 2024-12-05 | End: 2024-12-05 | Stop reason: WASHOUT

## 2024-12-05 RX ORDER — IPRATROPIUM BROMIDE AND ALBUTEROL SULFATE 2.5; .5 MG/3ML; MG/3ML
3 SOLUTION RESPIRATORY (INHALATION) 4 TIMES DAILY PRN
Qty: 90 ML | Refills: 5 | Status: SHIPPED | OUTPATIENT
Start: 2024-12-05 | End: 2025-12-05

## 2024-12-05 RX ORDER — FLUTICASONE FUROATE, UMECLIDINIUM BROMIDE AND VILANTEROL TRIFENATATE 200; 62.5; 25 UG/1; UG/1; UG/1
1 POWDER RESPIRATORY (INHALATION) DAILY
Qty: 28 EACH | Refills: 3 | Status: SHIPPED | OUTPATIENT
Start: 2024-12-05

## 2024-12-05 RX ORDER — PREDNISONE 20 MG/1
1 TABLET ORAL
COMMUNITY
Start: 2024-12-04

## 2024-12-05 NOTE — PROGRESS NOTES
Patient: Juan Carlos Nguyen    [id number]  : 1940 -- AGE 84 y.o.    Provider: Zhanna SANABRIA- CNP     Location [facility] [place]   Service Date: 2024            [facility]  Follow Up Visit Note      HISTORY OF PRESENT ILLNESS     The patient's referring provider is: No ref. provider found    HISTORY OF PRESENT ILLNESS   Juan Carlos Nguyen is a 84 y.o. male with a history of  Asthma, COPD, GERD, CAD, DLD, allergic rhinitis and nasal polyps,  who is a former cigar smoker, who presents to a [facility] for a follow up evaluation for Asthma/COPD overlap.     I have independently interviewed and examined the patient in the office and reviewed available records.    Current History    On today's visit, the patient reports feeling so short of breath with wheezing yesterday morning he had to use his nebulizer twice with some improvement, fell asleep, woke back up feeling worse. Went to ER, had 3 duo- nebs before he felt better. Covid flu and RSV negative. Was sent home with prednisone burst.   Today states he needed to use nebulizer twice but feeling better now this afternoon.    Does not recall me wanting him to start Trelegy 100 after last visit. Discussed his eosinophil level with him today. Will start Trelegy 200 then discussed that we may need to start Fasenra.  He stats his asthma was well controlled until this past year.    10/23/24: Since last visit I ordered prednisone taper 10/17/24 for exacerbation. States he is feeling much better and back to his baseline. He has been using Symbicort 160 and has noticed an improvement (been using for 1.5 months). Only used albuterol during asthma exacerbation, rarely uses other than then. Has noticed his voice is raspy, with post nasal drip and throat clearing. Uses nasacort for nasal polyps.  He denies cough, wheeze, chest tightness, shortness of breath and ER visits for breathing issues.      9/20/24: Since last visit he reports for the past month he has had an increase in shortness of breath with walking, productive cough with yellow mucus.  He is also had issues with his GERD acting up so he was 1 switched from pantoprazole to Pepcid 4 mg twice daily.  He says since switching to Pepcid his GERD has been getting better but its only been a week.  He is using Symbicort 80 mcg 2 puffs twice daily and needing to use his albuterol 3-4 times a day over the past month.   He also has a lot of wheezing.  Was having more chest tightness but since GERD is getting better the chest tightness has subsided.  He reports this year has been worse for his allergies, he is taking Claritin daily.     10/18/23: On today's visit, the patient reports he uses symbicort daily, rarely uses his albuterol. He reports he swims 3 days a week, rides his bike with no issues nut notices when he walks for a distance he feels SOB. He has not tried his albuterol inhaler to see if he improves. He wears his CPAP nightly. He states he has developed nasal polyps that have gotten worse, he has to breath out of his mouth, he is having surgery to remove the polpys 10/31/23. He is up to date with vaccines.      Dr. Ann: last seen 7/6/22  81-year-old male with medical history of coronary artery disease, GERD, dyslipidemia, asthma was referred to me for coughing, wheezing, shortness of breath going on for 4 to 5 months.     February 9, 2021:  As per the patient, he was admitted at [facility] for UTI and then he developed shortness of breath was found to have coronary artery disease status post stent placement.      Since August he started noticing coughing, sneezing, wheezing which got worse and more at night till November. However at this time he did not had any shortness of breath as he was doing cardiac rehab without any issues.      His cough persisted and then in December he started to notice shortness of breath and went to urgent  care where he was given nebulizer and prednisone which helped him. He got another prednisone course after that by his PMD and was started on Stiolto. He feels that once he is on prednisone his breathing gets better. He is taking Stiolto once a day and he is using albuterol multiple times during the day.     Today he complains of shortness of breath on exertion and has to walk on his own pace on ground level. He also complains of swelling of ankles and legs. He complains of wheezing at night. His cough is better. He does have seasonal allergies. He does have runny nose, heartburn, joint pains.     He has a history of obstructive sleep apnea for the past 20 years and has been on CPAP and has been compliant with that.     March 3, 2021:  Comes for follow-up after PFTs Singh and blood test. Compliant with Symbicort and Spiriva. Feels that breathing is better. No more wheezing. Shortness of breath has improved.     Vickie 15, 2021:  Comes for follow-up. Currently on Symbicort 2 puffs twice a day and stopped taking Spiriva since April 2021. Shortness of breath is better able to ride a bike. One episode of posttussive loss of consciousness last year. Since then denies any dizziness or loss of consciousness     no pets, change in lifestyle, new beauty products, carpets or curtains. Not noticed any mold in house     Today denies any hemoptysis, fever, , anorexia, any seasonal allergies, chest pain or heartburn.     December 15, 2021:  Comes for follow up.     He followed with Dr. Monet in the interim since his last visit, who deemed his symptoms related to asthma and did not feel the need for extra medication intervention. He also followed with Dr. Spring. He will have a PFT performed on 12/22/2021.     He is compliant with CPAP usage. He reports that he continues to have some shortness of breath on exertion such as while climbing more than 2 flights of stairs. He walks minimally, primarily due to knee problems. He uses  Symbicort twice daily with no complaints. He notes that while singing, his ability to hold notes have improved. He is no longer smoking cigars.     July 6, 2022:  Comes for follow up.     His breathing is improving with the regular use of Symbicort 80. He denies any shortness of breath and swims about 3 times per week. He also bike rides for approximately 3 miles lasting 1 hour. He has difficulty with climbing stairs or walking long distances due to chronic knee pain. He denies awakening at night due to breathing difficulties. He uses his albuterol rescue inhaler about twice per month, and he has not used his nebulizer machine.     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 Rest in the attached document  Meds; as per [employer]        Physical Examination  Vitals Reviewed as charted            general; Able to speak in full sentences, no use of accessory muscles of breathing   No Pallor, Cyanosis, Clubbing   Mallampati: 3  no lymphadenopathy noted on neck examination  Chest auscultation reveals bilateral good air entry, bilateral wheezing, more on the right side.  CVS: RRR and S1 + S2 are audible with no murmurs   Abdomen is Soft and Non Tender  Extremities: warm , atraumatic , no pedal edema, pulses palpable Bilateral.  Neurological: Alert and oriented x3,   Skin ; no rash           Relevant Labs and Imaging: Personally reviewed  eos 4.7%  Food allergy panel negative  IgE 11  Respiratory allergy panel negative        6-minute walk test February 2021:He  was able to walk to 299 m which is 980 feet in 6-minute. His resting room air oxygen saturation was 95%. His minimum oxygen saturation during test was 92%. He was very slightly fatigued and slightly dyspneic after the test.     FeNO 71, high  FENO 52 2021     Pulmonary Function tests:Pulmonary function test 2021: Severe obstruction, bronchodilator response in FEV1, no restriction, air trapping, normal DLCO  FEV1/FVC 48  FEV1 1.45 L and 44%  FVC of 3.03 L and 68%  TLC 6.25 L and 81%  RV of 4.15 L and 140%  DLCO of 25 and 100%     Pulmonary Function tests: 2021: Moderate obstruction, bronchodilator response in FEV1, mild restriction, normal DLCO, FENO 63  FEV1/FVC: 57  FEV1: 1.74L and 53%  FVC: 3.06L and 59%  T.58L and 72%  RV: 3L and 101%  DLCO: 20.55 and 80%  Repeat FENO was in 2022, levels were 63.     xray chest: :2020: no infiltrates ,      CT chest: CT chest 2021: Shows calcified lung nodules in right upper lobe and right middle lobe. Also calcification seen in the liver and in the spleen secondary to old granulomatous disease. There also b/l UL GGO/fibrotic changes which is has been stable since 2018. Mildly enlarged pulmonary artery. There are calcified mediastinal lymph nodes.     Echocardiogram: 2020:Ejection fraction 55 to 60%. Right ventricle is enlarged with RVSP of 70. Diastolic dysfunction. His RVSP was 18 in 2019 with normal right ventricular size     Echocardiogram 2021: Ejection fraction 6065%. RVSP 50. Right ventricle normal size and function diastolic dysfunction.     Right and left heart cath 2020: Pulmonary artery 50/15, mean pulmonary artery pressure 30, mean pulmonary wedge pressure 13, RV 50/20 with a mean of 12. Cardiac output 7.1, cardiac index 4.3. PVR 4 Wood unit  Normal LV function with LVEF of 60%. Mild coronary atherosclerosis with no obstruction     Assessment;  81-year-old male with medical history of coronary artery disease,  Obstructive sleep apnea on CPAP, asthma COPD overlap , afib on AC, comes for follow up after seeing Dr. Monet who does not think the patient has pulmonary arterial hypertension, likely has pulmonary venous hypertension due to diastolic heart failure and sleep apnea.He reports that he continues to have some shortness of breath on exertion such as while climbing more than 2 flights of stairs otherwise able to swim 3/week and sing in choir without any trouble     7/6/22:  Comes for follow up after PFTs showing moderate obstruction and high FENO. His breathing is improving with the regular use of Symbicort 80 bid. Denies any shortness of breath or awakening at night due to breathing difficulties. He swims about 3 times per week. He also bike rides for approximately 10 miles lasting 1 hour. He has difficulty with climbing stairs or walking long distances due to chronic knee pain. Using albuterol rescue inhaler about twice per month, has not used nebulizer.     Recommendations:  Continue with Symbicort 80, 2 puffs twice daily.  Asthma education and action plan done today, his asthma is well controlled, even with high FENO.  Advised to continue asthma follow up with PMD or with Dr. Tilley.       Previous pulmonary history: COPD/Asthma    Inhalers/nebulized medications: symbicort and albuterol    Hospitalization History: He has not been hospitalized over the last year for breathing related problem.    Sleep history: CPAP compliant      ALLERGIES AND MEDICATIONS     ALLERGIES  Allergies   Allergen Reactions    Montelukast Other    Fenofibrate Unknown    Finasteride Itching    Iodinated Contrast Media Other    Sulfamethizole Itching    Sulfamethoxazole-Trimethoprim Rash and Other     FACIAL REDNESS AND SWEATING       MEDICATIONS  Current Outpatient Medications   Medication Sig Dispense Refill    albuterol 2.5 mg /3 mL (0.083 %) nebulizer solution Take 3 mL (2.5 mg) by nebulization every 6 hours if needed for wheezing. 75 mL 3     albuterol 90 mcg/actuation inhaler Inhale 1 puff every 4 hours if needed for wheezing. 18 g 6    aspirin 81 mg EC tablet       atorvastatin (Lipitor) 80 mg tablet TAKE ONE TABLET BY MOUTH EVERY DAY 90 tablet 1    budesonide-formoteroL (Symbicort) 160-4.5 mcg/actuation inhaler Inhale 2 puffs 2 times a day. Rinse mouth with water after use to reduce aftertaste and incidence of candidiasis. Do not swallow. 10.2 g 3    calcium 500 mg calcium (1,250 mg) tablet Take 1 tablet (1,250 mg) by mouth once daily.      cholecalciferol (Vitamin D3) 5,000 Units tablet Take by mouth once daily.      dutasteride (Avodart) 0.5 mg capsule Take 1 capsule (0.5 mg) by mouth once daily.      Eliquis 5 mg tablet TAKE ONE TABLET BY MOUTH TWO TIMES A  tablet 0    famotidine (Pepcid) 40 mg tablet TAKE ONE TABLET BY MOUTH TWO TIMES A  tablet 0    guaiFENesin (Mucinex) 1,200 mg tablet extended release 12hr Take 1 tablet (1,200 mg) by mouth every 12 hours if needed.      loratadine (Claritin) 10 mg tablet Take 1 tablet (10 mg) by mouth once daily.      omega 3-dha-epa-fish oil (Fish OiL) 1,200 (144-216) mg capsule Take by mouth.      pantoprazole (ProtoNix) 40 mg EC tablet TAKE ONE TABLET (40 mg) BY MOUTH DAILY. do not crush, chew, or split 90 tablet 0    propranolol (Inderal) 20 mg tablet TAKE ONE TABLET BY MOUTH TWO TIMES A DAY 90 tablet 0    sotalol (Betapace) 80 mg tablet TAKE ONE TABLET (80 mg) BY MOUTH EVERY 12 HOURS 180 tablet 0    terazosin (Hytrin) 10 mg capsule TAKE ONE CAPSULE (10 mg) BY MOUTH DAILY AT BEDTIME 90 capsule 0    ursodiol (Actigall) 300 mg capsule TAKE ONE CAPSULE BY MOUTH TWO TIMES A  capsule 1    valsartan (Diovan) 80 mg tablet TAKE ONE TABLET BY MOUTH DAILY for blood pressure 30 tablet 0    zinc gluconate 50 mg tablet Take by mouth.       No current facility-administered medications for this visit.         PAST HISTORY     PAST MEDICAL HISTORY  Asthma/ COPD overlap  GERD  CAD  DLD  Allergic  rhinits  Nasal polyps    PAST SURGICAL HISTORY  Past Surgical History:   Procedure Laterality Date    CHOLECYSTECTOMY  10/14/2014    Cholecystectomy    MR HEAD ANGIO WO IV CONTRAST  8/14/2023    MR HEAD ANGIO WO IV CONTRAST 8/14/2023 [facility]    MR NECK ANGIO WO IV CONTRAST  8/14/2023    MR NECK ANGIO WO IV CONTRAST 8/14/2023 [facility]    OTHER SURGICAL HISTORY  04/30/2014    Left Breast Biopsy During Breast Surgery    OTHER SURGICAL HISTORY  04/11/2022    Loop recorder insertion    OTHER SURGICAL HISTORY  10/11/2021    Loop recorder insertion    OTHER SURGICAL HISTORY  10/11/2021    Tonsillectomy    OTHER SURGICAL HISTORY  10/11/2021    Cardioversion    OTHER SURGICAL HISTORY  06/07/2017    Anesthesia For Cystoscopy    OTHER SURGICAL HISTORY  04/11/2022    Colonoscopy    OTHER SURGICAL HISTORY  04/11/2022    Endoscopy    TONSILLECTOMY  06/07/2017    Tonsillectomy       IMMUNIZATION HISTORY  Immunization History   Administered Date(s) Administered    Flu vaccine (IIV4), preservative free *Check age/dose* 12/10/2013    Flu vaccine, quadrivalent, high-dose, preservative free, age 65y+ (FLUZONE) 10/10/2023    Flu vaccine, trivalent, preservative free, HIGH-DOSE, age 65y+ (Fluzone) 10/25/2024    Flu vaccine, trivalent, preservative free, age 6 months and greater (Fluarix/Fluzone/Flulaval) 11/17/2012, 10/18/2014    Influenza, Unspecified 12/01/2011, 11/16/2015, 02/20/2016, 12/20/2016, 10/12/2017, 10/12/2018, 11/11/2019, 10/08/2020, 10/23/2020, 09/27/2021, 09/28/2022    Influenza, seasonal, injectable 11/01/2022, 10/16/2023    Moderna COVID-19 vaccine, 12 years and older (50mcg/0.5mL)(Spikevax) 10/25/2024    Pfizer COVID-19 vaccine, 12 years and older, (30mcg/0.3mL) (Comirnaty) 11/18/2023    Pfizer Gray Cap SARS-CoV-2 05/13/2022    Pfizer Purple Cap SARS-CoV-2 01/26/2021, 02/16/2021, 09/27/2021, 05/01/2022    Pneumococcal conjugate vaccine, 13-valent (PREVNAR 13) 01/15/2016    Pneumococcal polysaccharide vaccine, 23-valent,  age 2 years and older (PNEUMOVAX 23) 08/14/2008, 04/12/2021, 10/01/2022    RSV, 60 Years And Older (AREXVY) 10/23/2023    Td vaccine, age 7 years and older (TENIVAC) 06/15/2010    Tdap vaccine, age 7 year and older (BOOSTRIX, ADACEL) 04/21/2022    Zoster vaccine, recombinant, adult (SHINGRIX) 07/09/2022, 09/28/2022    Zoster, live 02/21/2013       SOCIAL HISTORY  Tobacco Smoking: former cigar smoker  Smokeless Tobacco/Vaping: none  Illicit drugs: none  Alcohol consumption: socially  Pets: no    OCCUPATIONAL/ENVIRONMENTAL HISTORY  Occupation: reviously worked at [employer] in [employer] with carbon [employer]. Exposed to asbestos one day working there.   No known, silica, beryllium or inhaled metals.  No exposure to birds or exotic animals.    FAMILY HISTORY  Family History   Problem Relation Name Age of Onset    Diabetes Mother      Cancer Mother      Breast cancer Mother      Diabetes Father      Cancer Father      Prostate cancer Father      Cancer Sister      Breast cancer Sister      Other (oral cancer) Brother      Cancer Daughter      Breast cancer Daughter      Other (breast cancer in female) Other       No family history of pulmonary disease.  No family history of cancer.  No family history of autoimmune disorders.    REVIEW OF SYSTEMS     REVIEW OF SYSTEMS  Review of Systems    Constitutional: No fever, no chills, no night sweats.    Eyes: No double vision, no floaters, no dry eyes.   ENT: See HPI.   Neck: No neck stiffness.  Cardiovascular: No sharp chest pain, no heart racing, no leg swelling.  Respiratory: as noted in HPI.   Gastrointestinal: No nausea, no vomiting, no diarrhea.   Musculoskeletal: No joint pain, no back pain.   Integumentary: No rashes or sores.  Neurological: No dizziness, no headaches. Sleeping well.  Psychiatric: No mood changes.   Endocrine: No hot flashes, no cold intolerance, weight is stable.  Hematologic: No easy bruising or bleeding.    PHYSICAL EXAM     VITAL SIGNS: There were no vitals  taken for this visit.     CURRENT WEIGHT: There is no height or weight on file to calculate BMI.  PREVIOUS WEIGHTS:  Wt Readings from Last 3 Encounters:   11/20/24 109 kg (240 lb)   11/13/24 109 kg (240 lb)   10/23/24 109 kg (239 lb 3.2 oz)       Physical Exam    Constitutional: General appearance: Alert and oriented.  No acute distress. Well developed, well nourished.  Head and face: Symmetric  ENT: external inspection of ear and nose normal. No intranasal polyps. No oropharyngeal exudates.    Oropharynx: normal   Neck: supple, no lymphadenopathy  Pulmonary: Chest is normal. No increased work of breathing or signs of respiratory distress. Clear to auscultation bilaterally - no crackles, wheezing, or rhonchi.   Cardiovascular: Heart rate and rhythm normal. Normal S1, S2 - no murmurs, gallops, or pericardial rub.   Abdomen: Soft, non tender, +BS  Extremities: No edema. No clubbing or cyanosis of the fingernails.    Neurologic: Moves all four extremities   MSK: Normal movements of extremities. Gait normal   Psychiatric: Intact judgement and insight.    RESULTS/DATA     Pulmonary Function Test Results                       Chest Radiograph     XR chest 2 view 09/13/2023    Narrative  Interpreted By:  PRASHANTH GORDON MD  STUDY:  Chest Radiographs;  9/13/2023, 1:29PM.    INDICATION:  Pre op.    COMPARISON:  CXR 4/2/2021.    ACCESSION NUMBER(S):  [id number]    ORDERING CLINICIAN:  CYNTHIA VÁZQUEZ MD    TECHNIQUE:  Frontal and lateral chest.    FINDINGS:    CARDIOMEDIASTINAL SILHOUETTE:  Cardiomediastinal silhouette is normal in size and configuration.    LUNGS:  Calcified granuloma right lung apex. No focal consolidation. No  pleural effusion. No pneumothorax.    ABDOMEN:  No remarkable upper abdominal findings.    BONES:  No acute osseous changes.    Impression  No acute cardiopulmonary abnormality.      Signed by Prashanth Gordon MD      Chest CT Scan     No testing done.      Echocardiogram       [facility] [place]  [place]  [address] [facility] [address]  Tel [phone] Fax [phone]     TRANSTHORACIC ECHOCARDIOGRAM REPORT        Patient Name:     DENISE JOHAN Sr Physician:  [id number] Sheela Varela MD  Study Date:       8/15/2023          Referring           SAKSHI PECK  Physician:  MRN/PID:          [id number]           PCP:  Accession/Order#: [id number]          Department          [facility] Echo Lab  Location:  YOB: 1940          Fellow:  Gender:           M                  Nurse:              Pedro Eubanks RN  Admit Date:       8/14/2023          Sonographer:        Ana Lutz [facility]  Admission Status: Inpatient -        Additional Staff:  Priority discharge  Height:           187.00 cm          CC Report to:  Weight:           116.00 kg          Study Type:         Echocardiogram  BSA:              2.40 m2  Blood Pressure: 122 /66 mmHg     Diagnosis/ICD: I63.9-Cerebral Infarction, unspecified  Indication:    Stroke  Procedure/CPT: Echo Complete w Full [id number]     Patient History:  Pertinent History: HTN, COPD, A-Fib and Palpitations. Previous echocardiogram  03-.     Study Detail: The following Echo studies were performed: 2D, M-Mode, Doppler and  color flow. Agitated saline used as a contrast agent for  intraseptal flow evaluation.        PHYSICIAN INTERPRETATION:  Left Ventricle: Left ventricular systolic function is normal, with an estimated ejection fraction of 60-65%. There are no regional wall motion abnormalities. The left ventricular cavity size is normal. There is mild concentric left ventricular hypertrophy. Spectral Doppler shows a pseudonormal pattern of left ventricular diastolic filling. There is no definite left ventricular thrombus visualized. The intraventricular septum appears intact without evidence of shunting or a ventricular septal defect.  Left Atrium: The left atrium is moderately dilated. There is no atrial septal defect present.  Right Ventricle: The  right ventricle is upper limits of normal in size. There is low normal right ventricular systolic function.  Right Atrium: The right atrium is mildly dilated.  Aortic Valve: The aortic valve is trileaflet. There is mild aortic valve thickening. There is no evidence of aortic valve stenosis.  There is trivial aortic valve regurgitation. The peak instantaneous gradient of the aortic valve is 9.1 mmHg.  Mitral Valve: The mitral valve is mild to moderately thickened. There is no evidence of mitral valve prolapse. There is no evidence of mitral valve stenosis. The doppler estimated mean and peak diastolic pressure gradients are 1.0 mmHg and 5.9 mmHg respectively. There is mild mitral annular calcification. There is mild to moderate mitral valve regurgitation which is centrally directed.  Tricuspid Valve: The tricuspid valve is structurally normal. There is no evidence of tricuspid valve stenosis. There is trace to mild tricuspid regurgitation. The Doppler estimated RVSP is mildly elevated at 40.2 mmHg.  Pulmonic Valve: The pulmonic valve was not assessed. There is trace pulmonic valve regurgitation.  Pericardium: There is no pericardial effusion noted.  Aorta: The aortic root is abnormal. There is mild dilatation of the ascending aorta. The aortic root is at the upper limits of normal size.  In comparison to the previous echocardiogram(s): Prior examinations are available and were reviewed for comparison purposes. Compared with echo 3/2021 LVEF is unchanged. Mitral and tricuspid valve regurgitation have worsened. There is now bi-atrial enlargement.        CONCLUSIONS:  1. Left ventricular systolic function is normal with a 60-65% estimated ejection fraction.  2. Intact intraventricular septum without shunting or a ventricular septal defect.  3. No left ventricular thrombus visualized.  4. Spectral Doppler shows a pseudonormal pattern of left ventricular diastolic filling.  5. There is low normal right ventricular  systolic function.  6. The left atrium is moderately dilated.  7. Mild to moderate mitral valve regurgitation.  8. No evidence of mitral valve prolapse.  9. There is No tricuspid stenosis.  10. Mildly elevated RVSP.  11. Aortic valve stenosis is not present.  12. Compared with echo 3/2021 LVEF is unchanged. Mitral and tricuspid valve regurgitation have worsened. There is now bi-atrial enlargement.     QUANTITATIVE DATA SUMMARY:  2D MEASUREMENTS:  Normal Ranges:  LAs:           5.50 cm    (2.7-4.0cm)  IVSd:          1.30 cm    (0.6-1.1cm)  LVPWd:         1.28 cm    (0.6-1.1cm)  LVIDd:         5.29 cm    (3.9-5.9cm)  LVIDs:         3.40 cm  LV Mass Index: 117.7 g/m2  LV % FS        35.7 %     LA VOLUME:  Normal Ranges:  LA Vol A4C:        73.6 ml    (22+/-6mL/m2)  LA Vol A2C:        103.8 ml  LA Vol BP:         88.0 ml  LA Vol Index A4C:  30.6ml/m2  LA Vol Index A2C:  43.2 ml/m2  LA Vol Index BP:   36.6 ml/m2  LA Area A4C:       23.9 cm2  LA Area A2C:       28.6 cm2  LA Major Axis A4C: 6.6 cm  LA Major Axis A2C: 6.7 cm  LA Volume Index:   42.1 ml/m2  LA Vol A4C:        71.0 ml  LA Vol A2C:        101.0 ml     M-MODE MEASUREMENTS:  Normal Ranges:  Ao Root: 3.50 cm (2.0-3.7cm)     AORTA MEASUREMENTS:  Normal Ranges:  Ao Sinus, d: 3.40 cm (2.1-3.5cm)  Ao STJ, d:   3.70 cm (1.7-3.4cm)  Asc Ao, d:   4.20 cm (2.1-3.4cm)     LV SYSTOLIC FUNCTION BY 2D PLANIMETRY (MOD):  Normal Ranges:  EF-A4C View: 63.3 % (>=55%)  EF-A2C View: 61.7 %  EF-Biplane:  63.4 %     LV DIASTOLIC FUNCTION:  Normal Ranges:  MV Peak E:        1.04 m/s    (0.7-1.2 m/s)  MV Peak A:        0.21 m/s    (0.42-0.7 m/s)  E/A Ratio:        5.02        (1.0-2.2)  MV lateral e'     0.06 m/s  MV medial e'      0.04 m/s  E/e' Ratio:       17.50       (<8.0)  PulmV Sys Toro:    23.40 cm/s  PulmV Delvalle Toro:   59.80 cm/s  PulmV A Revs Toro: 22.10 cm/s  PulmV A Revs Dur: 111.00 msec     MITRAL VALVE:  Normal Ranges:  MV Vmax:    1.21 m/s (<=1.3m/s)  MV peak P.9 mmHg  (<5mmHg)  MV mean P.0 mmHg (<48mmHg)  MV DT:      172 msec (150-240msec)     AORTIC VALVE:  Normal Ranges:  AoV Vmax:      1.51 m/s (<=1.7m/s)  AoV Peak P.1 mmHg (<20mmHg)  LVOT Max Toro:  0.96 m/s (<=1.1m/s)  LVOT VTI:      20.50 cm  LVOT Diameter: 2.00 cm  (1.8-2.4cm)  AoV Area,Vmax: 1.99 cm2 (2.5-4.5cm2)        RIGHT VENTRICLE:  RV Basal 4.80 cm  RV Mid   3.80 cm  RV Major 7.9 cm  TAPSE:   23.0 mm  RV s'    0.15 m/s     TRICUSPID VALVE/RVSP:  Normal Ranges:  Peak TR Velocity: 3.05 m/s  RV Syst Pressure: 40.2 mmHg (< 30mmHg)     PULMONIC VALVE:  Normal Ranges:  PV Accel Time: 108 msec (>120ms)  PV Max Toro:    1.2 m/s  (0.6-0.9m/s)  PV Max P.2 mmHg     Pulmonary Veins:  PulmV A Revs Dur: 111.00 msec  PulmV A Revs Toro: 22.10 cm/s  PulmV Delvalle Toro:   59.80 cm/s  PulmV Sys Toro:    23.40 cm/s        [id number] Sheela Varela MD  Electronically signed on 8/15/2023 at 5:30:38 PM                Labwork     Lab Results   Component Value Date    WBC 6.5 11/15/2024    HGB 12.8 (L) 11/15/2024    HCT 41.0 11/15/2024    MCV 98 11/15/2024     (L) 11/15/2024      Lab Results   Component Value Date    GLUCOSE 107 (H) 11/15/2024    CALCIUM 9.0 11/15/2024     11/15/2024    K 4.5 11/15/2024    CO2 28 11/15/2024     11/15/2024    BUN 21 11/15/2024    CREATININE 1.06 11/15/2024      Lab Results   Component Value Date    ALT 12 11/15/2024    AST 17 11/15/2024    ALKPHOS 51 11/15/2024    BILITOT 0.7 11/15/2024        Protime   Date/Time Value Ref Range Status   2023 04:07 PM 14.7 (H) 9.8 - 12.8 sec Final     Comment:     Note new reference range as of 2023 at 10:00am.     INR   Date/Time Value Ref Range Status   2023 04:07 PM 1.3 (H) 0.9 - 1.1 Final       Immunocap IgE   Date/Time Value Ref Range Status   2024 10:34 AM 28.2 <=214 KU/L Final     Comment:     Note: Omalizumab (Xolair, GeneVirtustream; humanized  IgG1 antihuman IgE Fc) treatment does not  significantly interfere with the  accuracy of  total IgE on the ImmunoCAP ([employer]) platform.  J Allergy Clin Immunol 2006;117:759-66).  Allergens, parasitic diseases, smoking, and  alcohol consumption have been reported to  increase levels of total IgE in serum.     IgE   Date/Time Value Ref Range Status   02/18/2021 10:27 AM 11 0 - 214 IU/mL Final     Aspergillus Fumigatus IgE   Date/Time Value Ref Range Status   09/20/2024 10:34 AM <0.10 <0.10 kU/L Final       Peripheral Eosinophil Count/Percentage:   Eosinophils Absolute (x10*3/uL)   Date Value   09/20/2024 0.85 (H)     Eosinophils % (%)   Date Value   09/20/2024 12.5       ASSESSMENT/PLAN   Mr. Nguyen is a 84 y.o. male, with a history of Asthma/COPD overlap, GERD, CAD, DLD, allergic rhinitis and nasal polyps, former cigar smoker, being evaluated for follow up visit for Asthma/COPD overlap.     May need to start fasenra if he fails to have improvement on trelegy 200    Problem List and Orders  Problem List Items Addressed This Visit       Asthma - Primary    Relevant Medications    ipratropium-albuteroL (Duo-Neb) 0.5-2.5 mg/3 mL nebulizer solution    fluticasone-umeclidin-vilanter (Trelegy Ellipta) 200-62.5-25 mcg blister with device    predniSONE (Deltasone) 10 mg tablet       Assessment and Plan / Recommendations:  Problem List Items Addressed This Visit    None     Asthma/COPD overlap  - START Trelegy 200 - 1 puff daily - rinse mouth after each use  - STOP Symbicort 160mcg 2 puffs twice daily  - START ipratropium- albuterol HFA or albuterol nebulizers every 4-6 hours as needed for shortness of breath  -START prednisone taper     Follow up in 3 months or sooner if needed      If you have any questions please call the office [phone]    Thank you for visiting the Pulmonary clinic today!   Zhanna Castillo CNP  [phone]

## 2024-12-09 NOTE — PROGRESS NOTES
Patient: Juan Carlos Nguyen    [id number]  : 1940 -- AGE 84 y.o.    Provider: SHAHZAD Arzola     Location [facility]   Service Date: 2024          [facility]  Follow Up Visit Note      HISTORY OF PRESENT ILLNESS     Juan Carlos Nguyen is a 84 y.o. male with a  h/o ABE and atrial fibrillation on Eliquis, CAD, MI (no stents), implantable loop recorder, TIA, COPD, essential tremor, BPH, HTN, HLD, former smoking, history of daily ETOH use, chronic sinusitis, nasal polyps s/p recent sinus surgery who presents for follow up.     24: ABE follow-up, PAP using every night with positive benefits. Reports doing very well with PAP therapy. Reports he is comfortable with current pressure and F&P Solo mask fit with chinstrap Denies any bothersome symptoms or air leak. Denies any nasal symptoms on CPAP including rhinorrhea, nasal congestion, postnasal drip or sinusitis. PERCEIVED BENEFITS OF PAP: refreshing sleep reduced daytime sleepiness decreased or no snoring better sleep quality. Typical sleep schedule 11pm- 6:30am. Naps daily mid afternoon for 1 hour which is refreshing. Denies difficulty falling asleep, staying asleep. Patient denies symptoms of narcolepsy, cataplexy, RLS, parasomnias, or shift-work disorder. ----> supplies renewed     24: Using dreamwear nasal pillow and chinstrap, contnue to notice air leak into eyes but not overly bothersome. Using every night with positive response. TST 7hrs/night. Patient brought in machine for compliance download.    23: NPV with concerns of  ABE management . He was diagnosed with ABE 20 years ago and has been compliant with CPAP since. Recently underwent bilateral endoscopic sinus surgery with ENT, Dr. Chan for massive polyps and CPAP was held after the procedure causing much difficulty with sleep. Reports he called ENT and begged him to be able to resume PAP use after about 6 days because he was  unable to sleep without it.     Reports last sleep study completed at [facility] about 10 years ago. Reports he thinks his current DME is [facility] but he is not 100% sure. Patient did not bring equipment with him to office visit today but is enrolled in [employer] on his phone, patient was able to share his data usage with me for review. Patient states he has been on CPAP 12CWP for many years, reports he is comfortable with current pressure but has had 40lb weight loss in the past 2 years. Using nasal pillow mask with chinstrap but still has air leak. Reports much more refreshing sleep in comparison to before starting APAP. Typical sleep schedule 11:00pm- 6:30am. Wakes refreshed. Swims 3x/week.  Typically naps a few times a week around noon for 60-90 min, naps are refreshing. Denies difficulty falling asleep, staying asleep. Patient denies symptoms of narcolepsy, cataplexy, RLS, parasomnias, and shift-work disorder.     Went back into afib yesterday per is apple watch. States he is asymptomatic, patient was able to swim this morning.     SLEEP ENVIRONMENT  Sleep status: sleeps with bed partner  Preferred sleep position: side  Room is dark: Yes  Room is quiet: Yes  Room is cool: Yes  Bed comfort: good    SLEEP HABITS  Caffeine consumption: Yes, 1 cups/day  Alcohol consumption: Yes, 1 drinks/day  Smoking: No  Marijuana: No  Sleep aids: denies    SLEEP ROS  Sleep Initiation: no problems going to sleep    Sleep Maintenance: wakes up about 2 times per night and easily returns to sleep after awakening  Problems staying asleep associated with: Problems Staying Asleep: nocturia    Breathing during sleep: no symptoms of snoring or concerns of breathing at night with PAP use       Sleep-related behaviors: dreams upon falling asleep  hypnagogic/hypnopompic hallucinations  sleep paralysis  sleep attacks  symptoms of cataplexy  sleep walking  kicking, punching, or acting out dreams  sleep eating  nightmares    Daytime  Symptoms  On awakening patient reports: waking refreshed    Patient denies daytime symptoms including: Denies: excessive daytime sleepiness sleep inertia late to work/school due to sleepiness irritability during the day difficulty with memory or concentration during the day feeling sleepy when driving  Fatigue: denies feeling fatigue    RLS screen: Patient denies RLS symptoms.    WEIGHT: gained 40lbs in 2 year by diet and exercise      Claustrophobia: No     ESS: 8      REVIEW OF SYSTEMS     All other systems have been reviewed and are negative.    ALLERGIES     Allergies   Allergen Reactions    Montelukast Other    Fenofibrate Unknown    Finasteride Itching    Iodinated Contrast Media Other    Sulfamethizole Itching    Sulfamethoxazole-Trimethoprim Rash and Other     FACIAL REDNESS AND SWEATING       MEDICATIONS     Current Outpatient Medications   Medication Sig Dispense Refill    albuterol 2.5 mg /3 mL (0.083 %) nebulizer solution Take 3 mL (2.5 mg) by nebulization every 6 hours if needed for wheezing. 75 mL 3    albuterol 90 mcg/actuation inhaler Inhale 1 puff every 4 hours if needed for wheezing. 18 g 6    aspirin 81 mg EC tablet       atorvastatin (Lipitor) 80 mg tablet TAKE ONE TABLET BY MOUTH EVERY DAY 90 tablet 1    calcium 500 mg calcium (1,250 mg) tablet Take 1 tablet (1,250 mg) by mouth once daily.      cholecalciferol (Vitamin D3) 5,000 Units tablet Take by mouth once daily.      dutasteride (Avodart) 0.5 mg capsule Take 1 capsule (0.5 mg) by mouth once daily.      Eliquis 5 mg tablet TAKE ONE TABLET BY MOUTH TWO TIMES A  tablet 0    famotidine (Pepcid) 40 mg tablet TAKE ONE TABLET BY MOUTH TWO TIMES A  tablet 0    fluticasone-umeclidin-vilanter (Trelegy Ellipta) 200-62.5-25 mcg blister with device Inhale 1 puff once daily. RINSE MOUTH AFTER EACH USE TO AVOID ORAL THRUSH. 28 each 3    guaiFENesin (Mucinex) 1,200 mg tablet extended release 12hr Take 1 tablet (1,200 mg) by mouth every 12  hours if needed.      ipratropium-albuteroL (Duo-Neb) 0.5-2.5 mg/3 mL nebulizer solution Take 3 mL by nebulization 4 times a day as needed for wheezing or shortness of breath. 90 mL 5    loratadine (Claritin) 10 mg tablet Take 1 tablet (10 mg) by mouth once daily.      omega 3-dha-epa-fish oil (Fish OiL) 1,200 (144-216) mg capsule Take by mouth.      pantoprazole (ProtoNix) 40 mg EC tablet TAKE ONE TABLET (40 mg) BY MOUTH DAILY. do not crush, chew, or split 90 tablet 0    propranolol (Inderal) 20 mg tablet TAKE ONE TABLET BY MOUTH TWO TIMES A DAY 90 tablet 0    sotalol (Betapace) 80 mg tablet TAKE ONE TABLET (80 mg) BY MOUTH EVERY 12 HOURS 180 tablet 0    terazosin (Hytrin) 10 mg capsule TAKE ONE CAPSULE (10 mg) BY MOUTH DAILY AT BEDTIME (Patient taking differently: And 5 mg in am) 90 capsule 0    ursodiol (Actigall) 300 mg capsule TAKE ONE CAPSULE BY MOUTH TWO TIMES A  capsule 1    valsartan (Diovan) 80 mg tablet TAKE ONE TABLET BY MOUTH DAILY for blood pressure 30 tablet 0    zinc gluconate 50 mg tablet Take by mouth.      budesonide-formoteroL (Symbicort) 160-4.5 mcg/actuation inhaler Inhale 2 puffs 2 times a day. Rinse mouth with water after use to reduce aftertaste and incidence of candidiasis. Do not swallow. (Patient not taking: Reported on 12/16/2024) 10.2 g 3    predniSONE (Deltasone) 10 mg tablet Take 4 tabs (40mg) daily for 3 days, then 3 tabs (30mg) daily for 3 days,  2 tabs (20mg) daily for 3 days,  1 tab (10 mg) daily for 3 days. (Patient not taking: Reported on 12/16/2024) 30 tablet 0    predniSONE (Deltasone) 20 mg tablet Take 1 tablet (20 mg) by mouth every 12 hours. (Patient not taking: Reported on 12/16/2024)       No current facility-administered medications for this visit.       PAST HISTORIES     PERTINENT PAST MEDICAL HISTORY: See HPI    PERTINENT PAST SURGICAL HISTORY for Sleep Medicine:  sinus surgery      PERTINENT FAMILY HISTORY for Sleep Medicine:  sleep apnea on PAP- brother and  "sister     PERTINENT SOCIAL HISTORY:  He  reports that he has never smoked. He has never used smokeless tobacco. He reports current alcohol use. He reports that he does not use drugs. He currently lives with a partner or spouse and retired from work. 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 No significant changes unless documented in the pertinent chart section. Updates made when necessary.     PHYSICAL EXAM       VITAL SIGNS: /65   Pulse 55   Temp 35.9 °C (96.6 °F)   Resp 16   Ht 1.854 m (6' 1\")   Wt 109 kg (240 lb)   SpO2 95%   BMI 31.66 kg/m²      CURRENT WEIGHT:   Vitals:    12/16/24 1307   Weight: 109 kg (240 lb)         Body mass index is 31.66 kg/m².  PREVIOUS WEIGHTS:  Wt Readings from Last 3 Encounters:   12/16/24 109 kg (240 lb)   12/05/24 108 kg (238 lb)   11/20/24 109 kg (240 lb)     Constitutional: Alert and oriented, cooperative, no obvious distress   HEENT: Non icteric or anemic, EOM WNL bilaterally   Neck: Supple, no JVD, no goiter, no adenopathy, no rigidity     RESULTS/DATA     No results found for: \"IRON\", \"TRANSFERRIN\", \"IRONSAT\", \"TIBC\", \"FERRITIN\"    Bicarbonate   Date Value Ref Range Status   11/15/2024 28 21 - 32 mmol/L Final       ASSESSMENT/PLAN     Mr. Nguyen is a 84 y.o. male and He was referred to the [facility] for evaluation of ABE    Problem List, Orders, Assessment, Recommendations:    #ABE, severe   - PSG 4/11/2000 at [facility]-showing severe ABE with AHI 43.6 no significant oxygen desaturation seen  - PAP titration 6/1/2000 [facility]-recommend CPAP 10 cm H2O  - PAP titration 6/29/2012 at [facility]- Obtimal CPAP pressure 12 CWP  - Retrieved and personally reviewed recent PAP adherence download data today. See HPI.  - excellent compliance to PAP therapy, residual AHI at goal, " and good control of ABE symptoms  - Continue current pressure 12 CWP  - F&P Solo mask with chinstrap  - renew PAP supply orders, order placed to DME- [facility]  - retesting not clinically indicated at this time, will continue to assess   - diet, exercise, and weight loss were emphasized today in clinic, as were non-supine sleep, avoiding alcohol in the late evening, and driving or operating heavy machinery when sleepy. Patient verbalized understanding.     # ATRIAL FIBRILLATION/HTN  -Status post cardioversion 2020, loop recorder  - doing well  - discuss relationship of ABE and Afib in regards to treatment, encouraged nightly use of CPAP as well as this is a long-term treatment, verbalized understanding  - on meds per Cardio (OAT with Eliquis)   - Defer management to Cardio/EP Dr. Hopkins    #Obesity  BMI Readings from Last 1 Encounters:   12/16/24 31.66 kg/m²     - Encouraged healthy weight loss via diet and exercise  - Weight loss can help in the long term treatment of ABE.  - Defer management to PCP     All of patient's questions were answered. He verbalizes understanding and agreement with my assessment and plan.    Disposition    Follow up in 12 months

## 2024-12-13 ENCOUNTER — APPOINTMENT (OUTPATIENT)
Dept: PULMONOLOGY | Facility: CLINIC | Age: 84
End: 2024-12-13
Payer: MEDICARE

## 2024-12-13 ENCOUNTER — APPOINTMENT (OUTPATIENT)
Dept: OTOLARYNGOLOGY | Facility: CLINIC | Age: 84
End: 2024-12-13
Payer: MEDICARE

## 2024-12-13 DIAGNOSIS — J33.9 NASAL POLYPS: Primary | ICD-10-CM

## 2024-12-13 PROCEDURE — 99212 OFFICE O/P EST SF 10 MIN: CPT | Performed by: OTOLARYNGOLOGY

## 2024-12-13 PROCEDURE — 1159F MED LIST DOCD IN RCRD: CPT | Performed by: OTOLARYNGOLOGY

## 2024-12-13 PROCEDURE — 1123F ACP DISCUSS/DSCN MKR DOCD: CPT | Performed by: OTOLARYNGOLOGY

## 2024-12-13 PROCEDURE — 1160F RVW MEDS BY RX/DR IN RCRD: CPT | Performed by: OTOLARYNGOLOGY

## 2024-12-13 NOTE — PROGRESS NOTES
The patient returns.  Was seeing him back today surveillance recheck history of sinonasal polyposis status post previous surgery.  He is doing very well.  Utilization of nasal steroids has gone along with helping control of symptomatology.  Remaining ENT inquiry is otherwise clear.  No other change in past medical surgical history.    Exam:  No acute distress.  The external ear structures appear normal. The ear canals patent and the tympanic membranes are intact without evidence of air-fluid levels, retraction, or congenital defects.  Anterior rhinoscopy notes essentially a midline nasal septum. Examination is noted for normal healthy mucosal membranes without any evidence of lesions, polyps, or exudate. The tongue is normally mobile. There are no lesions on the gingiva, buccal, or oral mucosa. There are no oral cavity masses.  The neck is negative for mass lymphadenopathy. The trachea and parotid are clear. The thyroid bed is grossly unremarkable. The salivary gland structures are grossly unremarkable.    Assessment and plan:  Status post previous endoscopic sinus surgery for polyps.  Continues to utilize nasal steroids and we do recommend that on a daily basis.  Recheck with me if all is well in 1 year, sooner with issue.  All questions were answered in this regard accordingly.

## 2024-12-16 ENCOUNTER — APPOINTMENT (OUTPATIENT)
Dept: SLEEP MEDICINE | Facility: CLINIC | Age: 84
End: 2024-12-16
Payer: MEDICARE

## 2024-12-16 VITALS
DIASTOLIC BLOOD PRESSURE: 65 MMHG | SYSTOLIC BLOOD PRESSURE: 119 MMHG | TEMPERATURE: 96.6 F | RESPIRATION RATE: 16 BRPM | HEIGHT: 73 IN | BODY MASS INDEX: 31.81 KG/M2 | OXYGEN SATURATION: 95 % | HEART RATE: 55 BPM | WEIGHT: 240 LBS

## 2024-12-16 DIAGNOSIS — I48.0 PAROXYSMAL ATRIAL FIBRILLATION (MULTI): ICD-10-CM

## 2024-12-16 DIAGNOSIS — E66.811 OBESITY (BMI 30.0-34.9): ICD-10-CM

## 2024-12-16 DIAGNOSIS — N40.0 BENIGN PROSTATIC HYPERPLASIA WITHOUT LOWER URINARY TRACT SYMPTOMS: ICD-10-CM

## 2024-12-16 DIAGNOSIS — Z78.9 NONSMOKER: ICD-10-CM

## 2024-12-16 DIAGNOSIS — G47.33 OBSTRUCTIVE SLEEP APNEA: Primary | ICD-10-CM

## 2024-12-16 PROCEDURE — G2211 COMPLEX E/M VISIT ADD ON: HCPCS | Performed by: NURSE PRACTITIONER

## 2024-12-16 PROCEDURE — 1159F MED LIST DOCD IN RCRD: CPT | Performed by: NURSE PRACTITIONER

## 2024-12-16 PROCEDURE — 3078F DIAST BP <80 MM HG: CPT | Performed by: NURSE PRACTITIONER

## 2024-12-16 PROCEDURE — 1036F TOBACCO NON-USER: CPT | Performed by: NURSE PRACTITIONER

## 2024-12-16 PROCEDURE — 1123F ACP DISCUSS/DSCN MKR DOCD: CPT | Performed by: NURSE PRACTITIONER

## 2024-12-16 PROCEDURE — 99214 OFFICE O/P EST MOD 30 MIN: CPT | Performed by: NURSE PRACTITIONER

## 2024-12-16 PROCEDURE — 3074F SYST BP LT 130 MM HG: CPT | Performed by: NURSE PRACTITIONER

## 2024-12-16 PROCEDURE — 1160F RVW MEDS BY RX/DR IN RCRD: CPT | Performed by: NURSE PRACTITIONER

## 2024-12-16 ASSESSMENT — SLEEP AND FATIGUE QUESTIONNAIRES
HOW LIKELY ARE YOU TO NOD OFF OR FALL ASLEEP IN A CAR, WHILE STOPPED FOR A FEW MINUTES IN TRAFFIC: WOULD NEVER DOZE
HOW LIKELY ARE YOU TO NOD OFF OR FALL ASLEEP WHILE WATCHING TV: SLIGHT CHANCE OF DOZING
HOW LIKELY ARE YOU TO NOD OFF OR FALL ASLEEP WHILE SITTING AND READING: MODERATE CHANCE OF DOZING
HOW LIKELY ARE YOU TO NOD OFF OR FALL ASLEEP WHEN YOU ARE A PASSENGER IN A CAR FOR AN HOUR WITHOUT A BREAK: SLIGHT CHANCE OF DOZING
HOW LIKELY ARE YOU TO NOD OFF OR FALL ASLEEP WHILE SITTING QUIETLY AFTER LUNCH WITHOUT ALCOHOL: SLIGHT CHANCE OF DOZING
SITING INACTIVE IN A PUBLIC PLACE LIKE A CLASS ROOM OR A MOVIE THEATER: WOULD NEVER DOZE
HOW LIKELY ARE YOU TO NOD OFF OR FALL ASLEEP WHILE SITTING AND TALKING TO SOMEONE: WOULD NEVER DOZE
ESS-CHAD TOTAL SCORE: 8
HOW LIKELY ARE YOU TO NOD OFF OR FALL ASLEEP WHILE LYING DOWN TO REST IN THE AFTERNOON WHEN CIRCUMSTANCES PERMIT: HIGH CHANCE OF DOZING

## 2024-12-16 NOTE — TELEPHONE ENCOUNTER
Pt would like call back to discuss med that keeps getting mixed up. Pt takes Terazosin 10mg and 5mg. He is out of the 5mg and was told by pharm the rx was cancelled. He said everytime a refill is sent in one of the doses is cancelled, but he takes both doses. Please call pt regarding refill.

## 2024-12-16 NOTE — PATIENT INSTRUCTIONS
[facility]   [address] [facility]  [address] [facility] [address]       NAME: Juan Carlos Nguyen   DATE: 12/16/24    Your Sleep Provider Today: SHAHZAD Arzola  Your Primary Care Physician: Mj Moreira MD       DIAGNOSIS:   1. Obstructive sleep apnea  Positive Airway Pressure (PAP) Therapy      2. Paroxysmal atrial fibrillation (Multi)            Thank you for coming to the Sleep Medicine Clinic today! Your sleep medicine provider today was: SHAHZAD Arzola Below is a summary of your treatment plan, other important information, and our contact numbers:      TREATMENT PLAN     - Follow-up in 12 months.  - If not already done, sign up for 'My Chart' and send prescription requests or messages through this    Annual Reminders About Your Sleep Apnea    Your sleep apnea is well controlled based on reviewing your PAP Data Report. A supply renewal prescription has been sent to your DME company.     General Reminders:  Continue current machine settings. Continue using machine every night. Need at least 4 hours daily usage.   Remember to clean your mask, tubings, and water chamber regularly as instructed.  Know the replacement schedule of your supplies/ accessories and contact your DME (durable medical equipment) provider if you are due for them.  Avoid driving or operating heavy machinery when drowsy. A person driving while sleepy is 5 times more likely to have an accident. If you feel sleepy, pull over and take a short power nap (sleep for less than 30 minutes). Otherwise, ask somebody to drive you.    Follow-up sooner through MyChart or calling our office if you have any of the following symptoms:  Snoring or stopping breathing while using the machine  Recurrence of fatigue, sleepiness, insomnia, and other symptoms you had prior using machine  Persistent or worsening fatigue or sleepiness despite regular use of machine  Issues tolerating the  machine like bloating sensation, air hunger, too much hot air, too much pressure, taking off mask without recall in the middle of sleep, etc.     Other conservative measures to improve sleep apnea:  Losing weight can lead to some improvement of ABE which means you will need lower pressures in machine to control your ABE. In some patients, they don't need to use the machine after bariatric surgery. Hence, consider medical and/or surgical weight loss especially if your BMI is more than 35.  Avoiding alcohol, sedative-hypnotics, and sedating medications is imperative as these substances can worsen snoring and sleep apnea  If you have nasal congestion or seasonal allergies, improving your breathing through the nose is critical for treating sleep apnea, tolerating CPAP, and improving your sleep; hence, using intranasal steroid spray like Flonase might help. Make sure you know the proper way to use it.  Stay off your back when sleeping.    Common issues with PAP machine:  Mask gets dislodged when turning to the side: Consider getting a CPAP pillow or switching to a mask with hose on top.     Dry mouth:  Your machine has built-in humidifier that heats up the air to prevent dry mouth. It can be adjusted to your comfort. You can try that first and increase setting one level one night at a time to check which setting is comfortable and effective in lessening dry mouth. If dry mouth persists despite humidity setting adjustment, may apply OTC Biotene gel over the gums at bedtime.  If Biotene gel is not effective, consider trying XEROSTOM gel from [employer].  Also, eliminate or reduce dose of meds that can cause dry mouth if possible. Lastly, may try getting a separate room humidifier machine.    Airleaks: Please call DME as they may need to adjust your mask or refit you with a different kind of mask. In addition, you can ask DME for tips on getting a good mask seal and mask fit.     Difficulty tolerating the mask: Contact your  "DME to try a different kind of mask and/or call office to get a referral to Sleep Psychologist for CPAP desensitization. CPAP desensitization technique is a set of strategies that helps patient cope with claustrophobia and anxiety related to wearing mask. Alternatively, we can do a daytime mini-sleep study called PAP-nap trial wherein you will try on different kinds of mask and the sleep technician will try different pressure settings on CPAP and BPAP machines to see which specific pressure is tolerable and comfortable for you.     Water droplets or moisture within the hose and/or mask: This is called rain-out and it is caused by condensation of too much heated humidity on the cooler walls of the hose. If you have rain-out, turn down humidity settings or get a heated hose. If you already have a heated hose, turn up the \"tube temperature\" of the heated hose. Alternatively, if you don't want to get a heated hose or warmer air, may wrap the CPAP hose with stockings to keep it somewhat warm. Also, you need to place the machine on the floor and lower the hose so that water won't travel upward towards your mask.       Maintaining your CPAP/BPAP device:    The humidification chamber (aka water tank or water chamber) needs to be filled with distilled water to prevent buildup of white deposits in the future. If you cannot find distilled water, you can use tap water but expect to have white deposits buildup seen after prolonged use with tap water. If you start seeing white deposits on the water chamber, you can clean it by filling it with equal parts of distilled white vinegar and water. Let the vinegar-water mixture sit for 2 hours, and then rinse it with running tap water. Clean with soap and water then let it dry.     You should try to keep your machine clean in order to work well. Here are some tips to clean PAP supplies / accessories:    Clean the humidification chamber (aka water tank) as well as your mask and tubing at " least once a week with soap and water.   Alternatively, you can fill a sink or basin with warm water and add a little mild detergent, like Ivory dish soap. Gently wipe your supplies with the soapy water to free all the oils and dirt that may have collected. Once that's done, rinse these items with clean water until the soap is gone and let them air dry. You can hang your tubing over the curtain rachid in your bathroom so that it dries.  The mask insert (part of the mask that has contact with your skin) needs to be cleaned with soap and water daily. Another option is to wipe them down with CPAP wipes or baby wipes.    You should replace your mask and tubing frequently in order to prevent bacteria buildup, machine damage, and mask seal issues. The older the mask and hose, the high likelihood that there is bacteria buildup in it especially if they are not cleaned regularly. Dirty filters damage machines because build-up of dust and contaminants can cause machine to over-heat, and in time, damage the motor of machine. Cushions lose their seal over time as most masks are made of plastic and silicone while headgear is made of neoprene. These materials will break down with age and frequent use. Here is the recommended replacement schedule for PAP supplies / accessories:    Twice a month- disposable filters and cushions for nasal mask or nasal pillows.  Once a month- cushion for full face mask  Every 3 months- mask with headgear and PAP tubing (standard or heated hose)  Every 6 months- reusable filter, water chamber, and chin strap     Other useful information:    Some people do not put water in the tank while other people prefers to put water in the tank to prevent mouth dryness. Try to experiment to determine which is more comfortable for you.   In general, new machines have 2 years warranty on parts while health insurance allows you to have a new machine once every 5 years.      IMPORTANT INFORMATION     Call 911 for  "medical emergencies.  Our offices are generally open from Monday-Friday, 9 am - 5 pm.  If you need to get in touch with me, you may either call me/my team (number is below) or you can use [employer].  If a referral for a test, for CPAP, or for another specialist was made, and you have not heard about scheduling this within a week, please call scheduling at [phone]).  If you are unable to make your appointment for clinic or an overnight study, kindly call the office at least 48 hours in advance to cancel and reschedule.  If you are on CPAP, please bring your device's card or the device to each clinic appointment.   There are no supporting services by either the sleep doctors or their staff on weekends and Holidays, or after 5 PM on weekdays.     PRESCRIPTIONS     We require 7 days advanced notice for prescription refills. If we do not receive the request in this time, we cannot guarantee that your medication will be refilled in time.      IMPORTANT PHONE NUMBERS     [facility]: [phone]  [employer] ([employer]): [phone]  [employer] (DME): [phone]   (DME): [phone]    CONTACTING YOUR SLEEP MEDICINE PROVIDER AND SLEEP TEAM      For issues with your machine or mask interface, please call your DME provider first. [employer] stands for durable medical company. [employer] is the company who provides you the machine and/or PAP supplies / accessories.   To schedule, cancel, or reschedule SLEEP STUDY APPOINTMENTS, please call the Main Phone Line at [phone] - option 3.   To schedule, cancel, or reschedule CLINIC APPOINTMENTS, you can do it in \"MyChart\", call [phone] for [facility] office , [phone] for [facility] office to speak with my on site staff, or call the Main Phone Line at [phone] - option 2  For CLINICAL QUESTIONS or MEDICATION REFILLS, please call direct line for Adult Sleep Nurses at [phone].   Lastly, you can also " "send a message directly to your provider through \"My Chart\", which is the email service through your  Records Account: [email or web address]     Adult Sleep Nurses (Sumi Looney, RN and Theresa Lara, RN):  For clinical questions and refilling prescriptions: [phone]  Email sleep diaries and other documents at: [email or web address]    Office locations for Xochitl Fontanez NP:    [facility]   [id number] [place] [address] [address]   [address]  [place], [place] [id number] [phone]    [address] [facility]  [address]  [place], [place] [address] [phone]    [address] [facility]  [address]  [place], [place] [id number] [phone]    OUR SLEEP TESTING LOCATIONS     Our team will contact you to schedule your sleep study, however, you can contact us as follow:  Main Phone Line (scheduling only): [phone]), option 3    Sleep Testing Locations:   [place] (18 years and older): [address] [place] [address], 2nd floor   [place] (18 years and older): [address] [facility]; 4th floor  After hours line: [phone]  [facility] (18 years and older) at [place]: [address] [place] [address]  After hours line: [phone]   [facility] (Main campus: All ages): [facility], 6th floor. After hours line: [phone]   [place] (5 years and older; younger considered on case-by-case basis): [address] [place] [address]; [employer] [facility] [address], [address]. Scheduling  After hours line: [phone]       Here at [facility], we wish you a restful sleep!    Your sleep medicine provider for this visit was: Xochitl Fontanez, APRN-CNP   "

## 2024-12-17 DIAGNOSIS — I49.9 CARDIAC ARRHYTHMIA, UNSPECIFIED CARDIAC ARRHYTHMIA TYPE: ICD-10-CM

## 2024-12-17 RX ORDER — PROPRANOLOL HYDROCHLORIDE 20 MG/1
20 TABLET ORAL 2 TIMES DAILY
Qty: 180 TABLET | Refills: 1 | Status: SHIPPED | OUTPATIENT
Start: 2024-12-17

## 2024-12-17 RX ORDER — TERAZOSIN 5 MG/1
5 CAPSULE ORAL NIGHTLY
Qty: 90 CAPSULE | Refills: 3 | Status: SHIPPED | OUTPATIENT
Start: 2024-12-17

## 2024-12-20 ENCOUNTER — APPOINTMENT (OUTPATIENT)
Dept: PULMONOLOGY | Facility: CLINIC | Age: 84
End: 2024-12-20
Payer: MEDICARE

## 2024-12-20 DIAGNOSIS — I49.9 CARDIAC ARRHYTHMIA, UNSPECIFIED CARDIAC ARRHYTHMIA TYPE: ICD-10-CM

## 2024-12-20 RX ORDER — APIXABAN 5 MG/1
TABLET, FILM COATED ORAL
Qty: 180 TABLET | Refills: 0 | Status: SHIPPED | OUTPATIENT
Start: 2024-12-20

## 2025-01-03 ENCOUNTER — HOSPITAL ENCOUNTER (OUTPATIENT)
Dept: CARDIOLOGY | Facility: HOSPITAL | Age: 85
Discharge: HOME | End: 2025-01-03
Payer: MEDICARE

## 2025-01-03 DIAGNOSIS — Z95.818 IMPLANTABLE LOOP RECORDER PRESENT: ICD-10-CM

## 2025-01-03 PROCEDURE — 93298 REM INTERROG DEV EVAL SCRMS: CPT

## 2025-01-08 ENCOUNTER — HOSPITAL ENCOUNTER (OUTPATIENT)
Dept: RADIOLOGY | Facility: HOSPITAL | Age: 85
Discharge: HOME | End: 2025-01-08
Payer: MEDICARE

## 2025-01-08 DIAGNOSIS — D17.71 BENIGN LIPOMATOUS NEOPLASM OF KIDNEY: ICD-10-CM

## 2025-01-08 PROCEDURE — 76377 3D RENDER W/INTRP POSTPROCES: CPT | Performed by: RADIOLOGY

## 2025-01-08 PROCEDURE — 76377 3D RENDER W/INTRP POSTPROCES: CPT

## 2025-01-08 PROCEDURE — 74178 CT ABD&PLV WO CNTR FLWD CNTR: CPT | Performed by: RADIOLOGY

## 2025-01-08 PROCEDURE — 2550000001 HC RX 255 CONTRASTS: Performed by: UROLOGY

## 2025-01-08 RX ADMIN — IOHEXOL 100 ML: 350 INJECTION, SOLUTION INTRAVENOUS at 16:56

## 2025-01-15 ENCOUNTER — APPOINTMENT (OUTPATIENT)
Dept: PULMONOLOGY | Facility: CLINIC | Age: 85
End: 2025-01-15
Payer: MEDICARE

## 2025-01-15 VITALS
HEIGHT: 73 IN | BODY MASS INDEX: 32.6 KG/M2 | SYSTOLIC BLOOD PRESSURE: 110 MMHG | DIASTOLIC BLOOD PRESSURE: 62 MMHG | OXYGEN SATURATION: 96 % | HEART RATE: 74 BPM | TEMPERATURE: 96.9 F | WEIGHT: 246 LBS

## 2025-01-15 DIAGNOSIS — J30.89 ENVIRONMENTAL AND SEASONAL ALLERGIES: ICD-10-CM

## 2025-01-15 DIAGNOSIS — J33.9 NASAL POLYPS: ICD-10-CM

## 2025-01-15 DIAGNOSIS — J45.51 SEVERE PERSISTENT ASTHMA WITH ACUTE EXACERBATION (MULTI): Primary | ICD-10-CM

## 2025-01-15 PROCEDURE — 99213 OFFICE O/P EST LOW 20 MIN: CPT | Performed by: STUDENT IN AN ORGANIZED HEALTH CARE EDUCATION/TRAINING PROGRAM

## 2025-01-15 PROCEDURE — 1036F TOBACCO NON-USER: CPT | Performed by: STUDENT IN AN ORGANIZED HEALTH CARE EDUCATION/TRAINING PROGRAM

## 2025-01-15 PROCEDURE — 3078F DIAST BP <80 MM HG: CPT | Performed by: STUDENT IN AN ORGANIZED HEALTH CARE EDUCATION/TRAINING PROGRAM

## 2025-01-15 PROCEDURE — 1159F MED LIST DOCD IN RCRD: CPT | Performed by: STUDENT IN AN ORGANIZED HEALTH CARE EDUCATION/TRAINING PROGRAM

## 2025-01-15 PROCEDURE — 1123F ACP DISCUSS/DSCN MKR DOCD: CPT | Performed by: STUDENT IN AN ORGANIZED HEALTH CARE EDUCATION/TRAINING PROGRAM

## 2025-01-15 PROCEDURE — 3074F SYST BP LT 130 MM HG: CPT | Performed by: STUDENT IN AN ORGANIZED HEALTH CARE EDUCATION/TRAINING PROGRAM

## 2025-01-15 RX ORDER — FLUTICASONE FUROATE, UMECLIDINIUM BROMIDE AND VILANTEROL TRIFENATATE 200; 62.5; 25 UG/1; UG/1; UG/1
1 POWDER RESPIRATORY (INHALATION) DAILY
Qty: 28 EACH | Refills: 3 | Status: SHIPPED | OUTPATIENT
Start: 2025-01-15

## 2025-01-15 ASSESSMENT — ENCOUNTER SYMPTOMS
CONFUSION: 0
ARTHRALGIAS: 0
ABDOMINAL DISTENTION: 0
TROUBLE SWALLOWING: 0
COLOR CHANGE: 0
WHEEZING: 0
COUGH: 1
HEADACHES: 0
CHILLS: 0
VOICE CHANGE: 1
FEVER: 0
DIARRHEA: 0
NAUSEA: 0
SHORTNESS OF BREATH: 1
DIZZINESS: 0
DIFFICULTY URINATING: 0
ABDOMINAL PAIN: 0
JOINT SWELLING: 0
SLEEP DISTURBANCE: 0
CONSTIPATION: 0
UNEXPECTED WEIGHT CHANGE: 0
SPEECH DIFFICULTY: 0
VOMITING: 0
LIGHT-HEADEDNESS: 0

## 2025-01-15 NOTE — PROGRESS NOTES
"      Department of Medicine  Division of Pulmonary, Critical Care, and Sleep Medicine  Follow-up  [facility]    84 y.o. male who presents today for a follow-up visit for asthma.    Their last office visit with DANIELE Clemens  12/5/2024.    I have independently interviewed and examined the patient in the office and reviewed available records. The finalized note is available in the electronic medical record for review by initial referring physician or provider.    Interval History (1/15/2025):    Well controlled, started trelegy beginning    Patient is an 84-year-old  male presenting to the clinic today for follow-up of severe persistent asthma.  Patient normally follows with DANIELE Clemens and at the patient's last appointment in early December was changed from Symbicort to Trelegy.  Patient reports that overall he feels that the Trelegy is helping however he has noticed some new symptoms including a frequent cough which occurs about once per hour.  Patient feels like he is having to \"clear his throat\" when having these coughing episodes.  Usually the episodes last 1 or 2 coughs and then subside.  Symptoms are worse in the morning but can happen randomly throughout the day.  He notes no specific environment where the cough is more frequent.  He also carries a diagnosis of seasonal allergies and nasal polyposis.  He manages the symptoms with Nasacort as well as Claritin in the evening.  Patient also takes Robitussin DM every evening and Mucinex DM to help control the cough.  As a consequence of this frequent cough he is also noticed some hoarseness.    Patient states he is able to continue with exercise and go swimming on a regular basis, 1 hour this morning he typically does not use his albuterol inhaler prior to physical activity and states that he rarely uses his albuterol inhaler, may be 1-2 times every few months.  Patient is not had any hospitalizations for his asthma, but did " present to the emergency department in early December complaints of shortness of breath and had been diagnosed as an acute exacerbation of asthma.  The patient's eosinophil count at that time showed a count of 870, previous measurements available but there are several measuring greater than 100, consistent with the Th2 response.    Immunization History:  Immunization History   Administered Date(s) Administered    COVID-19, mRNA, LNP-S, PF, 30 mcg/0.3 mL dose 01/26/2021, 02/16/2021, 09/27/2021    Flu vaccine (IIV4), preservative free *Check age/dose* 12/10/2013    Flu vaccine, quadrivalent, high-dose, preservative free, age 65y+ (FLUZONE) 10/10/2023    Flu vaccine, trivalent, preservative free, HIGH-DOSE, age 65y+ (Fluzone) 10/25/2024    Flu vaccine, trivalent, preservative free, age 6 months and greater (Fluarix/Fluzone/Flulaval) 11/17/2012, 10/18/2014    Influenza, Unspecified 12/01/2011, 11/16/2015, 02/20/2016, 12/20/2016, 10/12/2017, 10/12/2018, 11/11/2019, 10/08/2020, 10/23/2020, 09/27/2021, 09/28/2022    Influenza, seasonal, injectable 11/01/2022, 10/16/2023    Moderna COVID-19 vaccine, 12 years and older (50mcg/0.5mL)(Spikevax) 10/25/2024    Pfizer COVID-19 vaccine, 12 years and older, (30mcg/0.3mL) (Comirnaty) 11/18/2023    Pfizer Gray Cap SARS-CoV-2 05/13/2022    Pfizer Purple Cap SARS-CoV-2 05/01/2022    Pneumococcal conjugate vaccine, 13-valent (PREVNAR 13) 01/15/2016    Pneumococcal polysaccharide vaccine, 23-valent, age 2 years and older (PNEUMOVAX 23) 08/14/2008, 04/12/2021, 10/01/2022    RSV, 60 Years And Older (AREXVY) 10/23/2023    Td vaccine, age 7 years and older (TENIVAC) 06/15/2010    Tdap vaccine, age 7 year and older (BOOSTRIX, ADACEL) 04/21/2022    Zoster vaccine, recombinant, adult (SHINGRIX) 07/09/2022, 09/28/2022    Zoster, live 02/21/2013       Family History:  Family History   Problem Relation Name Age of Onset    Diabetes Mother      Cancer Mother      Breast cancer Mother      Diabetes  Father      Cancer Father      Prostate cancer Father      Cancer Sister      Breast cancer Sister      Other (oral cancer) Brother      Cancer Daughter      Breast cancer Daughter      Other (breast cancer in female) Other         Social History:  Social History     Socioeconomic History    Marital status:    Tobacco Use    Smoking status: Never    Smokeless tobacco: Never   Vaping Use    Vaping status: Never Used   Substance and Sexual Activity    Alcohol use: Yes    Drug use: Never    Sexual activity: Defer       Current Medications:  Current Outpatient Medications   Medication Instructions    albuterol 90 mcg/actuation inhaler 1 puff, inhalation, Every 4 hours PRN    albuterol 2.5 mg, nebulization, Every 6 hours PRN    aspirin 81 mg EC tablet     atorvastatin (LIPITOR) 80 mg, oral, Daily    calcium 500 mg calcium (1,250 mg) tablet 1 tablet, Daily    cholecalciferol (Vitamin D3) 5,000 Units tablet Daily    dutasteride (AVODART) 0.5 mg, Daily    Eliquis 5 mg tablet TAKE ONE TABLET BY MOUTH TWO TIMES A DAY    famotidine (PEPCID) 40 mg, oral, 2 times daily    fluticasone-umeclidin-vilanter (Trelegy Ellipta) 200-62.5-25 mcg blister with device 1 puff, inhalation, Daily, RINSE MOUTH AFTER EACH USE TO AVOID ORAL THRUSH.    ipratropium-albuteroL (Duo-Neb) 0.5-2.5 mg/3 mL nebulizer solution 3 mL, nebulization, 4 times daily PRN    loratadine (CLARITIN) 10 mg, Daily    Mucinex 1,200 mg, Every 12 hours PRN    omega 3-dha-epa-fish oil (Fish OiL) 1,200 (144-216) mg capsule Take by mouth.    pantoprazole (ProtoNix) 40 mg EC tablet TAKE ONE TABLET (40 mg) BY MOUTH DAILY. do not crush, chew, or split    propranolol (INDERAL) 20 mg, oral, 2 times daily    sotalol (Betapace) 80 mg tablet TAKE ONE TABLET (80 mg) BY MOUTH EVERY 12 HOURS    terazosin (Hytrin) 10 mg capsule TAKE ONE CAPSULE (10 mg) BY MOUTH DAILY AT BEDTIME    terazosin (HYTRIN) 5 mg, oral, Nightly    ursodiol (ACTIGALL) 300 mg, oral, 2 times daily     "valsartan (DIOVAN) 80 mg, oral, Daily, for blood pressure    zinc gluconate 50 mg tablet Take by mouth.        Drug Allergies/Intolerances:  Allergies   Allergen Reactions    Montelukast Other    Fenofibrate Unknown    Finasteride Itching    Sulfamethizole Itching    Iodinated Contrast Media Nausea/vomiting    Sulfamethoxazole-Trimethoprim Rash and Other     FACIAL REDNESS AND SWEATING        Review of Systems:  Review of Systems   Constitutional:  Negative for chills, fever and unexpected weight change.   HENT:  Positive for voice change. Negative for congestion and trouble swallowing.    Respiratory:  Positive for cough and shortness of breath (At baseline). Negative for wheezing.    Cardiovascular:  Negative for chest pain.   Gastrointestinal:  Negative for abdominal distention, abdominal pain, constipation, diarrhea, nausea and vomiting.   Genitourinary:  Negative for difficulty urinating.   Musculoskeletal:  Negative for arthralgias and joint swelling.   Skin:  Negative for color change.   Neurological:  Negative for dizziness, speech difficulty, light-headedness and headaches.   Psychiatric/Behavioral:  Negative for confusion and sleep disturbance.         All other review of systems are negative and/or non-contributory.    Physical Examination:  /62   Pulse 74   Temp 36.1 °C (96.9 °F)   Ht 1.854 m (6' 1\")   Wt 112 kg (246 lb)   SpO2 96%   BMI 32.46 kg/m²      Measured SpO2 is with ambient air at rest    Physical Exam  Constitutional:       General: He is awake.      Appearance: Normal appearance. He is overweight.      Comments: Pleasant gentleman appearing well and looking comfortable on room air   HENT:      Head: Normocephalic and atraumatic.      Nose: Nose normal.      Mouth/Throat:      Mouth: Mucous membranes are moist.   Eyes:      Pupils: Pupils are equal, round, and reactive to light.   Neck:      Thyroid: No thyroid mass.      Trachea: Phonation normal.   Cardiovascular:      Rate and " Rhythm: Normal rate and regular rhythm.      Heart sounds: Normal heart sounds. No murmur heard.     No gallop.   Pulmonary:      Effort: Pulmonary effort is normal. No respiratory distress.      Breath sounds: Normal air entry. No decreased breath sounds, wheezing, rhonchi or rales.   Abdominal:      General: Bowel sounds are normal. There is no distension.      Palpations: Abdomen is soft.      Tenderness: There is no abdominal tenderness.   Musculoskeletal:      Cervical back: Neck supple.      Right lower leg: No edema.      Left lower leg: No edema.   Skin:     General: Skin is warm.      Capillary Refill: Capillary refill takes less than 2 seconds.   Neurological:      General: No focal deficit present.      Mental Status: He is alert and oriented to person, place, and time. Mental status is at baseline.      Cranial Nerves: Cranial nerves 2-12 are intact.      Motor: Motor function is intact.   Psychiatric:         Attention and Perception: Attention and perception normal.         Mood and Affect: Mood normal.         Speech: Speech normal.         Behavior: Behavior normal.         Pulmonary Function Test Results     Failed to redirect to the Timeline version of the [employer] Smart[employer].      Most recent pulmonary function testing I can find on record was performed at [facility] on 8/6/2013 which showed evidence of stage II/III obstruction with evidence of hyperinflation and a partial bronchodilator response.    Chest Radiograph     XR chest 1 view 12/04/2024    Narrative  * * *Final Report* * *    DATE OF EXAM: Dec  4 2024 10:15AM    LFX   [id number]  -  XR CHEST 1V FRONTAL PORT  / ACCESSION #  [id number]    PROCEDURE REASON: Shortness of breath    * * * * Physician Interpretation * * * *    EXAMINATION:  CHEST RADIOGRAPH (PORTABLE SINGLE VIEW AP)    Exam Date/Time:  12/4/2024 10:15 AM  CLINICAL HISTORY: Shortness of breath  MQ:  XCPR_5  Comparison:  Chest x-ray on 11/09/2020    RESULT:    Lines, tubes, and devices:   There appears be an implantable loop recorder  in place.    Lungs and pleura:  No consolidative opacities or mass lesion identified.  There is a stable focal calcification in the right upper lung.  No large  pleural effusions or pneumothorax.    Cardiomediastinal silhouette:  The cardiac silhouette is within normal  limits.  There is tortuosity of the thoracic aorta.    Other:  None.    Impression  IMPRESSION:    No acute findings.                    : CHELO  Transcribe Date/Time: Dec  4 2024 10:26A    Dictated by : CONCEPCIÓN ARANDA MD    This examination was interpreted and the report reviewed and  electronically signed by:  CONCEPCIÓN ARANDA MD on Dec  4 2024 10:28AM  EST      Echocardiogram     No results found for this or any previous visit from the past 365 days.       Chest CT Scan     No results found for this or any previous visit from the past 365 days.       Relevant Laboratory Tests       Assessment and Plan / Recommendations:  Problem List Items Addressed This Visit          ENT    Nasal polyps       Pulmonary and Pneumonias    Asthma - Primary    Relevant Medications    fluticasone-umeclidin-vilanter (Trelegy Ellipta) 200-62.5-25 mcg blister with device     Other Visit Diagnoses       Environmental and seasonal allergies               I reassured the patient that many patients that change from a metered-dose inhaler to a dry powder inhaler with experience symptoms of hoarseness and dry cough.  Some of the symptoms the consequence of him moving more air with Trelegy rather than Symbicort but I suspect most of the symptoms are the consequence from uncontrolled postnasal drip rather than an intolerance to the medication.    My suggestion was to focus on treating the postnasal drip a little more aggressively rather than changing medications at this time.    I provided him instructions on how to perform the sinus rinse and explained that he should perform the sinus rinse at least 2-3 times per week, prior  to using his nasal steroid.    I do think that he should continue taking his cough suppressants although he is welcome to continue Robitussin DM twice daily or Mucinex DM twice daily.  He does not need to alternate between the 2 medications.  I recommended that he continue using the Trelegy for now.  If the side effects of the medication did not subside within the next 4 to 6 weeks, we could try a therapeutic change to Breztri.  The event that neither of these interventions are effective, I would consider having the patient evaluated by an allergist or initiating a biologic such as Dupixent or Tezspire. With the patient's history of nasal polyposis and frequent elevations in the eosinophil count, and may be difficult to get his symptoms under control without more aggressive intervention.    Follow-up: 4-6 weeks    Ricky Reina MD, MSBS   Pulmonary/Critical Care  12:05 PM  01/15/25  [address] [address]  Office: [phone]  Fax: [phone]  Cell: [phone]

## 2025-01-15 NOTE — PATIENT INSTRUCTIONS
"Continue trelegy for now  Try adding the sinus rinse 2-3 times per week  If no better in 4 weeks try switching to breztri or referral to allergist.    Seasonal allergies management  Management of seasonal allergies can be incredibly frustrating.  Getting appropriate control of her symptoms starts with understanding process by which seasonal allergy symptoms are related.  To keep it simple, seasonal allergy symptoms are the direct consequence of being exposed to something in your environment that is triggering your immune system to generate the Th2 response, otherwise known as the \"allergy response\".  That \"something\" is referred to as an \"allergen\".  Some of these allergens can be tested for and others cannot.  Some patients may not have a true antibody mediated response to specific allergen, but when insufficient quantity, anything can trigger a Th2 response.    Most symptoms can be managed by using over-the-counter antihistamine medications which block the final step of this response histamine release.  However, antihistamines can fail because the Th2 response is very active and the exposure to your specific allergen is very high.    In order to reduce your exposure to your specific allergen, you may want to consider air purifier's for your home, removing any offending agents (such as new pets) or wearing a mask.  Alternatively, you can reduce the amount of allergen present in your nose by rinsing out your nose periodically.    Your exposure to the allergen has been reduced, we still need to decrease the severity of the Th2 response.  We can accomplish this using nasal steroids after the exposure has been lessened.    By combining the reduction in exposure to your allergen and decreasing the severity of the Th2 response with nasal steroids, the amount of antihistamine being released should decrease in the antihistamine by itself should work more effectively.  Start by taking an over the counter antihistamine of " your choice. Options include…  Loratadine (Claritin) 10 mg daily  Can increase to twice a day  Cetirizine (Zyrtec) 10 mg daily  Can increase to twice a day  Levocetirizine (Xyzal) 5 mg in the evening  Can increase to twice a day  Fexofenadine (Allegra) 60 mg twice daily  Fexofenadine extended release (Allegra 24-hour) 180 mg once daily  If your symptoms are more severe, consider performing a nasal rinse  Recommend using the “[employer] sinus rinse kit”  Make sure you use bottled or distilled water with the salt packets in the kit  DO NOT USE TAP WATER  Video Instructions: https://youtu.be/NI8fxNo8Ic0?si=[email or web address] or just search “[employer] Sinus Rinse Video”  There are other devices available for purchase but they are more expensive  You can also consider adding nasal steroid after the nasal rinse. Options include…  Fluticasone (Flonase) 50 mcg/act 1 spray each nostril daily  Triamcinolone (Nasacort) 110 mcg/act 1 spray each nostril daily  Can increase up to two sprays twice a day

## 2025-01-17 ENCOUNTER — HOSPITAL ENCOUNTER (OUTPATIENT)
Dept: CARDIOLOGY | Facility: HOSPITAL | Age: 85
Discharge: HOME | End: 2025-01-17
Payer: MEDICARE

## 2025-01-17 DIAGNOSIS — Z95.818 IMPLANTABLE LOOP RECORDER PRESENT: ICD-10-CM

## 2025-01-24 ENCOUNTER — HOSPITAL ENCOUNTER (OUTPATIENT)
Dept: CARDIOLOGY | Facility: HOSPITAL | Age: 85
Discharge: HOME | End: 2025-01-24
Payer: MEDICARE

## 2025-01-24 DIAGNOSIS — Z95.818 IMPLANTABLE LOOP RECORDER PRESENT: ICD-10-CM

## 2025-01-28 DIAGNOSIS — K21.9 GASTROESOPHAGEAL REFLUX DISEASE WITHOUT ESOPHAGITIS: ICD-10-CM

## 2025-01-28 RX ORDER — FAMOTIDINE 40 MG/1
40 TABLET, FILM COATED ORAL 2 TIMES DAILY
Qty: 180 TABLET | Refills: 0 | Status: SHIPPED | OUTPATIENT
Start: 2025-01-28

## 2025-02-07 ENCOUNTER — HOSPITAL ENCOUNTER (OUTPATIENT)
Dept: CARDIOLOGY | Facility: HOSPITAL | Age: 85
Discharge: HOME | End: 2025-02-07
Payer: MEDICARE

## 2025-02-07 DIAGNOSIS — Z95.818 IMPLANTABLE LOOP RECORDER PRESENT: ICD-10-CM

## 2025-02-07 PROCEDURE — 93298 REM INTERROG DEV EVAL SCRMS: CPT

## 2025-02-07 PROCEDURE — 93298 REM INTERROG DEV EVAL SCRMS: CPT | Performed by: INTERNAL MEDICINE

## 2025-02-11 DIAGNOSIS — K21.9 GASTROESOPHAGEAL REFLUX DISEASE WITHOUT ESOPHAGITIS: ICD-10-CM

## 2025-02-11 DIAGNOSIS — I48.0 PAROXYSMAL ATRIAL FIBRILLATION (MULTI): ICD-10-CM

## 2025-02-11 DIAGNOSIS — N40.0 BENIGN PROSTATIC HYPERPLASIA WITHOUT LOWER URINARY TRACT SYMPTOMS: ICD-10-CM

## 2025-02-11 RX ORDER — SOTALOL HYDROCHLORIDE 80 MG/1
80 TABLET ORAL EVERY 12 HOURS
Qty: 180 TABLET | Refills: 0 | Status: SHIPPED | OUTPATIENT
Start: 2025-02-11

## 2025-02-11 RX ORDER — TERAZOSIN 10 MG/1
10 CAPSULE ORAL NIGHTLY
Qty: 90 CAPSULE | Refills: 0 | Status: SHIPPED | OUTPATIENT
Start: 2025-02-11

## 2025-02-11 RX ORDER — PANTOPRAZOLE SODIUM 40 MG/1
40 TABLET, DELAYED RELEASE ORAL DAILY
Qty: 90 TABLET | Refills: 0 | Status: SHIPPED | OUTPATIENT
Start: 2025-02-11

## 2025-02-13 DIAGNOSIS — E78.5 HYPERLIPIDEMIA, UNSPECIFIED HYPERLIPIDEMIA TYPE: ICD-10-CM

## 2025-02-13 RX ORDER — ATORVASTATIN CALCIUM 80 MG/1
80 TABLET, FILM COATED ORAL DAILY
Qty: 90 TABLET | Refills: 1 | Status: SHIPPED | OUTPATIENT
Start: 2025-02-13

## 2025-02-14 ENCOUNTER — APPOINTMENT (OUTPATIENT)
Dept: PULMONOLOGY | Facility: CLINIC | Age: 85
End: 2025-02-14
Payer: MEDICARE

## 2025-02-18 ENCOUNTER — HOSPITAL ENCOUNTER (OUTPATIENT)
Dept: CARDIOLOGY | Facility: HOSPITAL | Age: 85
Discharge: HOME | End: 2025-02-18
Payer: MEDICARE

## 2025-02-18 DIAGNOSIS — Z95.818 IMPLANTABLE LOOP RECORDER PRESENT: ICD-10-CM

## 2025-02-19 ENCOUNTER — OFFICE VISIT (OUTPATIENT)
Dept: PULMONOLOGY | Facility: CLINIC | Age: 85
End: 2025-02-19
Payer: MEDICARE

## 2025-02-19 VITALS
HEIGHT: 73 IN | SYSTOLIC BLOOD PRESSURE: 138 MMHG | TEMPERATURE: 97.7 F | DIASTOLIC BLOOD PRESSURE: 77 MMHG | RESPIRATION RATE: 16 BRPM | OXYGEN SATURATION: 97 % | HEART RATE: 55 BPM | BODY MASS INDEX: 31.94 KG/M2 | WEIGHT: 241 LBS

## 2025-02-19 DIAGNOSIS — J30.89 ENVIRONMENTAL AND SEASONAL ALLERGIES: ICD-10-CM

## 2025-02-19 DIAGNOSIS — J45.40 MODERATE PERSISTENT ASTHMA WITHOUT COMPLICATION (HHS-HCC): Primary | ICD-10-CM

## 2025-02-19 DIAGNOSIS — J33.9 NASAL POLYPS: ICD-10-CM

## 2025-02-19 PROCEDURE — 3078F DIAST BP <80 MM HG: CPT | Performed by: STUDENT IN AN ORGANIZED HEALTH CARE EDUCATION/TRAINING PROGRAM

## 2025-02-19 PROCEDURE — G2211 COMPLEX E/M VISIT ADD ON: HCPCS | Performed by: STUDENT IN AN ORGANIZED HEALTH CARE EDUCATION/TRAINING PROGRAM

## 2025-02-19 PROCEDURE — 1123F ACP DISCUSS/DSCN MKR DOCD: CPT | Performed by: STUDENT IN AN ORGANIZED HEALTH CARE EDUCATION/TRAINING PROGRAM

## 2025-02-19 PROCEDURE — 99213 OFFICE O/P EST LOW 20 MIN: CPT | Performed by: STUDENT IN AN ORGANIZED HEALTH CARE EDUCATION/TRAINING PROGRAM

## 2025-02-19 PROCEDURE — 1036F TOBACCO NON-USER: CPT | Performed by: STUDENT IN AN ORGANIZED HEALTH CARE EDUCATION/TRAINING PROGRAM

## 2025-02-19 PROCEDURE — 1159F MED LIST DOCD IN RCRD: CPT | Performed by: STUDENT IN AN ORGANIZED HEALTH CARE EDUCATION/TRAINING PROGRAM

## 2025-02-19 PROCEDURE — 3075F SYST BP GE 130 - 139MM HG: CPT | Performed by: STUDENT IN AN ORGANIZED HEALTH CARE EDUCATION/TRAINING PROGRAM

## 2025-02-19 ASSESSMENT — ENCOUNTER SYMPTOMS
HEADACHES: 0
CONSTIPATION: 0
LIGHT-HEADEDNESS: 0
ABDOMINAL DISTENTION: 0
TROUBLE SWALLOWING: 0
WHEEZING: 0
CHILLS: 0
CONFUSION: 0
NAUSEA: 0
SHORTNESS OF BREATH: 0
DIFFICULTY URINATING: 0
ABDOMINAL PAIN: 0
COUGH: 1
COLOR CHANGE: 0
ARTHRALGIAS: 0
DIARRHEA: 0
UNEXPECTED WEIGHT CHANGE: 0
FEVER: 0
JOINT SWELLING: 0
SPEECH DIFFICULTY: 0
SLEEP DISTURBANCE: 0
BLOOD IN STOOL: 0
VOMITING: 0
DIZZINESS: 0

## 2025-02-19 NOTE — PROGRESS NOTES
Department of Medicine  Division of Pulmonary, Critical Care, and Sleep Medicine  Follow-up  [facility]    85 y.o. male who presents today for a follow-up visit for Asthma.    Their last office visit with me was in 1/15/2025.    I have independently interviewed and examined the patient in the office and reviewed available records. The finalized note is available in the electronic medical record for review by initial referring physician or provider.    Interval History (2/19/2025):  Patient is an 85-year-old  male presenting to the clinic today for follow-up of asthma.  At the last visit we had the patient change his maintenance inhaler from Symbicort to Trelegy and with this change the patient has noticed a dramatic improvement in his overall breathing.  However, the patient has noticed that he continues to have frequent cough.  He states that his cough is worst in the morning and in the evening before attempting to go to sleep.  He will typically take a Robitussin DM in the evening to suppress his cough and the intervention appears to be helping as he is able to get a night sleep.  During the day, he typically has a dry cough 1-2 times per hour.  He does note that the frequency of his coughing increases when he is doing choir practice.  He denies any significant dyspnea on exertion at this point.  He has noticed only a mild improvement in his postnasal drip symptoms with initiation of a more aggressive sinus regimen.      Immunization History:  Immunization History   Administered Date(s) Administered    COVID-19, mRNA, LNP-S, PF, 30 mcg/0.3 mL dose 01/26/2021, 02/16/2021, 09/27/2021    Flu vaccine (IIV4), preservative free *Check age/dose* 12/10/2013    Flu vaccine, quadrivalent, high-dose, preservative free, age 65y+ (FLUZONE) 10/10/2023    Flu vaccine, trivalent, preservative free, HIGH-DOSE, age 65y+ (Fluzone) 10/25/2024    Flu vaccine, trivalent, preservative free, age 6 months and  greater (Fluarix/Fluzone/Flulaval) 11/17/2012, 10/18/2014    Influenza, Unspecified 12/01/2011, 11/16/2015, 02/20/2016, 12/20/2016, 10/12/2017, 10/12/2018, 11/11/2019, 10/08/2020, 10/23/2020, 09/27/2021, 09/28/2022    Influenza, seasonal, injectable 11/01/2022, 10/16/2023    Moderna COVID-19 vaccine, 12 years and older (50mcg/0.5mL)(Spikevax) 10/25/2024    Pfizer COVID-19 vaccine, 12 years and older, (30mcg/0.3mL) (Comirnaty) 11/18/2023    Pfizer Gray Cap SARS-CoV-2 05/13/2022    Pfizer Purple Cap SARS-CoV-2 05/01/2022    Pneumococcal conjugate vaccine, 13-valent (PREVNAR 13) 01/15/2016    Pneumococcal polysaccharide vaccine, 23-valent, age 2 years and older (PNEUMOVAX 23) 08/14/2008, 04/12/2021, 10/01/2022    RSV, 60 Years And Older (AREXVY) 10/23/2023    Td vaccine, age 7 years and older (TENIVAC) 06/15/2010    Tdap vaccine, age 7 year and older (BOOSTRIX, ADACEL) 04/21/2022    Zoster vaccine, recombinant, adult (SHINGRIX) 07/09/2022, 09/28/2022    Zoster, live 02/21/2013       Family History:  Family History   Problem Relation Name Age of Onset    Diabetes Mother      Cancer Mother      Breast cancer Mother      Diabetes Father      Cancer Father      Prostate cancer Father      Cancer Sister      Breast cancer Sister      Other (oral cancer) Brother      Cancer Daughter      Breast cancer Daughter      Other (breast cancer in female) Other         Social History:  Social History     Socioeconomic History    Marital status:    Tobacco Use    Smoking status: Never    Smokeless tobacco: Never   Vaping Use    Vaping status: Never Used   Substance and Sexual Activity    Alcohol use: Yes    Drug use: Never    Sexual activity: Defer       Current Medications:  Current Outpatient Medications   Medication Instructions    albuterol 90 mcg/actuation inhaler 1 puff, inhalation, Every 4 hours PRN    albuterol 2.5 mg, nebulization, Every 6 hours PRN    aspirin 81 mg EC tablet     atorvastatin (LIPITOR) 80 mg, oral,  Daily    calcium 500 mg calcium (1,250 mg) tablet 1 tablet, Daily    cholecalciferol (Vitamin D3) 5,000 Units tablet Daily    dutasteride (AVODART) 0.5 mg, Daily    Eliquis 5 mg tablet TAKE ONE TABLET BY MOUTH TWO TIMES A DAY    famotidine (PEPCID) 40 mg, oral, 2 times daily    fluticasone-umeclidin-vilanter (Trelegy Ellipta) 200-62.5-25 mcg blister with device 1 puff, inhalation, Daily, RINSE MOUTH AFTER EACH USE TO AVOID ORAL THRUSH.    ipratropium-albuteroL (Duo-Neb) 0.5-2.5 mg/3 mL nebulizer solution 3 mL, nebulization, 4 times daily PRN    loratadine (CLARITIN) 10 mg, Daily    Mucinex 1,200 mg, Every 12 hours PRN    omega 3-dha-epa-fish oil (Fish OiL) 1,200 (144-216) mg capsule Take by mouth.    pantoprazole (PROTONIX) 40 mg, oral, Daily, Do not crush, chew, or split    propranolol (INDERAL) 20 mg, oral, 2 times daily    sotalol (BETAPACE) 80 mg, oral, Every 12 hours    terazosin (HYTRIN) 5 mg, oral, Nightly    terazosin (HYTRIN) 10 mg, oral, Nightly    ursodiol (ACTIGALL) 300 mg, oral, 2 times daily    valsartan (DIOVAN) 80 mg, oral, Daily, for blood pressure    zinc gluconate 50 mg tablet Take by mouth.        Drug Allergies/Intolerances:  Allergies   Allergen Reactions    Montelukast Other    Fenofibrate Unknown    Finasteride Itching    Sulfamethizole Itching    Iodinated Contrast Media Nausea/vomiting    Sulfamethoxazole-Trimethoprim Rash and Other     FACIAL REDNESS AND SWEATING        Review of Systems:  Review of Systems   Constitutional:  Negative for chills, fever and unexpected weight change.   HENT:  Positive for postnasal drip. Negative for congestion and trouble swallowing.    Respiratory:  Positive for cough. Negative for shortness of breath and wheezing.    Cardiovascular:  Negative for chest pain.   Gastrointestinal:  Negative for abdominal distention, abdominal pain, blood in stool, constipation, diarrhea, nausea and vomiting.   Genitourinary:  Negative for difficulty urinating.  "  Musculoskeletal:  Negative for arthralgias and joint swelling.   Skin:  Negative for color change.   Neurological:  Negative for dizziness, speech difficulty, light-headedness and headaches.   Psychiatric/Behavioral:  Negative for confusion and sleep disturbance.         All other review of systems are negative and/or non-contributory.    Physical Examination:  /77   Pulse 55   Temp 36.5 °C (97.7 °F)   Resp 16   Ht 1.854 m (6' 1\")   Wt 109 kg (241 lb)   SpO2 97%   BMI 31.80 kg/m²      Measured SpO2 is with ambient air at rest    Physical Exam  Constitutional:       General: He is awake.      Appearance: Normal appearance.   HENT:      Head: Normocephalic and atraumatic.      Nose: Nose normal.      Mouth/Throat:      Mouth: Mucous membranes are moist.   Eyes:      Pupils: Pupils are equal, round, and reactive to light.   Neck:      Thyroid: No thyroid mass.      Trachea: Phonation normal.   Cardiovascular:      Rate and Rhythm: Normal rate and regular rhythm.   Pulmonary:      Effort: Pulmonary effort is normal. No respiratory distress.      Breath sounds: Normal air entry. No decreased breath sounds, wheezing, rhonchi or rales.   Abdominal:      General: Bowel sounds are normal. There is no distension.      Palpations: Abdomen is soft.      Tenderness: There is no abdominal tenderness.   Musculoskeletal:      Cervical back: Neck supple.      Right lower leg: No edema.      Left lower leg: No edema.   Skin:     General: Skin is warm.      Capillary Refill: Capillary refill takes less than 2 seconds.   Neurological:      General: No focal deficit present.      Mental Status: He is alert and oriented to person, place, and time. Mental status is at baseline.      Cranial Nerves: Cranial nerves 2-12 are intact.      Motor: Motor function is intact.   Psychiatric:         Attention and Perception: Attention and perception normal.         Mood and Affect: Mood normal.         Speech: Speech normal.         " Behavior: Behavior normal.         Pulmonary Function Test Results     Failed to redirect to the Timeline version of the [employer] SmartLink.      Chest Radiograph     XR chest 1 view 12/04/2024    Narrative  * * *Final Report* * *    DATE OF EXAM: Dec  4 2024 10:15AM    LFX   [id number]  -  XR CHEST 1V FRONTAL PORT  / ACCESSION #  [id number]    PROCEDURE REASON: Shortness of breath    * * * * Physician Interpretation * * * *    EXAMINATION:  CHEST RADIOGRAPH (PORTABLE SINGLE VIEW AP)    Exam Date/Time:  12/4/2024 10:15 AM  CLINICAL HISTORY: Shortness of breath  MQ:  XCPR_5  Comparison:  Chest x-ray on 11/09/2020    RESULT:    Lines, tubes, and devices:  There appears be an implantable loop recorder  in place.    Lungs and pleura:  No consolidative opacities or mass lesion identified.  There is a stable focal calcification in the right upper lung.  No large  pleural effusions or pneumothorax.    Cardiomediastinal silhouette:  The cardiac silhouette is within normal  limits.  There is tortuosity of the thoracic aorta.    Other:  None.    Impression  IMPRESSION:    No acute findings.                    : LUCIB  Transcribe Date/Time: Dec  4 2024 10:26A    Dictated by : CONCEPCIÓN ARANDA MD    This examination was interpreted and the report reviewed and  electronically signed by:  CONCEPCIÓN ARANDA MD on Dec  4 2024 10:28AM  EST      Echocardiogram     No results found for this or any previous visit from the past 365 days.       Chest CT Scan     No results found for this or any previous visit from the past 365 days.       Relevant Laboratory Tests       Assessment and Plan / Recommendations:  Problem List Items Addressed This Visit          ENT    Nasal polyps  Continue current sinus regimen of Nasacort, Claritin and sinus rinses       Pulmonary and Pneumonias    Asthma - Primary  Specifically moderate persistent asthma without exacerbation  Patient is on appropriate bronchodilator therapy at this point and from a dyspnea  perspective the patient's symptoms are well-controlled  However the patient continues to have frequent cough.  Previous attempts to identify a specific source for his elevated eosinophil count has been unsuccessful (respiratory allergen panel x 2).  I told the patient that with more aggressive cough suppression we might be able to get his cough to stop completely but will take consistent dosing of the medication 2-3 times per day for the next 2 weeks to allow his airways an opportunity to fully heal.  With that said, if the patient has a partially controlled postnasal drip, his cough will never go away completely.  The next step in evaluation if we cannot achieve full control would be to have the patient see an allergist and determine his candidacy for Biologics.  Given the history of the nasal polyposis and multiple documented elevated eosinophil counts, Dupixent might be an ideal choice.     Other Visit Diagnoses       Environmental and seasonal allergies      As above     Follow-up: 3 month    Ricky Reina MD, MSBS   Pulmonary/Critical Care  2:11 PM  02/19/25  [address] [address]  Office: [phone]  Fax: [phone]  Cell: [phone]

## 2025-02-20 ENCOUNTER — APPOINTMENT (OUTPATIENT)
Dept: PULMONOLOGY | Facility: CLINIC | Age: 85
End: 2025-02-20
Payer: MEDICARE

## 2025-02-21 ENCOUNTER — HOSPITAL ENCOUNTER (OUTPATIENT)
Dept: CARDIOLOGY | Facility: HOSPITAL | Age: 85
Discharge: HOME | End: 2025-02-21
Payer: MEDICARE

## 2025-02-21 DIAGNOSIS — Z95.818 IMPLANTABLE LOOP RECORDER PRESENT: ICD-10-CM

## 2025-02-27 DIAGNOSIS — I27.20 PULMONARY HYPERTENSION (MULTI): ICD-10-CM

## 2025-02-28 RX ORDER — VALSARTAN 80 MG/1
80 TABLET ORAL DAILY
Qty: 90 TABLET | Refills: 1 | Status: SHIPPED | OUTPATIENT
Start: 2025-02-28

## 2025-03-14 ENCOUNTER — HOSPITAL ENCOUNTER (OUTPATIENT)
Dept: CARDIOLOGY | Facility: HOSPITAL | Age: 85
Discharge: HOME | End: 2025-03-14
Payer: MEDICARE

## 2025-03-14 PROCEDURE — 93298 REM INTERROG DEV EVAL SCRMS: CPT

## 2025-03-22 DIAGNOSIS — I49.9 CARDIAC ARRHYTHMIA, UNSPECIFIED CARDIAC ARRHYTHMIA TYPE: ICD-10-CM

## 2025-03-24 RX ORDER — APIXABAN 5 MG/1
TABLET, FILM COATED ORAL
Qty: 180 TABLET | Refills: 1 | Status: SHIPPED | OUTPATIENT
Start: 2025-03-24

## 2025-04-03 ENCOUNTER — TELEPHONE (OUTPATIENT)
Dept: CARDIOLOGY | Facility: CLINIC | Age: 85
End: 2025-04-03
Payer: MEDICARE

## 2025-04-03 NOTE — TELEPHONE ENCOUNTER
Pt called to inform Dr. Barraza that his sister just passed due to an abdominal aortic aneurysm. Pt also stated that aortic aneurysms seem to lie in the family history. He said that some of his uncles on mother's side have had aortic aneurysms.    Pt is concerned about his health regarding this condition, and would like to know if Dr. Barraza would like him to get testing done to check.    Please advise.

## 2025-04-03 NOTE — TELEPHONE ENCOUNTER
"Pt was informed that Dr. Barraza said \"Please let the patient know that he had a CT scan of the abdomen in August 2023 and there was no evidence of aneurysm at that time.  He does not need additional testing.\"    " Vera Tran  : 1956  Primary: Medicare Part A And B (Medicare)  Secondary: [employer] MEDICARE SUPP [facility] [place] [address] [place] [address]  Phone: [phone]  Fax: [phone] 

## 2025-04-18 ENCOUNTER — HOSPITAL ENCOUNTER (OUTPATIENT)
Dept: CARDIOLOGY | Facility: HOSPITAL | Age: 85
Discharge: HOME | End: 2025-04-18
Payer: MEDICARE

## 2025-04-18 DIAGNOSIS — Z95.818 IMPLANTABLE LOOP RECORDER PRESENT: ICD-10-CM

## 2025-04-18 PROCEDURE — 93298 REM INTERROG DEV EVAL SCRMS: CPT

## 2025-04-28 DIAGNOSIS — K21.9 GASTROESOPHAGEAL REFLUX DISEASE WITHOUT ESOPHAGITIS: ICD-10-CM

## 2025-04-28 RX ORDER — FAMOTIDINE 40 MG/1
40 TABLET, FILM COATED ORAL 2 TIMES DAILY
Qty: 180 TABLET | Refills: 1 | Status: SHIPPED | OUTPATIENT
Start: 2025-04-28

## 2025-05-05 ENCOUNTER — APPOINTMENT (OUTPATIENT)
Dept: OTOLARYNGOLOGY | Facility: CLINIC | Age: 85
End: 2025-05-05
Payer: MEDICARE

## 2025-05-05 DIAGNOSIS — J30.89 NON-SEASONAL ALLERGIC RHINITIS, UNSPECIFIED TRIGGER: ICD-10-CM

## 2025-05-05 DIAGNOSIS — J01.90 ACUTE NON-RECURRENT SINUSITIS, UNSPECIFIED LOCATION: Primary | ICD-10-CM

## 2025-05-05 PROCEDURE — 1123F ACP DISCUSS/DSCN MKR DOCD: CPT | Performed by: OTOLARYNGOLOGY

## 2025-05-05 PROCEDURE — 99213 OFFICE O/P EST LOW 20 MIN: CPT | Performed by: OTOLARYNGOLOGY

## 2025-05-05 PROCEDURE — 1159F MED LIST DOCD IN RCRD: CPT | Performed by: OTOLARYNGOLOGY

## 2025-05-05 PROCEDURE — 1160F RVW MEDS BY RX/DR IN RCRD: CPT | Performed by: OTOLARYNGOLOGY

## 2025-05-05 RX ORDER — DOXYCYCLINE 100 MG/1
100 CAPSULE ORAL 2 TIMES DAILY
Qty: 28 CAPSULE | Refills: 0 | Status: SHIPPED | OUTPATIENT
Start: 2025-05-05 | End: 2025-05-19

## 2025-05-05 RX ORDER — PREDNISONE 20 MG/1
TABLET ORAL
Qty: 9 TABLET | Refills: 0 | Status: SHIPPED | OUTPATIENT
Start: 2025-05-05 | End: 2025-05-12

## 2025-05-05 NOTE — PROGRESS NOTES
Break coverage RN: Received report from GINO Knox. Pt is A&ox4, breathing even and unlabored. NSR on the cardiac monitor. Denies pain or discomfort at this time. Pt currently at CT. Lab notification by Nayely, pt lactate level 2.1. MD Kowalski made aware. Subjective   Patient ID: Juan Carlos Nguyen is a 85 y.o. male who presents for No chief complaint on file.    HPI  Patient returns, being seen for nasal congestion. Has been experiencing nasal congestion, drainage, productive cough for several months. Drainage is green, thick. Also with bilateral ear congestion. All remaining head neck inquiry otherwise negative.     Review of Systems   Constitutional: Negative.    HENT: Negative.     Respiratory: Negative.     Cardiovascular: Negative.    Neurological: Negative.      Physical Exam:  General appearance: No acute distress. Normal facies. Symmetric facial movement. No gross lesions of the face are noted.  Ears:  The external ear structures appear normal. The ear canals patent and the tympanic membranes are intact without evidence of air-fluid levels, retraction, or congenital defects.    Nose:  Anterior rhinoscopy notes essentially a midline nasal septum. Examination is noted for normal healthy mucosal membranes without any evidence of lesions, polyps, or exudate.   Throat/Oral mucosa:  The tongue is normally mobile. There are no lesions on the gingiva, buccal, or oral mucosa. There are no oral cavity masses.  Neck:  The neck is negative for mass lymphadenopathy. The trachea and parotid are clear. The thyroid bed is grossly unremarkable. The salivary gland structures are grossly unremarkable.    Assessment/Plan   Acute sinusitis. Will prescribe course of Prednisone and Doxycycline and see patient for follow-up in several weeks.   Allergies. Will refer patient to Allergy for further allergy workup. Has had bloodwork in past as ordered by Pulm.    PT received from break RN. PT A/O x 3. Pt is resting in stretcher, easily arousable to verbal stimuli, no apparent distress noted. Respirations even and unlabored. Vital signs reassessed as noted. Pt denies chest pain at this time. Awaiting results.

## 2025-05-08 ENCOUNTER — APPOINTMENT (OUTPATIENT)
Dept: PRIMARY CARE | Facility: CLINIC | Age: 85
End: 2025-05-08
Payer: MEDICARE

## 2025-05-08 VITALS
HEART RATE: 55 BPM | WEIGHT: 241 LBS | HEIGHT: 73 IN | DIASTOLIC BLOOD PRESSURE: 78 MMHG | BODY MASS INDEX: 31.94 KG/M2 | TEMPERATURE: 96.7 F | SYSTOLIC BLOOD PRESSURE: 118 MMHG | RESPIRATION RATE: 16 BRPM

## 2025-05-08 DIAGNOSIS — Z12.5 SCREENING FOR PROSTATE CANCER: ICD-10-CM

## 2025-05-08 DIAGNOSIS — N40.0 BENIGN PROSTATIC HYPERPLASIA WITHOUT LOWER URINARY TRACT SYMPTOMS: ICD-10-CM

## 2025-05-08 DIAGNOSIS — I27.20 PULMONARY HYPERTENSION, UNSPECIFIED (MULTI): ICD-10-CM

## 2025-05-08 DIAGNOSIS — I48.0 PAROXYSMAL ATRIAL FIBRILLATION (MULTI): ICD-10-CM

## 2025-05-08 DIAGNOSIS — Z00.00 ROUTINE GENERAL MEDICAL EXAMINATION AT HEALTH CARE FACILITY: Primary | ICD-10-CM

## 2025-05-08 DIAGNOSIS — I10 ESSENTIAL HYPERTENSION, BENIGN: ICD-10-CM

## 2025-05-08 DIAGNOSIS — E78.2 MIXED HYPERLIPIDEMIA: ICD-10-CM

## 2025-05-08 PROBLEM — G83.24: Status: RESOLVED | Noted: 2024-09-05 | Resolved: 2025-05-08

## 2025-05-08 PROCEDURE — 1158F ADVNC CARE PLAN TLK DOCD: CPT | Performed by: FAMILY MEDICINE

## 2025-05-08 PROCEDURE — 1036F TOBACCO NON-USER: CPT | Performed by: FAMILY MEDICINE

## 2025-05-08 PROCEDURE — 1159F MED LIST DOCD IN RCRD: CPT | Performed by: FAMILY MEDICINE

## 2025-05-08 PROCEDURE — 1170F FXNL STATUS ASSESSED: CPT | Performed by: FAMILY MEDICINE

## 2025-05-08 PROCEDURE — 3078F DIAST BP <80 MM HG: CPT | Performed by: FAMILY MEDICINE

## 2025-05-08 PROCEDURE — 99397 PER PM REEVAL EST PAT 65+ YR: CPT | Performed by: FAMILY MEDICINE

## 2025-05-08 PROCEDURE — 99212 OFFICE O/P EST SF 10 MIN: CPT | Performed by: FAMILY MEDICINE

## 2025-05-08 PROCEDURE — 3074F SYST BP LT 130 MM HG: CPT | Performed by: FAMILY MEDICINE

## 2025-05-08 PROCEDURE — 1160F RVW MEDS BY RX/DR IN RCRD: CPT | Performed by: FAMILY MEDICINE

## 2025-05-08 PROCEDURE — G0439 PPPS, SUBSEQ VISIT: HCPCS | Performed by: FAMILY MEDICINE

## 2025-05-08 RX ORDER — TERAZOSIN 10 MG/1
10 CAPSULE ORAL NIGHTLY
Qty: 90 CAPSULE | Refills: 1 | Status: SHIPPED | OUTPATIENT
Start: 2025-05-08

## 2025-05-08 ASSESSMENT — ACTIVITIES OF DAILY LIVING (ADL)
GROCERY_SHOPPING: INDEPENDENT
MANAGING_FINANCES: INDEPENDENT
DOING_HOUSEWORK: INDEPENDENT
DRESSING: INDEPENDENT
TAKING_MEDICATION: INDEPENDENT
BATHING: INDEPENDENT

## 2025-05-08 NOTE — ASSESSMENT & PLAN NOTE
Appropriate labs ordered or reviewed.  Patient reports no refills are needed today.  Make a follow up appointment with me for recheck in 6 months.  Orders:    Comprehensive Metabolic Panel; Future    CBC and Auto Differential; Future

## 2025-05-08 NOTE — PROGRESS NOTES
History Of Present Illness  Juan Carlos Nguyen is a 85 y.o. male presenting for a Medicare Annual Wellness Exam.  Patient is here for a periodic health exam.  I reviewed previous preventative health measures including screening tests, immunizations and labs.  I reviewed screenings administered by my staff today.       PREVIOUS PREVENTATIVE HEALTH    Colonoscopy : Yes    Date: 3/30/2023  Cologuard : No  Prostate cancer screening with PSA : Yes 5/23/2024  Prevnar : Yes 1/15/2016  Shingrix : Yes    Date: 7/9/2022 and 9/28/2022  Tdap within 10 years : Yes 4/21/2022  Yearly Flu Shot : Yes      CURRENT FINDINGS    Falls : No  Problems with ADL's :  No  Healthy Diet : Yes  Exercise :  Yes - swims 3 days per week.  And bike soon.    Vision or Hearing Problems : No  Depression Issues : No  Alcohol Use : Yes.  One glass of wine per day.    Tobacco Use : No    HTN recheck -- Patient denies chest pain, SOB, edema, palpitations on review.  Taking medication correctly and denies any side effects.    BPH Recheck -- Patient reports BPH symptoms are well controlled with current medications.  No change/worsening in frequency, nocturia.  Urine stream is good with little or no hesitancy.  No SE's from medications.  Dad and brother with prostate cancer.      GERD recheck -- Patient reports GI symptoms are well controlled with current treatment.  No heartburn, chest pain, vomiting, diarrhea.  No SE's from current treatment.  Patient wishes to continue the same treatment.     Recheck atrial fibrillation --this is monitored by his electrophysiologist and has been stable.    On atbcs and prednisone for infection and asthma flare.       Past Medical History  Problem List[1]    Surgical History[2]   Current Medications[3]   Immunization History   Administered Date(s) Administered    COVID-19, mRNA, LNP-S, PF, 30 mcg/0.3 mL dose 01/26/2021, 02/16/2021, 09/27/2021    Flu vaccine (IIV4), preservative free *Check age/dose* 12/10/2013    Flu  vaccine, quadrivalent, high-dose, preservative free, age 65y+ (FLUZONE) 10/10/2023    Flu vaccine, trivalent, preservative free, HIGH-DOSE, age 65y+ (Fluzone) 10/25/2024    Flu vaccine, trivalent, preservative free, age 6 months and greater (Fluarix/Fluzone/Flulaval) 11/17/2012, 10/18/2014    Influenza, Unspecified 12/01/2011, 11/16/2015, 02/20/2016, 12/20/2016, 10/12/2017, 10/12/2018, 11/11/2019, 10/08/2020, 10/23/2020, 09/27/2021, 09/28/2022    Influenza, seasonal, injectable 11/01/2022, 10/16/2023    Moderna COVID-19 vaccine, 12 years and older (50mcg/0.5mL)(Spikevax) 10/25/2024    Pfizer COVID-19 vaccine, 12 years and older, (30mcg/0.3mL) (Comirnaty) 11/18/2023    Pfizer Gray Cap SARS-CoV-2 05/13/2022    Pfizer Purple Cap SARS-CoV-2 05/01/2022    Pneumococcal conjugate vaccine, 13-valent (PREVNAR 13) 01/15/2016    Pneumococcal polysaccharide vaccine, 23-valent, age 2 years and older (PNEUMOVAX 23) 08/14/2008, 04/12/2021, 10/01/2022    RSV, 60 Years And Older (AREXVY) 10/23/2023    Td vaccine, age 7 years and older (TENIVAC) 06/15/2010    Tdap vaccine, age 7 year and older (BOOSTRIX, ADACEL) 04/21/2022    Zoster vaccine, recombinant, adult (SHINGRIX) 07/09/2022, 09/28/2022    Zoster, live 02/21/2013        Social History  Social History     Socioeconomic History    Marital status:      Spouse name: Not on file    Number of children: Not on file    Years of education: Not on file    Highest education level: Not on file   Occupational History    Not on file   Tobacco Use    Smoking status: Never    Smokeless tobacco: Never   Vaping Use    Vaping status: Never Used   Substance and Sexual Activity    Alcohol use: Yes    Drug use: Never    Sexual activity: Defer   Other Topics Concern    Not on file   Social History Narrative    Not on file     Social Drivers of Health     Financial Resource Strain: Not on file   Food Insecurity: Not on file   Transportation Needs: Not on file   Physical Activity: Not on file  "  Stress: Not on file   Social Connections: Not on file   Intimate Partner Violence: Not on file   Housing Stability: Not on file        reports current alcohol use.    reports that he has never smoked. He has never used smokeless tobacco.    reports no history of drug use.           Allergies  Montelukast, Fenofibrate, Finasteride, Sulfamethizole, Iodinated contrast media, and Sulfamethoxazole-trimethoprim      Physical Exam  Visit Vitals  /78   Pulse 55   Temp 35.9 °C (96.7 °F)   Resp 16   Ht 1.854 m (6' 1\")   Wt 109 kg (241 lb)   BMI 31.80 kg/m²   Smoking Status Never   BSA 2.37 m²     Body mass index is 31.8 kg/m².    Physical Exam  Vitals and nursing note reviewed.   Constitutional:       General: He is not in acute distress.     Appearance: Normal appearance.   HENT:      Head: Normocephalic and atraumatic.      Right Ear: Tympanic membrane, ear canal and external ear normal.      Left Ear: Tympanic membrane, ear canal and external ear normal.      Nose: Nose normal.      Mouth/Throat:      Mouth: Mucous membranes are moist.      Pharynx: Oropharynx is clear.   Eyes:      Extraocular Movements: Extraocular movements intact.      Conjunctiva/sclera: Conjunctivae normal.      Pupils: Pupils are equal, round, and reactive to light.   Cardiovascular:      Rate and Rhythm: Normal rate and regular rhythm.      Pulses: Normal pulses.      Heart sounds: Normal heart sounds. No murmur heard.     No friction rub. No gallop.   Pulmonary:      Effort: Pulmonary effort is normal. No respiratory distress.      Breath sounds: Normal breath sounds.   Abdominal:      General: Abdomen is flat. Bowel sounds are normal. There is no distension.      Palpations: Abdomen is soft.      Tenderness: There is no abdominal tenderness.   Musculoskeletal:         General: Normal range of motion.      Cervical back: Normal range of motion and neck supple.   Lymphadenopathy:      Cervical: No cervical adenopathy.   Skin:     General: " Skin is warm and dry.      Findings: No lesion or rash.   Neurological:      General: No focal deficit present.      Mental Status: He is alert. Mental status is at baseline.   Psychiatric:         Mood and Affect: Mood normal.         Behavior: Behavior normal.         Thought Content: Thought content normal.         Judgment: Judgment normal.                 Assessment      Assessment & Plan  Routine general medical examination at health care facility  I ordered a PSA.  His other labs and immunizations and screenings are up-to-date.  Orders:    1 Year Follow Up In [facility] - PCP - Wellness Exam    1 Year Follow Up In [facility] - PCP - Wellness Exam; Future    Mixed hyperlipidemia  Appropriate labs ordered or reviewed.  Patient reports no refills are needed today.  Make a follow up appointment with me for recheck in 6 months.  Orders:    Comprehensive Metabolic Panel; Future    CBC and Auto Differential; Future    Benign prostatic hyperplasia without lower urinary tract symptoms  Condition well controlled.  No change in current treatment regimen.  Refill given of current medication.  Make a follow up appointment with me for recheck in 6 months.  Orders:    terazosin (Hytrin) 10 mg capsule; Take 1 capsule (10 mg) by mouth once daily at bedtime.    Essential hypertension, benign  Appropriate labs ordered or reviewed.  Patient reports no refills are needed today.  Orders:    Comprehensive Metabolic Panel; Future    CBC and Auto Differential; Future    Screening for prostate cancer    Orders:    PSA screen; Future    Pulmonary hypertension, unspecified (Multi)  This is stable and managed by his cardiologist.       Paroxysmal atrial fibrillation (Multi)  This is stable and managed by his cardiologist.            I recommend regular exercise, balanced diet, regular dental exams, and healthy habits.  Patient denies depression sxs.  Patient is able to perform all ADL's without assistance.  I  reminded patient to get yearly eye exams with glaucoma screening.  I recommend to eat plenty of plant foods (such as whole-grain products, fruits, and vegetables) and a moderate amount of lean and low-fat, animal-based food (meat and dairy products).  When shopping, choose lean meats, fish, and poultry. I recommend to try to get regular aerobic exercise.  I recommend a yearly flu shot in the fall and I recommend a yearly wellness exam.      Orders Placed This Encounter      terazosin (Hytrin) 10 mg capsule       Orders Placed This Encounter   Procedures    Comprehensive Metabolic Panel    CBC and Auto Differential    PSA screen        New Medications Ordered This Visit   Medications    terazosin (Hytrin) 10 mg capsule     Sig: Take 1 capsule (10 mg) by mouth once daily at bedtime.     Dispense:  90 capsule     Refill:  1                   Mj Moreira MD         [1]   Patient Active Problem List  Diagnosis    Actinic keratosis    Acute non-recurrent sinusitis    Allergic rhinitis    Anxiety    Ventricular ectopy    Asthma    Basal cell carcinoma    Essential hypertension, benign    BPH (benign prostatic hyperplasia)    Choledocholithiasis    Chronic obstructive pulmonary disease (Multi)    Dysuria-frequency syndrome    Essential tremor    Mastodynia    Lung nodules    Lump or mass in breast    Mixed hyperlipidemia    History of ST elevation myocardial infarction (STEMI)    Gross hematuria    Gastroesophageal reflux disease without esophagitis    Frequent PVCs    MGD (meibomian gland dysfunction)    Mild CAD    Nuclear sclerosis of both eyes    Obesity (BMI 30.0-34.9)    Obstructive sleep apnea    Osteoarthritis of knee    PAC (premature atrial contraction)    Pulmonary hypertension (Multi)    PVD (posterior vitreous detachment), both eyes    Recurrent nephrolithiasis    Retained gallstones following laparoscopic cholecystectomy    Seborrheic keratosis, inflamed    Sinus bradycardia    Intention tremor     Trichiasis of right eyelid    Venous lake of lip    COPD (chronic obstructive pulmonary disease) (Multi)    Bilateral presbyopia    Bilateral sensorineural hearing loss    Nasal polyps    Stroke-like symptoms    Implantable loop recorder present    Localized, primary osteoarthritis    TIA (transient ischemic attack)    Anticoagulant long-term use    Chronic sinusitis    ABE (obstructive sleep apnea)    BMI 33.0-33.9,adult    Nonsmoker    Class 1 obesity due to excess calories with serious comorbidity and body mass index (BMI) of 34.0 to 34.9 in adult    Anisometropia    BMI 31.0-31.9,adult    Encounter for medication review and counseling    Encounter to discuss treatment options    Abnormal genitourinary radiography    Angiomyolipoma of kidney    Benign neoplasm of urinary organ    Bladder diverticulum    Incomplete emptying of bladder    Nocturia    Renal mass    Slowing of urinary stream    Unspecified fall, initial encounter    Urge incontinence    Urinary urgency    Weak urinary stream   [2]   Past Surgical History:  Procedure Laterality Date    CHOLECYSTECTOMY  10/14/2014    Cholecystectomy    MR HEAD ANGIO WO IV CONTRAST  8/14/2023    MR HEAD ANGIO WO IV CONTRAST 8/14/2023 [facility]    MR NECK ANGIO WO IV CONTRAST  8/14/2023    MR NECK ANGIO WO IV CONTRAST 8/14/2023 [facility]    OTHER SURGICAL HISTORY  04/30/2014    Left Breast Biopsy During Breast Surgery    OTHER SURGICAL HISTORY  04/11/2022    Loop recorder insertion    OTHER SURGICAL HISTORY  10/11/2021    Loop recorder insertion    OTHER SURGICAL HISTORY  10/11/2021    Tonsillectomy    OTHER SURGICAL HISTORY  10/11/2021    Cardioversion    OTHER SURGICAL HISTORY  06/07/2017    Anesthesia For Cystoscopy    OTHER SURGICAL HISTORY  04/11/2022    Colonoscopy    OTHER SURGICAL HISTORY  04/11/2022    Endoscopy    TONSILLECTOMY  06/07/2017    Tonsillectomy   [3]   Current Outpatient Medications:     albuterol 2.5 mg /3 mL (0.083 %) nebulizer solution, Take 3 mL  (2.5 mg) by nebulization every 6 hours if needed for wheezing., Disp: 75 mL, Rfl: 3    albuterol 90 mcg/actuation inhaler, Inhale 1 puff every 4 hours if needed for wheezing., Disp: 18 g, Rfl: 6    apixaban (Eliquis) 5 mg tablet, TAKE ONE TABLET BY MOUTH TWO TIMES A DAY, Disp: 180 tablet, Rfl: 1    aspirin 81 mg EC tablet, , Disp: , Rfl:     atorvastatin (Lipitor) 80 mg tablet, TAKE ONE TABLET BY MOUTH EVERY DAY, Disp: 90 tablet, Rfl: 1    calcium 500 mg calcium (1,250 mg) tablet, Take 1 tablet (1,250 mg) by mouth once daily., Disp: , Rfl:     cholecalciferol (Vitamin D3) 5,000 Units tablet, Take by mouth once daily., Disp: , Rfl:     doxycycline (Vibramycin) 100 mg capsule, Take 1 capsule (100 mg) by mouth 2 times a day for 14 days. Take with at least 8 ounces (large glass) of water, do not lie down for 30 minutes after, Disp: 28 capsule, Rfl: 0    dutasteride (Avodart) 0.5 mg capsule, Take 1 capsule (0.5 mg) by mouth once daily., Disp: , Rfl:     famotidine (Pepcid) 40 mg tablet, TAKE ONE TABLET BY MOUTH TWO TIMES A DAY, Disp: 180 tablet, Rfl: 1    fluticasone-umeclidin-vilanter (Trelegy Ellipta) 200-62.5-25 mcg blister with device, Inhale 1 puff once daily. RINSE MOUTH AFTER EACH USE TO AVOID ORAL THRUSH., Disp: 28 each, Rfl: 3    guaiFENesin (Mucinex) 1,200 mg tablet extended release 12hr, Take 1 tablet (1,200 mg) by mouth every 12 hours if needed., Disp: , Rfl:     ipratropium-albuteroL (Duo-Neb) 0.5-2.5 mg/3 mL nebulizer solution, Take 3 mL by nebulization 4 times a day as needed for wheezing or shortness of breath., Disp: 90 mL, Rfl: 5    loratadine (Claritin) 10 mg tablet, Take 1 tablet (10 mg) by mouth once daily., Disp: , Rfl:     omega 3-dha-epa-fish oil (Fish OiL) 1,200 (144-216) mg capsule, Take by mouth., Disp: , Rfl:     pantoprazole (ProtoNix) 40 mg EC tablet, TAKE ONE TABLET BY MOUTH DAILY. DO NOT CRUSH, CHEW, OR SPLIT, Disp: 90 tablet, Rfl: 0    predniSONE (Deltasone) 20 mg tablet, Take 2 tablets  (40 mg) by mouth once daily for 3 days, THEN 1 tablet (20 mg) once daily for 2 days, THEN 0.5 tablets (10 mg) once daily for 2 days., Disp: 9 tablet, Rfl: 0    propranolol (Inderal) 20 mg tablet, TAKE ONE TABLET BY MOUTH TWO TIMES A DAY, Disp: 180 tablet, Rfl: 1    sotalol (Betapace) 80 mg tablet, TAKE ONE TABLET BY MOUTH EVERY 12 HOURS, Disp: 180 tablet, Rfl: 0    terazosin (Hytrin) 5 mg capsule, Take 1 capsule (5 mg) by mouth once daily at bedtime., Disp: 90 capsule, Rfl: 3    ursodiol (Actigall) 300 mg capsule, TAKE ONE CAPSULE BY MOUTH TWO TIMES A DAY, Disp: 180 capsule, Rfl: 1    valsartan (Diovan) 80 mg tablet, TAKE ONE TABLET BY MOUTH DAILY for blood pressure, Disp: 90 tablet, Rfl: 1    zinc gluconate 50 mg tablet, Take by mouth., Disp: , Rfl:     terazosin (Hytrin) 10 mg capsule, Take 1 capsule (10 mg) by mouth once daily at bedtime., Disp: 90 capsule, Rfl: 1

## 2025-05-08 NOTE — ASSESSMENT & PLAN NOTE
Condition well controlled.  No change in current treatment regimen.  Refill given of current medication.  Make a follow up appointment with me for recheck in 6 months.  Orders:    terazosin (Hytrin) 10 mg capsule; Take 1 capsule (10 mg) by mouth once daily at bedtime.

## 2025-05-08 NOTE — ASSESSMENT & PLAN NOTE
Appropriate labs ordered or reviewed.  Patient reports no refills are needed today.  Orders:    Comprehensive Metabolic Panel; Future    CBC and Auto Differential; Future

## 2025-05-09 ENCOUNTER — TELEPHONE (OUTPATIENT)
Facility: CLINIC | Age: 85
End: 2025-05-09
Payer: MEDICARE

## 2025-05-09 NOTE — TELEPHONE ENCOUNTER
"Patient called.    See you last 12/2024.    CPAP is showing \"motor life exceeded\".    Is asking for a new machine order to go to [facility].    Machine is over 5 years old.    Please review and advise.  "

## 2025-05-12 DIAGNOSIS — I48.0 PAROXYSMAL ATRIAL FIBRILLATION (MULTI): ICD-10-CM

## 2025-05-12 DIAGNOSIS — K21.9 GASTROESOPHAGEAL REFLUX DISEASE WITHOUT ESOPHAGITIS: ICD-10-CM

## 2025-05-12 RX ORDER — PANTOPRAZOLE SODIUM 40 MG/1
40 TABLET, DELAYED RELEASE ORAL DAILY
Qty: 90 TABLET | Refills: 1 | Status: SHIPPED | OUTPATIENT
Start: 2025-05-12

## 2025-05-12 RX ORDER — SOTALOL HYDROCHLORIDE 80 MG/1
80 TABLET ORAL EVERY 12 HOURS
Qty: 180 TABLET | Refills: 1 | Status: SHIPPED | OUTPATIENT
Start: 2025-05-12

## 2025-05-13 ENCOUNTER — DOCUMENTATION (OUTPATIENT)
Facility: CLINIC | Age: 85
End: 2025-05-13
Payer: MEDICARE

## 2025-05-13 DIAGNOSIS — G47.33 OSA (OBSTRUCTIVE SLEEP APNEA): Primary | ICD-10-CM

## 2025-05-13 NOTE — PROGRESS NOTES
"Patient called in, his CPAP is reading \"motor life exceeded\" needs replacement device; requesting through DME- [facility]. Order placed.   Media Information          "

## 2025-05-19 ENCOUNTER — APPOINTMENT (OUTPATIENT)
Dept: PULMONOLOGY | Facility: CLINIC | Age: 85
End: 2025-05-19
Payer: MEDICARE

## 2025-05-23 ENCOUNTER — HOSPITAL ENCOUNTER (OUTPATIENT)
Dept: CARDIOLOGY | Facility: HOSPITAL | Age: 85
Discharge: HOME | End: 2025-05-23
Payer: MEDICARE

## 2025-05-23 ENCOUNTER — APPOINTMENT (OUTPATIENT)
Dept: OTOLARYNGOLOGY | Facility: CLINIC | Age: 85
End: 2025-05-23
Payer: MEDICARE

## 2025-05-23 DIAGNOSIS — R55 SYNCOPE AND COLLAPSE: ICD-10-CM

## 2025-05-23 DIAGNOSIS — J01.90 ACUTE NON-RECURRENT SINUSITIS, UNSPECIFIED LOCATION: Primary | ICD-10-CM

## 2025-05-23 DIAGNOSIS — Z95.818 PRESENCE OF OTHER CARDIAC IMPLANTS AND GRAFTS: ICD-10-CM

## 2025-05-23 PROCEDURE — 93298 REM INTERROG DEV EVAL SCRMS: CPT

## 2025-05-23 PROCEDURE — 99213 OFFICE O/P EST LOW 20 MIN: CPT | Performed by: OTOLARYNGOLOGY

## 2025-05-23 PROCEDURE — 1159F MED LIST DOCD IN RCRD: CPT | Performed by: OTOLARYNGOLOGY

## 2025-05-23 PROCEDURE — 1160F RVW MEDS BY RX/DR IN RCRD: CPT | Performed by: OTOLARYNGOLOGY

## 2025-05-23 PROCEDURE — 93298 REM INTERROG DEV EVAL SCRMS: CPT | Performed by: INTERNAL MEDICINE

## 2025-05-23 NOTE — PROGRESS NOTES
"Subjective   Patient ID: Juan Carlos Nguyen is a 85 y.o. male who presents for 2 WEEK FU ON SINUSES.    HPI  The patient returns, being seen for 2-week follow-up on sinuses. He finished his course of Doxycycline and Prednisone and is feeling and breathing \"100% better\", with no more coughing. He is still getting minimal postnasal drip, but this has lessened significantly. All remaining head neck inquiry otherwise negative.     Physical Exam  General appearance: No acute distress. Normal facies. Symmetric facial movement. No gross lesions of the face are noted.  Ears:  The external ear structures appear normal. The ear canals patent and the tympanic membranes are intact without evidence of air-fluid levels, retraction, or congenital defects.    Nose:  Anterior rhinoscopy notes essentially a midline nasal septum. Examination is noted for normal healthy mucosal membranes without any evidence of lesions, polyps, or exudate.   Throat/Oral mucosa:  The tongue is normally mobile. There are no lesions on the gingiva, buccal, or oral mucosa. There are no oral cavity masses.  Neck:  The neck is negative for mass lymphadenopathy. The trachea and parotid are clear. The thyroid bed is grossly unremarkable. The salivary gland structures are grossly unremarkable.    Assessment/Plan   Doing much better following course of Doxycycline and Prednisone. No concerns today. Recommend follow-up in 6 months, sooner if needed.   "

## 2025-05-28 ENCOUNTER — APPOINTMENT (OUTPATIENT)
Dept: PULMONOLOGY | Facility: CLINIC | Age: 85
End: 2025-05-28
Payer: MEDICARE

## 2025-05-29 DIAGNOSIS — N34.3 DYSURIA-FREQUENCY SYNDROME: ICD-10-CM

## 2025-05-29 RX ORDER — URSODIOL 300 MG/1
300 CAPSULE ORAL 2 TIMES DAILY
Qty: 180 CAPSULE | Refills: 1 | Status: SHIPPED | OUTPATIENT
Start: 2025-05-29

## 2025-06-02 ENCOUNTER — TELEPHONE (OUTPATIENT)
Facility: CLINIC | Age: 85
End: 2025-06-02
Payer: MEDICARE

## 2025-06-04 ENCOUNTER — TELEPHONE (OUTPATIENT)
Dept: PULMONOLOGY | Facility: CLINIC | Age: 85
End: 2025-06-04
Payer: MEDICARE

## 2025-06-04 DIAGNOSIS — J45.51 SEVERE PERSISTENT ASTHMA WITH ACUTE EXACERBATION (MULTI): ICD-10-CM

## 2025-06-04 LAB
ALBUMIN SERPL-MCNC: 3.6 G/DL (ref 3.6–5.1)
ALP SERPL-CCNC: 59 U/L (ref 35–144)
ALT SERPL-CCNC: 11 U/L (ref 9–46)
ANION GAP SERPL CALCULATED.4IONS-SCNC: 8 MMOL/L (CALC) (ref 7–17)
AST SERPL-CCNC: 15 U/L (ref 10–35)
BASOPHILS # BLD AUTO: 20 CELLS/UL (ref 0–200)
BASOPHILS NFR BLD AUTO: 0.5 %
BILIRUB SERPL-MCNC: 0.6 MG/DL (ref 0.2–1.2)
BUN SERPL-MCNC: 18 MG/DL (ref 7–25)
CALCIUM SERPL-MCNC: 8.9 MG/DL (ref 8.6–10.3)
CHLORIDE SERPL-SCNC: 109 MMOL/L (ref 98–110)
CO2 SERPL-SCNC: 29 MMOL/L (ref 20–32)
CREAT SERPL-MCNC: 1.08 MG/DL (ref 0.7–1.22)
EGFRCR SERPLBLD CKD-EPI 2021: 68 ML/MIN/1.73M2
EOSINOPHIL # BLD AUTO: 316 CELLS/UL (ref 15–500)
EOSINOPHIL NFR BLD AUTO: 7.9 %
ERYTHROCYTE [DISTWIDTH] IN BLOOD BY AUTOMATED COUNT: 13.4 % (ref 11–15)
GLUCOSE SERPL-MCNC: 108 MG/DL (ref 65–99)
HCT VFR BLD AUTO: 36.8 % (ref 38.5–50)
HGB BLD-MCNC: 11.6 G/DL (ref 13.2–17.1)
LYMPHOCYTES # BLD AUTO: 832 CELLS/UL (ref 850–3900)
LYMPHOCYTES NFR BLD AUTO: 20.8 %
MCH RBC QN AUTO: 30 PG (ref 27–33)
MCHC RBC AUTO-ENTMCNC: 31.5 G/DL (ref 32–36)
MCV RBC AUTO: 95.1 FL (ref 80–100)
MONOCYTES # BLD AUTO: 504 CELLS/UL (ref 200–950)
MONOCYTES NFR BLD AUTO: 12.6 %
NEUTROPHILS # BLD AUTO: 2328 CELLS/UL (ref 1500–7800)
NEUTROPHILS NFR BLD AUTO: 58.2 %
PLATELET # BLD AUTO: 177 THOUSAND/UL (ref 140–400)
PMV BLD REES-ECKER: 11.8 FL (ref 7.5–12.5)
POTASSIUM SERPL-SCNC: 5.2 MMOL/L (ref 3.5–5.3)
PROT SERPL-MCNC: 5.8 G/DL (ref 6.1–8.1)
PSA SERPL-MCNC: 0.32 NG/ML
RBC # BLD AUTO: 3.87 MILLION/UL (ref 4.2–5.8)
SODIUM SERPL-SCNC: 146 MMOL/L (ref 135–146)
WBC # BLD AUTO: 4 THOUSAND/UL (ref 3.8–10.8)

## 2025-06-04 RX ORDER — FLUTICASONE FUROATE, UMECLIDINIUM BROMIDE AND VILANTEROL TRIFENATATE 200; 62.5; 25 UG/1; UG/1; UG/1
1 POWDER RESPIRATORY (INHALATION) DAILY
Qty: 28 EACH | Refills: 3 | Status: SHIPPED | OUTPATIENT
Start: 2025-06-04

## 2025-06-04 NOTE — TELEPHONE ENCOUNTER
Patient has been without medication for 4 days and is in need of a refill. This is the second or third phone call I received. Please refill prescription for trelegy.

## 2025-06-06 ENCOUNTER — TELEPHONE (OUTPATIENT)
Dept: PRIMARY CARE | Facility: CLINIC | Age: 85
End: 2025-06-06
Payer: MEDICARE

## 2025-06-06 DIAGNOSIS — D64.9 NORMOCYTIC ANEMIA: Primary | ICD-10-CM

## 2025-06-06 NOTE — TELEPHONE ENCOUNTER
----- Message from Mj Moreira sent at 6/6/2025  7:42 AM EDT -----  Please inform the patient that his recent labs show that he has recurrence of mild anemia.  I am not sure why his red blood cell count is low but I would like to check some labs for further   evaluation.  Please get a repeat CBC, ferritin, B12, folate and TSH for further evaluation.  We will call him with results and additional instructions.  Please add normocytic anemia to his diagnosis   list.    The rest of his labs were all essentially normal.  ----- Message -----  From: [facility] Results In  Sent: 6/4/2025   6:05 AM EDT  To: Mj Moreira MD

## 2025-06-07 LAB
ERYTHROCYTE [DISTWIDTH] IN BLOOD BY AUTOMATED COUNT: 12.9 % (ref 11–15)
FERRITIN SERPL-MCNC: 38 NG/ML (ref 24–380)
FOLATE SERPL-MCNC: 15.2 NG/ML
HCT VFR BLD AUTO: 35.6 % (ref 38.5–50)
HGB BLD-MCNC: 11.3 G/DL (ref 13.2–17.1)
MCH RBC QN AUTO: 30.5 PG (ref 27–33)
MCHC RBC AUTO-ENTMCNC: 31.7 G/DL (ref 32–36)
MCV RBC AUTO: 96.2 FL (ref 80–100)
PLATELET # BLD AUTO: 182 THOUSAND/UL (ref 140–400)
PMV BLD REES-ECKER: 11.3 FL (ref 7.5–12.5)
RBC # BLD AUTO: 3.7 MILLION/UL (ref 4.2–5.8)
TSH SERPL-ACNC: 1.31 MIU/L (ref 0.4–4.5)
VIT B12 SERPL-MCNC: 420 PG/ML (ref 200–1100)
WBC # BLD AUTO: 5.4 THOUSAND/UL (ref 3.8–10.8)

## 2025-06-09 ENCOUNTER — OFFICE VISIT (OUTPATIENT)
Dept: PULMONOLOGY | Facility: CLINIC | Age: 85
End: 2025-06-09
Payer: MEDICARE

## 2025-06-09 VITALS
OXYGEN SATURATION: 96 % | HEART RATE: 55 BPM | WEIGHT: 244 LBS | DIASTOLIC BLOOD PRESSURE: 64 MMHG | HEIGHT: 73 IN | TEMPERATURE: 96.9 F | BODY MASS INDEX: 32.34 KG/M2 | SYSTOLIC BLOOD PRESSURE: 137 MMHG

## 2025-06-09 DIAGNOSIS — J45.40 MODERATE PERSISTENT ASTHMA WITHOUT COMPLICATION (HHS-HCC): Primary | ICD-10-CM

## 2025-06-09 DIAGNOSIS — R05.3 CHRONIC COUGH: ICD-10-CM

## 2025-06-09 PROCEDURE — 1036F TOBACCO NON-USER: CPT | Performed by: STUDENT IN AN ORGANIZED HEALTH CARE EDUCATION/TRAINING PROGRAM

## 2025-06-09 PROCEDURE — 3078F DIAST BP <80 MM HG: CPT | Performed by: STUDENT IN AN ORGANIZED HEALTH CARE EDUCATION/TRAINING PROGRAM

## 2025-06-09 PROCEDURE — 99214 OFFICE O/P EST MOD 30 MIN: CPT | Performed by: STUDENT IN AN ORGANIZED HEALTH CARE EDUCATION/TRAINING PROGRAM

## 2025-06-09 PROCEDURE — 1159F MED LIST DOCD IN RCRD: CPT | Performed by: STUDENT IN AN ORGANIZED HEALTH CARE EDUCATION/TRAINING PROGRAM

## 2025-06-09 PROCEDURE — 3075F SYST BP GE 130 - 139MM HG: CPT | Performed by: STUDENT IN AN ORGANIZED HEALTH CARE EDUCATION/TRAINING PROGRAM

## 2025-06-09 RX ORDER — BENZONATATE 200 MG/1
200 CAPSULE ORAL 3 TIMES DAILY PRN
Qty: 60 CAPSULE | Refills: 2 | Status: SHIPPED | OUTPATIENT
Start: 2025-06-09 | End: 2025-08-08

## 2025-06-09 RX ORDER — MEPOLIZUMAB 100 MG/ML
100 INJECTION, SOLUTION SUBCUTANEOUS
Qty: 1 EACH | Refills: 6 | Status: SHIPPED | OUTPATIENT
Start: 2025-06-09

## 2025-06-09 ASSESSMENT — ENCOUNTER SYMPTOMS
TROUBLE SWALLOWING: 0
UNEXPECTED WEIGHT CHANGE: 0
ARTHRALGIAS: 0
CHILLS: 0
HEADACHES: 0
WHEEZING: 0
JOINT SWELLING: 0
SPEECH DIFFICULTY: 0
COUGH: 1
ABDOMINAL DISTENTION: 0
VOMITING: 0
BLOOD IN STOOL: 0
SHORTNESS OF BREATH: 1
SLEEP DISTURBANCE: 0
DIARRHEA: 0
FEVER: 0
CONSTIPATION: 0
COLOR CHANGE: 0
NAUSEA: 0
DIZZINESS: 0
DIFFICULTY URINATING: 0
LIGHT-HEADEDNESS: 0
ABDOMINAL PAIN: 0
CONFUSION: 0

## 2025-06-09 NOTE — PROGRESS NOTES
Department of Medicine  Division of Pulmonary, Critical Care, and Sleep Medicine  Follow-up  [facility]    85 y.o. male who presents today for a follow-up visit for asthma and chronic cough.    Their last office visit with me was in February 2025.    I have independently interviewed and examined the patient in the office and reviewed available records. The finalized note is available in the electronic medical record for review by initial referring physician or provider.    Interval History (6/9/2025):    Patient reports that since our last visit some of his symptoms have improved but he is fairly symptomatic with respect to his cough.  He has been fairly aggressive about treating his postnasal drip symptoms.  He most recently saw his ENT back in February who put him on a course of prednisone and doxycycline.  He stated that this helped both his shortness of breath and cough but his symptoms returned within a few weeks.  He has been relying on Robitussin DM 4-5 times per day to control his cough without much success.  He describes his cough as sometimes productive with thick, pale-colored sputum.  His recent blood work performed last week showed an elevated eosinophil count of 316.  His value prior to that was in September 2024 at 850.    Immunization History:  Immunization History   Administered Date(s) Administered    COVID-19, mRNA, LNP-S, PF, 30 mcg/0.3 mL dose 01/26/2021, 02/16/2021, 09/27/2021    Flu vaccine (IIV4), preservative free *Check age/dose* 12/10/2013    Flu vaccine, quadrivalent, high-dose, preservative free, age 65y+ (FLUZONE) 10/10/2023    Flu vaccine, trivalent, preservative free, HIGH-DOSE, age 65y+ (Fluzone) 10/25/2024    Flu vaccine, trivalent, preservative free, age 6 months and greater (Fluarix/Fluzone/Flulaval) 11/17/2012, 10/18/2014    Influenza, Unspecified 12/01/2011, 11/16/2015, 02/20/2016, 12/20/2016, 10/12/2017, 10/12/2018, 11/11/2019, 10/08/2020, 10/23/2020,  09/27/2021, 09/28/2022    Influenza, seasonal, injectable 11/01/2022, 10/16/2023    Moderna COVID-19 vaccine, 12 years and older (50mcg/0.5mL)(Spikevax) 10/25/2024    Pfizer COVID-19 vaccine, 12 years and older, (30mcg/0.3mL) (Comirnaty) 11/18/2023    Pfizer Gray Cap SARS-CoV-2 05/13/2022    Pfizer Purple Cap SARS-CoV-2 05/01/2022    Pneumococcal conjugate vaccine, 13-valent (PREVNAR 13) 01/15/2016    Pneumococcal polysaccharide vaccine, 23-valent, age 2 years and older (PNEUMOVAX 23) 08/14/2008, 04/12/2021, 10/01/2022    RSV, 60 Years And Older (AREXVY) 10/23/2023    Td vaccine, age 7 years and older (TENIVAC) 06/15/2010    Tdap vaccine, age 7 year and older (BOOSTRIX, ADACEL) 04/21/2022    Zoster vaccine, recombinant, adult (SHINGRIX) 07/09/2022, 09/28/2022    Zoster, live 02/21/2013       Family History:  Family History[1]    Social History:  Social History[2]    Current Medications:  Current Outpatient Medications   Medication Instructions    albuterol 90 mcg/actuation inhaler 1 puff, inhalation, Every 4 hours PRN    albuterol 2.5 mg, nebulization, Every 6 hours PRN    apixaban (Eliquis) 5 mg tablet TAKE ONE TABLET BY MOUTH TWO TIMES A DAY    atorvastatin (LIPITOR) 80 mg, oral, Daily    benzonatate (TESSALON) 200 mg, oral, 3 times daily PRN, Do not crush or chew.    calcium 500 mg calcium (1,250 mg) tablet 1 tablet, Daily    cholecalciferol (Vitamin D3) 5,000 Units tablet Daily    dutasteride (AVODART) 0.5 mg, Daily    famotidine (PEPCID) 40 mg, oral, 2 times daily    fluticasone-umeclidin-vilanter (Trelegy Ellipta) 200-62.5-25 mcg blister with device 1 puff, inhalation, Daily, RINSE MOUTH AFTER EACH USE TO AVOID ORAL THRUSH.    ipratropium-albuteroL (Duo-Neb) 0.5-2.5 mg/3 mL nebulizer solution 3 mL, nebulization, 4 times daily PRN    loratadine (CLARITIN) 10 mg, Daily    Mucinex 1,200 mg, Every 12 hours PRN    Nucala 100 mg, subcutaneous, Every 28 days    omega 3-dha-epa-fish oil (Fish OiL) 1,200 (144-216) mg  "capsule Take by mouth.    pantoprazole (PROTONIX) 40 mg, oral, Daily, Do not crush, chew, or split    propranolol (INDERAL) 20 mg, oral, 2 times daily    sotalol (BETAPACE) 80 mg, oral, Every 12 hours    terazosin (HYTRIN) 5 mg, oral, Nightly    terazosin (HYTRIN) 10 mg, oral, Nightly    ursodiol (ACTIGALL) 300 mg, oral, 2 times daily    valsartan (DIOVAN) 80 mg, oral, Daily, for blood pressure    zinc gluconate 50 mg tablet Take by mouth.        Drug Allergies/Intolerances:  RX Allergies[3]     Review of Systems:  Review of Systems   Constitutional:  Negative for chills, fever and unexpected weight change.   HENT:  Negative for congestion and trouble swallowing.    Respiratory:  Positive for cough and shortness of breath. Negative for wheezing.    Cardiovascular:  Negative for chest pain.   Gastrointestinal:  Negative for abdominal distention, abdominal pain, blood in stool, constipation, diarrhea, nausea and vomiting.   Genitourinary:  Negative for difficulty urinating.   Musculoskeletal:  Negative for arthralgias and joint swelling.   Skin:  Negative for color change.   Neurological:  Negative for dizziness, speech difficulty, light-headedness and headaches.   Psychiatric/Behavioral:  Negative for confusion and sleep disturbance.         All other review of systems are negative and/or non-contributory.    Physical Examination:  /64   Pulse 55   Temp 36.1 °C (96.9 °F)   Ht 1.854 m (6' 1\")   Wt 111 kg (244 lb)   SpO2 96%   BMI 32.19 kg/m²      Measured SpO2 is with ambient air at rest    Physical Exam  Constitutional:       General: He is awake.      Appearance: Normal appearance.   HENT:      Head: Normocephalic and atraumatic.      Nose: Nose normal.      Mouth/Throat:      Mouth: Mucous membranes are moist.   Eyes:      Pupils: Pupils are equal, round, and reactive to light.   Neck:      Thyroid: No thyroid mass.      Trachea: Phonation normal.   Cardiovascular:      Rate and Rhythm: Normal rate and " regular rhythm.   Pulmonary:      Effort: Pulmonary effort is normal. No respiratory distress.      Breath sounds: Normal air entry. Decreased breath sounds present. No wheezing, rhonchi or rales.   Abdominal:      General: Bowel sounds are normal. There is no distension.      Palpations: Abdomen is soft.      Tenderness: There is no abdominal tenderness.   Musculoskeletal:      Cervical back: Neck supple.      Right lower leg: No edema.      Left lower leg: No edema.   Skin:     General: Skin is warm.      Capillary Refill: Capillary refill takes less than 2 seconds.   Neurological:      General: No focal deficit present.      Mental Status: He is alert and oriented to person, place, and time. Mental status is at baseline.      Cranial Nerves: Cranial nerves 2-12 are intact.      Motor: Motor function is intact.   Psychiatric:         Attention and Perception: Attention and perception normal.         Mood and Affect: Mood normal.         Speech: Speech normal.         Behavior: Behavior normal.         Pulmonary Function Test Results     Failed to redirect to the Timeline version of the [employer] SmartLink.      Chest Radiograph     XR chest 1 view 12/04/2024    Narrative  * * *Final Report* * *    DATE OF EXAM: Dec  4 2024 10:15AM    LFX   [id number]  -  XR CHEST 1V FRONTAL PORT  / ACCESSION #  [id number]    PROCEDURE REASON: Shortness of breath    * * * * Physician Interpretation * * * *    EXAMINATION:  CHEST RADIOGRAPH (PORTABLE SINGLE VIEW AP)    Exam Date/Time:  12/4/2024 10:15 AM  CLINICAL HISTORY: Shortness of breath  MQ:  XCPR_5  Comparison:  Chest x-ray on 11/09/2020    RESULT:    Lines, tubes, and devices:  There appears be an implantable loop recorder  in place.    Lungs and pleura:  No consolidative opacities or mass lesion identified.  There is a stable focal calcification in the right upper lung.  No large  pleural effusions or pneumothorax.    Cardiomediastinal silhouette:  The cardiac silhouette is within  normal  limits.  There is tortuosity of the thoracic aorta.    Other:  None.    Impression  IMPRESSION:    No acute findings.                    : PSCB  Transcribe Date/Time: Dec  4 2024 10:26A    Dictated by : CONCEPCIÓN ARANDA MD    This examination was interpreted and the report reviewed and  electronically signed by:  CONCEPCIÓN ARANDA MD on Dec  4 2024 10:28AM  EST      Echocardiogram     No results found for this or any previous visit from the past 365 days.       Chest CT Scan     No results found for this or any previous visit from the past 365 days.       Relevant Laboratory Tests       Assessment and Plan / Recommendations:  Problem List Items Addressed This Visit          Pulmonary and Pneumonias    Asthma - Primary    Relevant Medications    mepolizumab (Nucala) 100 mg/mL auto-injector  Patient should continue his existing bronchodilator regimen  Given the patient's persistent elevation in the eosinophils, I think that were realizing that despite aggressive but conservative management of the patient's postnasal drip and asthma that we are not getting successful control of his symptoms.  My recommendation to the patient is that he consider initiation of a biologic.  After discussing his treatment options he selected Nucala as a potential choice.  We will submit the order today.     Other Visit Diagnoses         Chronic cough        Relevant Medications    benzonatate (Tessalon) 200 mg capsule  To help with the frequency of his coughing fits some going to prescribe Tessalon Perles.  I explained that Tessalon does not treat the cough itself but will help decrease the frequency and duration of the coughing fits that he is currently experiencing.  I would like him to use the medication twice a day for the next 2 weeks and as needed afterwards.             Follow-up: July/August 2025    Ricky Reina MD, MSBS   Pulmonary/Critical Care  4:25 PM  06/09/25  [address], [address] [address]  Office:  [phone]  Fax: [phone]  Cell: [phone]         [1]   Family History  Problem Relation Name Age of Onset    Diabetes Mother      Cancer Mother      Breast cancer Mother      Diabetes Father      Cancer Father      Prostate cancer Father      Cancer Sister      Breast cancer Sister      Other (oral cancer) Brother      Cancer Daughter      Breast cancer Daughter      Other (breast cancer in female) Other     [2]   Social History  Socioeconomic History    Marital status:    Tobacco Use    Smoking status: Former     Types: Cigars, Pipe    Smokeless tobacco: Never   Vaping Use    Vaping status: Never Used   Substance and Sexual Activity    Alcohol use: Yes    Drug use: Never    Sexual activity: Defer   [3]   Allergies  Allergen Reactions    Montelukast Other    Fenofibrate Unknown    Finasteride Itching    Sulfamethizole Itching    Iodinated Contrast Media Nausea/vomiting    Sulfamethoxazole-Trimethoprim Rash and Other     FACIAL REDNESS AND SWEATING

## 2025-06-10 ENCOUNTER — SPECIALTY PHARMACY (OUTPATIENT)
Dept: PHARMACY | Facility: CLINIC | Age: 85
End: 2025-06-10

## 2025-06-12 ENCOUNTER — SPECIALTY PHARMACY (OUTPATIENT)
Dept: PHARMACY | Facility: CLINIC | Age: 85
End: 2025-06-12

## 2025-06-12 PROCEDURE — RXMED WILLOW AMBULATORY MEDICATION CHARGE

## 2025-06-14 ENCOUNTER — PHARMACY VISIT (OUTPATIENT)
Dept: PHARMACY | Facility: CLINIC | Age: 85
End: 2025-06-14
Payer: COMMERCIAL

## 2025-06-18 ENCOUNTER — SPECIALTY PHARMACY (OUTPATIENT)
Dept: PHARMACY | Facility: CLINIC | Age: 85
End: 2025-06-18

## 2025-06-18 NOTE — PROGRESS NOTES
[facility] Clinical Note  Initial Patient Education     Introduction  Juan Carlos Nguyen is a 85 y.o. male who is on the specialty pharmacy service for management of: Pulmonology Core.    Juan Carlos Nguyen is initiating the following therapy: Nucala 100mg pen under the skin every 28 days.    Medication receipt date: 6/17/2025    Duration of therapy: Maintenance    The most recent encounter visit with the referring prescriber Ricky Reina MD on 6/9/2025 was reviewed. Pharmacy will continue to collaborate in the care of this patient with the referring prescriber.    Clinical Background  An initial assessment was conducted prior to first fill of the medication to determine the appropriateness of therapy given the patient's diagnosis, medication list, comorbidities, allergies, medical history, patient's ability to self administer medication, and therapeutic goals based on possible outcomes of therapy. Refer to initial assessment task completed on 6/12/2025.    Labs for clinical appropriateness that were reviewed include:   Asthma/Immunology - CBC-diff:   Lab Results   Component Value Date    WBC 5.4 06/06/2025    RBC 3.70 (L) 06/06/2025    HGB 11.3 (L) 06/06/2025    HCT 35.6 (L) 06/06/2025    MCV 96.2 06/06/2025    MCHC 31.7 (L) 06/06/2025     06/06/2025    RDW 12.9 06/06/2025    NEUTOPHILPCT 62.3 09/20/2024    IGPCT 0.3 09/20/2024    LYMPHOPCT 20.8 06/03/2025    MONOPCT 12.6 06/03/2025    EOSPCT 7.9 06/03/2025    BASOPCT 0.5 06/03/2025    NEUTROABS 4.22 09/20/2024    LYMPHSABS 0.98 09/20/2024    MONOSABS 0.66 09/20/2024    EOSABS 316 06/03/2025    BASOSABS 20 06/03/2025     Education/Discussion  Juan Carlos was contacted on 6/18/2025 at 9:30 AM for a pharmacy visit with encounter number [id number] from:   [facility]  [address] [place] [id number]  Dept: [phone]  Dept Fax: [phone]  Juan Carlos consented to a Telephone visit, which was  "performed.    Medication Start Date (planned or actual): 6/18/2025  Education was conducted prior to start of therapy? Yes    Education discussed includes the following:  Patient Education  Counseled the Patient on the Following : Theraputic rationale and expected outcomes, Expected duration of therapy, Doses and administration, Adherence and missed doses, Possible drug interactions, Possible side effects and management, Safe handling, storage, and disposal, Pharmacy contact information  Learner: Patient  Education Method: Explanation  Education Response: Verbalizes understanding  Additional details of the medication specific counseling are found within the linked patient education flowsheet.     The follow up timeline was discussed. Every person responds to and reacts to therapy differently. Patient should be assessed for efficacy and tolerability in approximately: 3 months    Provided education on goals and possible outcomes of therapy:  Adherence with therapy  Timely completion of appropriate labs  Timely and appropriate follow up with provider  Identify and address medication interactions with presciption medications, OTC medications and supplements  Optimize or maintain quality of life  Asthma/Immunology: Improve FEV1 (asthma, COPD target)  Reduce frequency of asthma symptoms such as coughing/wheezing, shortness of breath, and chest tightness  Reduce need for \"prn\" inhalers (asthma)    The importance of adherence was discussed and they were advised to take the medication as prescribed by their provider.     Impression/Plan  Review and Assessment   Reviewed During This Encounter: Medications, Problem list  Medications Assessed for Appropriate Use, Dose, Route, Frequency, and Duration: Yes  Medication Reconciliation Completed: Yes (At D[facility])  Drug Interactions Evaluated: Yes (At DUR)  Clinically Relevant Drug Interactions Identified: No    This patient has been identified as high risk due to Geriatric (over 65 " years of age).  The following action was taken: Patient/caregiver encouraged to participate in patient management program.    QOL/Patient Satisfaction  Rate your quality of life on scale of 1-10:  (Unable to assess)  Rate your satisfaction with  Specialty Pharmacy on scale of 1-10:  (Unable to assess)    The  Specialty Pharmacy Welcome packet may be viewed here:   Specialty Pharmacy Welcome Packet     Or by scanning QR code:      Provided contact information ([phone]) for [facility] and reviewed dispensing process, refill timeline and patient management follow up. Advised to contact the pharmacy if there are any adverse effects and/or changes to medication list, including prescriptions, OTC medications, herbal products, or supplements. Confirmed understanding of education conducted during assessment. All questions and concerns were addressed and patient was encouraged to reach out for additional questions or concerns.    Shana Márquez, PharmD

## 2025-06-27 ENCOUNTER — HOSPITAL ENCOUNTER (OUTPATIENT)
Dept: CARDIOLOGY | Facility: HOSPITAL | Age: 85
Discharge: HOME | End: 2025-06-27
Payer: MEDICARE

## 2025-06-27 DIAGNOSIS — Z95.818 PRESENCE OF OTHER CARDIAC IMPLANTS AND GRAFTS: ICD-10-CM

## 2025-06-27 DIAGNOSIS — R55 SYNCOPE AND COLLAPSE: ICD-10-CM

## 2025-06-27 PROCEDURE — 93298 REM INTERROG DEV EVAL SCRMS: CPT

## 2025-07-02 ENCOUNTER — TELEPHONE (OUTPATIENT)
Facility: CLINIC | Age: 85
End: 2025-07-02
Payer: MEDICARE

## 2025-07-07 PROBLEM — I25.2 HISTORY OF ST ELEVATION MYOCARDIAL INFARCTION (STEMI): Status: RESOLVED | Noted: 2023-05-04 | Resolved: 2025-07-07

## 2025-07-07 NOTE — PROGRESS NOTES
Chief Complaint:   No chief complaint on file.     History Of Present Illness:    Juan Carlos Nguyen is a 85 y.o. male with a history of paroxysmal atrial fibrillation (s/p loop recorder), nonobstructive CAD, hypertension, dyslipidemia, and stroke here for establishment of cardiovascular care.    Overall he is doing well.  He was having some issues with his asthma and his pulmonologist, Dr. Burnette, ordered trilogy.  This is help with the asthma.  He is also been having a cough and was started on Nucala.  Was told this might take up to 6 months to help.  He has an appoint with an allergist coming soon.    He swims about 3 days a week for an hour at a time.  Denies any exertional chest pain or shortness of breath.    Me that he lost his sister to an aortic aneurysm.  He is 1 of 7 children.  He had another brother who passed away of cancer several years ago.    Echocardiogram 8/15/2023: EF 60 to 65%.  Moderate left atrial enlargement.  Mild to moderate MR.  Mild TR.  RVSP 40 mmHg.    Catheterization 7/25/2020: Mild disease of the LAD.  LCx codominant with mild disease.  Codominant RCA with mild disease.  EF 60%.  PA pressure 30 mmHg.  LVEDP 6 mmHg.  Wedge 13 mmHg.  Cardiac output and index 7.104.3     Allergies:  Montelukast, Fenofibrate, Finasteride, Sulfamethizole, Iodinated contrast media, and Sulfamethoxazole-trimethoprim    Outpatient Medications:  Current Outpatient Medications   Medication Instructions    albuterol 90 mcg/actuation inhaler 1 puff, inhalation, Every 4 hours PRN    albuterol 2.5 mg, nebulization, Every 6 hours PRN    apixaban (Eliquis) 5 mg tablet TAKE ONE TABLET BY MOUTH TWO TIMES A DAY    atorvastatin (LIPITOR) 80 mg, oral, Daily    benzonatate (TESSALON) 200 mg, oral, 3 times daily PRN, Do not crush or chew.    calcium 500 mg calcium (1,250 mg) tablet 1 tablet, Daily    cholecalciferol (Vitamin D3) 5,000 Units tablet Daily    dutasteride (AVODART) 0.5 mg, Daily    famotidine (PEPCID) 40 mg, oral,  "2 times daily    fluticasone-umeclidin-vilanter (Trelegy Ellipta) 200-62.5-25 mcg blister with device 1 puff, inhalation, Daily, RINSE MOUTH AFTER EACH USE TO AVOID ORAL THRUSH.    loratadine (CLARITIN) 10 mg, Daily    Mucinex 1,200 mg, Every 12 hours PRN    Nucala 100 mg, subcutaneous, Every 28 days    omega 3-dha-epa-fish oil (Fish OiL) 1,200 (144-216) mg capsule Take by mouth.    ondansetron ODT (Zofran-ODT) 8 mg disintegrating tablet DISSOLVE ONE TABLET IN MOUTH ONCE DAILY AS NEEDED    pantoprazole (PROTONIX) 40 mg, oral, Daily, Do not crush, chew, or split    propranolol (INDERAL) 20 mg, oral, 2 times daily    sotalol (BETAPACE) 80 mg, oral, Every 12 hours    terazosin (HYTRIN) 5 mg, oral, Nightly    terazosin (HYTRIN) 10 mg, oral, Nightly    ursodiol (ACTIGALL) 300 mg, oral, 2 times daily    valsartan (DIOVAN) 80 mg, oral, Daily, for blood pressure    zinc gluconate 50 mg tablet Take by mouth.       Last Recorded Vitals:  Visit Vitals  /62 (BP Location: Left arm, Patient Position: Sitting)   Pulse 53   Ht 1.854 m (6' 1\")   Wt 111 kg (244 lb)   SpO2 94%   BMI 32.19 kg/m²   Smoking Status Former   BSA 2.39 m²      LASTWT(3):   Wt Readings from Last 3 Encounters:   07/08/25 111 kg (244 lb)   06/09/25 111 kg (244 lb)   05/08/25 109 kg (241 lb)       Physical Exam:  In general: alert and in no acute distress.   HEENT: Carotid upstrokes normal with no bruits. JVP is normal.   Pulmonary: Clear to auscultation bilaterally.  Cardiovascular: S1,S2, regular. No appreciable murmurs, rubs or gallops.   Lower extremities: Warm. 2+ distal pulses.  1+ bilateral lower extremity edema.     Last Labs:  CBC -  Recent Labs     06/06/25  1312 06/03/25  0904 11/15/24  1055   WBC 5.4 4.0 6.5   HGB 11.3* 11.6* 12.8*   HCT 35.6* 36.8* 41.0    177 146*   MCV 96.2 95.1 98       CMP -  Recent Labs     06/03/25  0904 11/15/24  1055 05/23/24  0930 09/08/23  1607 08/15/23  0736 08/14/23  1252 08/04/20  0014 07/23/20  0450   NA " 146 143 143   < > 140 140   < > 143   K 5.2 4.5 4.8   < > 4.2 3.9   < > 3.8    107 106   < > 106 105   < > 106   CO2 29 28 30   < > 27 27   < > 27   ANIONGAP 8 13 12   < > 11 12   < > 14   BUN 18 21 18   < > 17 19   < > 21   CREATININE 1.08 1.06 1.07   < > 0.89 1.15   < > 0.91   EGFR 68 70 69  --   --   --   --   --    MG  --   --   --   --  1.87 1.82  --  2.10    < > = values in this interval not displayed.     Recent Labs     06/03/25  0904 11/15/24  1055 05/23/24  0930   ALBUMIN 3.6 3.7 3.8   ALKPHOS 59 51 47   ALT 11 12 13   AST 15 17 17   BILITOT 0.6 0.7 0.6       LIPID PANEL -   Recent Labs     11/15/24  1055 05/23/24  0930 08/14/23  1252 04/27/23  1125 11/10/22  0908   CHOL 115 112 101 117 107   LDLCALC 53 51  --   --   --    LDLF  --   --  53 54 46   HDL 51.9 52.4 37.5* 54.8 52.0   TRIG 52 45 55 43 47       Recent Labs     08/14/23  1252 07/22/20  0546 07/21/20  1718   BNP  --   --  905*   HGBA1C 5.8* 5.5  --              Assessment/Plan   1) paroxysmal atrial fibrillation: History of stroke.  Anticoagulated with Eliquis.  No symptomatic palpitations.  Continue to follow with EP.  Would asked that he stop the fish oil as this does not lead to less MI or stroke but does slightly increase the risk of atrial fibrillation.    2) CAD: Mild coronary artery disease by catheterization in the past.  No signs or symptoms to suggest progression of underlying coronary disease.  Continue to observe.    3) dyslipidemia: LDL less than 70.  Due to his nonobstructive coronary disease I would recommend he continue atorvastatin 80 mg daily.      4) hypertension: Blood pressure well-controlled.  Continue with valsartan 80 mg daily.    5) edema: Minimal on exam.  He is wearing compression stockings.    6) family history of ruptured aortic aneurysm.  Fortunately has had a CT scan of the abdomen in 2023 as well as CT of chest in 2021.  There was no evidence of aneurysmal disease.  No need for screening at this time.    7)  follow-up: 1 year or sooner if needed.      Tomas Barraza MD

## 2025-07-08 ENCOUNTER — SPECIALTY PHARMACY (OUTPATIENT)
Dept: PHARMACY | Facility: CLINIC | Age: 85
End: 2025-07-08

## 2025-07-08 ENCOUNTER — OFFICE VISIT (OUTPATIENT)
Dept: CARDIOLOGY | Facility: CLINIC | Age: 85
End: 2025-07-08
Payer: MEDICARE

## 2025-07-08 ENCOUNTER — HOSPITAL ENCOUNTER (OUTPATIENT)
Dept: RADIOLOGY | Facility: HOSPITAL | Age: 85
Discharge: HOME | End: 2025-07-08
Payer: MEDICARE

## 2025-07-08 VITALS
BODY MASS INDEX: 32.34 KG/M2 | SYSTOLIC BLOOD PRESSURE: 102 MMHG | WEIGHT: 244 LBS | OXYGEN SATURATION: 94 % | HEART RATE: 53 BPM | DIASTOLIC BLOOD PRESSURE: 62 MMHG | HEIGHT: 73 IN

## 2025-07-08 DIAGNOSIS — I10 ESSENTIAL HYPERTENSION, BENIGN: ICD-10-CM

## 2025-07-08 DIAGNOSIS — E78.2 MIXED HYPERLIPIDEMIA: ICD-10-CM

## 2025-07-08 DIAGNOSIS — D17.71 BENIGN LIPOMATOUS NEOPLASM OF KIDNEY: ICD-10-CM

## 2025-07-08 DIAGNOSIS — I48.0 PAROXYSMAL ATRIAL FIBRILLATION (MULTI): ICD-10-CM

## 2025-07-08 DIAGNOSIS — I25.10 MILD CAD: Primary | ICD-10-CM

## 2025-07-08 PROCEDURE — 3074F SYST BP LT 130 MM HG: CPT | Performed by: INTERNAL MEDICINE

## 2025-07-08 PROCEDURE — 99212 OFFICE O/P EST SF 10 MIN: CPT | Mod: 25

## 2025-07-08 PROCEDURE — 1160F RVW MEDS BY RX/DR IN RCRD: CPT | Performed by: INTERNAL MEDICINE

## 2025-07-08 PROCEDURE — 99214 OFFICE O/P EST MOD 30 MIN: CPT | Performed by: INTERNAL MEDICINE

## 2025-07-08 PROCEDURE — RXMED WILLOW AMBULATORY MEDICATION CHARGE

## 2025-07-08 PROCEDURE — 76770 US EXAM ABDO BACK WALL COMP: CPT | Performed by: STUDENT IN AN ORGANIZED HEALTH CARE EDUCATION/TRAINING PROGRAM

## 2025-07-08 PROCEDURE — 1159F MED LIST DOCD IN RCRD: CPT | Performed by: INTERNAL MEDICINE

## 2025-07-08 PROCEDURE — 93010 ELECTROCARDIOGRAM REPORT: CPT | Performed by: INTERNAL MEDICINE

## 2025-07-08 PROCEDURE — 93005 ELECTROCARDIOGRAM TRACING: CPT | Performed by: INTERNAL MEDICINE

## 2025-07-08 PROCEDURE — 76770 US EXAM ABDO BACK WALL COMP: CPT

## 2025-07-08 PROCEDURE — 3078F DIAST BP <80 MM HG: CPT | Performed by: INTERNAL MEDICINE

## 2025-07-08 PROCEDURE — 1036F TOBACCO NON-USER: CPT | Performed by: INTERNAL MEDICINE

## 2025-07-08 RX ORDER — ONDANSETRON 8 MG/1
TABLET, ORALLY DISINTEGRATING ORAL
COMMUNITY
Start: 2025-01-06

## 2025-07-09 ENCOUNTER — PHARMACY VISIT (OUTPATIENT)
Dept: PHARMACY | Facility: CLINIC | Age: 85
End: 2025-07-09
Payer: COMMERCIAL

## 2025-07-31 DIAGNOSIS — I49.9 CARDIAC ARRHYTHMIA, UNSPECIFIED CARDIAC ARRHYTHMIA TYPE: ICD-10-CM

## 2025-07-31 RX ORDER — PROPRANOLOL HYDROCHLORIDE 20 MG/1
20 TABLET ORAL 2 TIMES DAILY
Qty: 180 TABLET | Refills: 1 | Status: SHIPPED | OUTPATIENT
Start: 2025-07-31

## 2025-08-01 ENCOUNTER — HOSPITAL ENCOUNTER (OUTPATIENT)
Dept: CARDIOLOGY | Facility: HOSPITAL | Age: 85
Discharge: HOME | End: 2025-08-01
Payer: MEDICARE

## 2025-08-01 ENCOUNTER — SPECIALTY PHARMACY (OUTPATIENT)
Dept: PHARMACY | Facility: CLINIC | Age: 85
End: 2025-08-01

## 2025-08-01 DIAGNOSIS — R55 SYNCOPE AND COLLAPSE: ICD-10-CM

## 2025-08-01 DIAGNOSIS — Z95.818 PRESENCE OF OTHER CARDIAC IMPLANTS AND GRAFTS: ICD-10-CM

## 2025-08-01 PROCEDURE — 93298 REM INTERROG DEV EVAL SCRMS: CPT | Performed by: INTERNAL MEDICINE

## 2025-08-01 PROCEDURE — 93298 REM INTERROG DEV EVAL SCRMS: CPT

## 2025-08-01 PROCEDURE — RXMED WILLOW AMBULATORY MEDICATION CHARGE

## 2025-08-06 ENCOUNTER — PHARMACY VISIT (OUTPATIENT)
Dept: PHARMACY | Facility: CLINIC | Age: 85
End: 2025-08-06
Payer: COMMERCIAL

## 2025-08-07 DIAGNOSIS — E78.5 HYPERLIPIDEMIA, UNSPECIFIED HYPERLIPIDEMIA TYPE: ICD-10-CM

## 2025-08-07 RX ORDER — ATORVASTATIN CALCIUM 80 MG/1
80 TABLET, FILM COATED ORAL DAILY
Qty: 90 TABLET | Refills: 1 | Status: SHIPPED | OUTPATIENT
Start: 2025-08-07

## 2025-08-11 ENCOUNTER — OFFICE VISIT (OUTPATIENT)
Dept: PULMONOLOGY | Facility: CLINIC | Age: 85
End: 2025-08-11
Payer: MEDICARE

## 2025-08-11 VITALS
HEART RATE: 52 BPM | SYSTOLIC BLOOD PRESSURE: 133 MMHG | WEIGHT: 241 LBS | OXYGEN SATURATION: 98 % | BODY MASS INDEX: 31.94 KG/M2 | TEMPERATURE: 97.3 F | DIASTOLIC BLOOD PRESSURE: 61 MMHG | HEIGHT: 73 IN | RESPIRATION RATE: 16 BRPM

## 2025-08-11 DIAGNOSIS — J40 BRONCHITIS: ICD-10-CM

## 2025-08-11 DIAGNOSIS — J45.40 MODERATE PERSISTENT ASTHMA WITHOUT COMPLICATION (HHS-HCC): Primary | ICD-10-CM

## 2025-08-11 PROCEDURE — 1159F MED LIST DOCD IN RCRD: CPT | Performed by: STUDENT IN AN ORGANIZED HEALTH CARE EDUCATION/TRAINING PROGRAM

## 2025-08-11 PROCEDURE — 3075F SYST BP GE 130 - 139MM HG: CPT | Performed by: STUDENT IN AN ORGANIZED HEALTH CARE EDUCATION/TRAINING PROGRAM

## 2025-08-11 PROCEDURE — 99212 OFFICE O/P EST SF 10 MIN: CPT | Performed by: STUDENT IN AN ORGANIZED HEALTH CARE EDUCATION/TRAINING PROGRAM

## 2025-08-11 PROCEDURE — G2211 COMPLEX E/M VISIT ADD ON: HCPCS | Performed by: STUDENT IN AN ORGANIZED HEALTH CARE EDUCATION/TRAINING PROGRAM

## 2025-08-11 PROCEDURE — 3078F DIAST BP <80 MM HG: CPT | Performed by: STUDENT IN AN ORGANIZED HEALTH CARE EDUCATION/TRAINING PROGRAM

## 2025-08-11 PROCEDURE — 99214 OFFICE O/P EST MOD 30 MIN: CPT | Performed by: STUDENT IN AN ORGANIZED HEALTH CARE EDUCATION/TRAINING PROGRAM

## 2025-08-11 RX ORDER — AMOXICILLIN AND CLAVULANATE POTASSIUM 875; 125 MG/1; MG/1
1 TABLET, FILM COATED ORAL 2 TIMES DAILY
Qty: 20 TABLET | Refills: 0 | Status: SHIPPED | OUTPATIENT
Start: 2025-08-11 | End: 2025-08-21

## 2025-08-11 ASSESSMENT — ENCOUNTER SYMPTOMS
ABDOMINAL DISTENTION: 0
DIZZINESS: 0
FEVER: 0
CONFUSION: 0
CONSTIPATION: 0
LIGHT-HEADEDNESS: 0
WHEEZING: 0
SPEECH DIFFICULTY: 0
COLOR CHANGE: 0
UNEXPECTED WEIGHT CHANGE: 0
BLOOD IN STOOL: 0
DIFFICULTY URINATING: 0
JOINT SWELLING: 0
DIARRHEA: 0
ABDOMINAL PAIN: 0
SHORTNESS OF BREATH: 0
VOMITING: 0
TROUBLE SWALLOWING: 0
COUGH: 1
NAUSEA: 0
CHILLS: 0
HEADACHES: 0
ARTHRALGIAS: 0
SLEEP DISTURBANCE: 0

## 2025-08-23 PROBLEM — R31.0 GROSS HEMATURIA: Status: RESOLVED | Noted: 2023-05-04 | Resolved: 2025-08-23

## 2025-08-23 PROBLEM — R29.90 STROKE-LIKE SYMPTOMS: Status: RESOLVED | Noted: 2023-08-25 | Resolved: 2025-08-23

## 2025-08-23 PROBLEM — I49.3 FREQUENT PVCS: Status: RESOLVED | Noted: 2023-05-04 | Resolved: 2025-08-23

## 2025-08-23 PROBLEM — L57.0 ACTINIC KERATOSIS: Status: RESOLVED | Noted: 2023-05-04 | Resolved: 2025-08-23

## 2025-08-23 PROBLEM — L82.0 SEBORRHEIC KERATOSIS, INFLAMED: Status: RESOLVED | Noted: 2023-05-04 | Resolved: 2025-08-23

## 2025-08-23 PROBLEM — J31.0 CHRONIC RHINITIS: Status: ACTIVE | Noted: 2023-05-04

## 2025-08-23 PROBLEM — R39.15 URINARY URGENCY: Status: RESOLVED | Noted: 2024-11-20 | Resolved: 2025-08-23

## 2025-08-25 ENCOUNTER — TELEPHONE (OUTPATIENT)
Dept: CRITICAL CARE MEDICINE | Facility: HOSPITAL | Age: 85
End: 2025-08-25

## 2025-08-25 ENCOUNTER — APPOINTMENT (OUTPATIENT)
Dept: ALLERGY | Facility: CLINIC | Age: 85
End: 2025-08-25
Payer: MEDICARE

## 2025-08-25 VITALS
SYSTOLIC BLOOD PRESSURE: 130 MMHG | HEIGHT: 73 IN | DIASTOLIC BLOOD PRESSURE: 64 MMHG | WEIGHT: 245 LBS | BODY MASS INDEX: 32.47 KG/M2

## 2025-08-25 DIAGNOSIS — R05.1 ACUTE COUGH: ICD-10-CM

## 2025-08-25 DIAGNOSIS — J45.50: Primary | ICD-10-CM

## 2025-08-25 LAB
FEV1/FVC: 77 %
FEV1: 56 LITERS
FVC: 72 LITERS

## 2025-08-25 PROCEDURE — 94375 RESPIRATORY FLOW VOLUME LOOP: CPT | Performed by: ALLERGY & IMMUNOLOGY

## 2025-08-25 PROCEDURE — 99204 OFFICE O/P NEW MOD 45 MIN: CPT | Performed by: ALLERGY & IMMUNOLOGY

## 2025-08-25 PROCEDURE — 95004 PERQ TESTS W/ALRGNC XTRCS: CPT | Performed by: ALLERGY & IMMUNOLOGY

## 2025-08-25 PROCEDURE — 95024 IQ TESTS W/ALLERGENIC XTRCS: CPT | Performed by: ALLERGY & IMMUNOLOGY

## 2025-08-29 DIAGNOSIS — I27.20 PULMONARY HYPERTENSION (MULTI): ICD-10-CM

## 2025-08-29 RX ORDER — VALSARTAN 80 MG/1
80 TABLET ORAL DAILY
Qty: 90 TABLET | Refills: 1 | Status: SHIPPED | OUTPATIENT
Start: 2025-08-29

## 2025-09-05 ENCOUNTER — HOSPITAL ENCOUNTER (OUTPATIENT)
Dept: CARDIOLOGY | Facility: HOSPITAL | Age: 85
Discharge: HOME | End: 2025-09-05
Payer: MEDICARE

## 2025-09-05 DIAGNOSIS — Z95.818 PRESENCE OF OTHER CARDIAC IMPLANTS AND GRAFTS: ICD-10-CM

## 2025-09-05 DIAGNOSIS — R55 SYNCOPE AND COLLAPSE: ICD-10-CM

## 2025-09-05 PROCEDURE — 93298 REM INTERROG DEV EVAL SCRMS: CPT

## 2025-09-16 ENCOUNTER — APPOINTMENT (OUTPATIENT)
Dept: OPHTHALMOLOGY | Facility: CLINIC | Age: 85
End: 2025-09-16
Payer: MEDICARE

## 2025-10-20 ENCOUNTER — APPOINTMENT (OUTPATIENT)
Dept: CARDIOLOGY | Facility: CLINIC | Age: 85
End: 2025-10-20
Payer: MEDICARE

## 2025-11-13 ENCOUNTER — APPOINTMENT (OUTPATIENT)
Dept: PRIMARY CARE | Facility: CLINIC | Age: 85
End: 2025-11-13
Payer: MEDICARE

## 2025-11-14 ENCOUNTER — APPOINTMENT (OUTPATIENT)
Dept: PULMONOLOGY | Facility: CLINIC | Age: 85
End: 2025-11-14
Payer: MEDICARE

## 2025-11-21 ENCOUNTER — APPOINTMENT (OUTPATIENT)
Dept: OTOLARYNGOLOGY | Facility: CLINIC | Age: 85
End: 2025-11-21
Payer: MEDICARE

## 2025-12-15 ENCOUNTER — APPOINTMENT (OUTPATIENT)
Dept: OTOLARYNGOLOGY | Facility: CLINIC | Age: 85
End: 2025-12-15
Payer: MEDICARE

## 2026-05-14 ENCOUNTER — APPOINTMENT (OUTPATIENT)
Dept: PRIMARY CARE | Facility: CLINIC | Age: 86
End: 2026-05-14
Payer: MEDICARE

## (undated) DEVICE — YANKAUER,BULB TIP,W/O VENT,RIGID,STERILE: Brand: MEDLINE

## (undated) DEVICE — SYRINGE MED 10ML LUERLOCK TIP W/O SFTY DISP

## (undated) DEVICE — PACK,EENT,TURBAN DRAPE,PK II: Brand: MEDLINE

## (undated) DEVICE — PATIENT TRACKER 9734887XOM NON-INVASIVE

## (undated) DEVICE — SUTURE PROL SZ 2-0 L30IN NONABSORBABLE BLU L60MM KS STR REV 8623H

## (undated) DEVICE — GAUZE,SPONGE,2"X2",8PLY,STERILE,LF,2'S: Brand: MEDLINE

## (undated) DEVICE — SUTURE CHROMIC GUT SZ 3-0 L27IN ABSRB BRN L26MM SH 1/2 CIR G122H

## (undated) DEVICE — SYRINGE IRRIG 60ML SFT PLIABLE BLB EZ TO GRP 1 HND USE W/

## (undated) DEVICE — GLOVE ORANGE PI 7 1/2   MSG9075

## (undated) DEVICE — TUBING SET, UNIDRIVE/UNIDRIVE SIII: Brand: N.A.

## (undated) DEVICE — NEEDLE HYPO 25GA L1.5IN BLU POLYPR HUB S STL REG BVL STR

## (undated) DEVICE — SPONGE,NEURO,1"X3",XR,STRL,LF,10/PK: Brand: MEDLINE

## (undated) DEVICE — 3M™ STERI-DRAPE™ INSTRUMENT POUCH 1018: Brand: STERI-DRAPE™

## (undated) DEVICE — BLADE,CARBON-STEEL,15,STRL,DISPOSABLE,TB: Brand: MEDLINE

## (undated) DEVICE — SHAVER BLADE, Ø 4 MM, 65°, 5X STERILE: Brand: N.A.

## (undated) DEVICE — TUBING, SUCTION, 9/32" X 12', STRAIGHT: Brand: MEDLINE INDUSTRIES, INC.

## (undated) DEVICE — Device: Brand: NAKAO QUICKTRAP

## (undated) DEVICE — SPLINT 1522000 20PK PAIR SIMPLESPLINTS

## (undated) DEVICE — COVER LT HNDL BLU PLAS

## (undated) DEVICE — LABEL MED MINI W/ MARKER

## (undated) DEVICE — COUNTER NDL 40 COUNT HLD 70 FOAM BLK ADH W/ MAG

## (undated) DEVICE — INSTRUMENT TRACKER 9733533XOM ENT 1PK

## (undated) DEVICE — GAUZE,SPONGE,4"X4",16PLY,XRAY,STRL,LF: Brand: MEDLINE

## (undated) DEVICE — 4-PORT MANIFOLD: Brand: NEPTUNE 2

## (undated) DEVICE — KIT,ANTI FOG,W/SPONGE & FLUID,SOFT PACK: Brand: MEDLINE

## (undated) DEVICE — TOWEL,OR,DSP,ST,BLUE,STD,4/PK,20PK/CS: Brand: MEDLINE

## (undated) DEVICE — PAD,NON-ADHERENT,3X8,STERILE,LF,1/PK: Brand: MEDLINE